# Patient Record
Sex: FEMALE | Race: WHITE | NOT HISPANIC OR LATINO | Employment: OTHER | URBAN - METROPOLITAN AREA
[De-identification: names, ages, dates, MRNs, and addresses within clinical notes are randomized per-mention and may not be internally consistent; named-entity substitution may affect disease eponyms.]

---

## 2020-08-01 ENCOUNTER — COMMUNICATION - HEALTHEAST (OUTPATIENT)
Dept: SCHEDULING | Facility: CLINIC | Age: 80
End: 2020-08-01

## 2020-10-16 ENCOUNTER — COMMUNICATION - HEALTHEAST (OUTPATIENT)
Dept: SCHEDULING | Facility: CLINIC | Age: 80
End: 2020-10-16

## 2021-05-29 ENCOUNTER — RECORDS - HEALTHEAST (OUTPATIENT)
Dept: ADMINISTRATIVE | Facility: CLINIC | Age: 81
End: 2021-05-29

## 2021-09-15 ENCOUNTER — TRANSFERRED RECORDS (OUTPATIENT)
Dept: HEALTH INFORMATION MANAGEMENT | Facility: CLINIC | Age: 81
End: 2021-09-15

## 2021-09-15 ENCOUNTER — HOSPITAL ENCOUNTER (INPATIENT)
Facility: CLINIC | Age: 81
LOS: 1 days | Discharge: HOME | DRG: 392 | End: 2021-09-16
Attending: INTERNAL MEDICINE | Admitting: FAMILY MEDICINE
Payer: COMMERCIAL

## 2021-09-15 DIAGNOSIS — K57.92 DIVERTICULITIS: ICD-10-CM

## 2021-09-15 DIAGNOSIS — K52.9 COLITIS: Primary | ICD-10-CM

## 2021-09-15 DIAGNOSIS — J01.91 ACUTE RECURRENT SINUSITIS, UNSPECIFIED LOCATION: ICD-10-CM

## 2021-09-15 LAB
ALT SERPL-CCNC: 11 IU/L (ref 8–45)
AST SERPL-CCNC: 24 IU/L (ref 2–40)
CEA SERPL-MCNC: 0.8 NG/ML (ref 0–3)
CREATININE (EXTERNAL): 1.04 MG/DL (ref 0.57–1.11)
GFR ESTIMATED (EXTERNAL): 51 ML/MIN/1.73M2
GFR ESTIMATED (IF AFRICAN AMERICAN) (EXTERNAL): >60 ML/MIN/1.73M2
GLUCOSE (EXTERNAL): 123 MG/DL (ref 65–100)
POTASSIUM (EXTERNAL): 4.3 MMOL/L (ref 3.5–5)

## 2021-09-15 PROCEDURE — U0005 INFEC AGEN DETEC AMPLI PROBE: HCPCS | Performed by: FAMILY MEDICINE

## 2021-09-15 PROCEDURE — C9113 INJ PANTOPRAZOLE SODIUM, VIA: HCPCS | Performed by: FAMILY MEDICINE

## 2021-09-15 PROCEDURE — 36415 COLL VENOUS BLD VENIPUNCTURE: CPT | Performed by: INTERNAL MEDICINE

## 2021-09-15 PROCEDURE — 87040 BLOOD CULTURE FOR BACTERIA: CPT | Performed by: INTERNAL MEDICINE

## 2021-09-15 PROCEDURE — 120N000001 HC R&B MED SURG/OB

## 2021-09-15 PROCEDURE — 82378 CARCINOEMBRYONIC ANTIGEN: CPT | Performed by: PHYSICIAN ASSISTANT

## 2021-09-15 PROCEDURE — 87506 IADNA-DNA/RNA PROBE TQ 6-11: CPT | Performed by: INTERNAL MEDICINE

## 2021-09-15 PROCEDURE — 258N000003 HC RX IP 258 OP 636: Performed by: FAMILY MEDICINE

## 2021-09-15 PROCEDURE — 36415 COLL VENOUS BLD VENIPUNCTURE: CPT | Performed by: PHYSICIAN ASSISTANT

## 2021-09-15 PROCEDURE — 87493 C DIFF AMPLIFIED PROBE: CPT | Performed by: INTERNAL MEDICINE

## 2021-09-15 PROCEDURE — 99207 PR NO CHARGE LOS: CPT | Performed by: PHYSICIAN ASSISTANT

## 2021-09-15 PROCEDURE — 99222 1ST HOSP IP/OBS MODERATE 55: CPT | Performed by: SURGERY

## 2021-09-15 PROCEDURE — 99223 1ST HOSP IP/OBS HIGH 75: CPT | Mod: AI | Performed by: FAMILY MEDICINE

## 2021-09-15 PROCEDURE — 250N000013 HC RX MED GY IP 250 OP 250 PS 637: Performed by: FAMILY MEDICINE

## 2021-09-15 PROCEDURE — 250N000011 HC RX IP 250 OP 636: Performed by: FAMILY MEDICINE

## 2021-09-15 RX ORDER — SODIUM CHLORIDE 9 MG/ML
INJECTION, SOLUTION INTRAVENOUS CONTINUOUS
Status: DISCONTINUED | OUTPATIENT
Start: 2021-09-15 | End: 2021-09-16 | Stop reason: HOSPADM

## 2021-09-15 RX ORDER — GABAPENTIN 100 MG/1
100 CAPSULE ORAL
Status: DISCONTINUED | OUTPATIENT
Start: 2021-09-15 | End: 2021-09-16 | Stop reason: HOSPADM

## 2021-09-15 RX ORDER — PIPERACILLIN SODIUM, TAZOBACTAM SODIUM 3; .375 G/15ML; G/15ML
3.38 INJECTION, POWDER, LYOPHILIZED, FOR SOLUTION INTRAVENOUS EVERY 8 HOURS
Status: DISCONTINUED | OUTPATIENT
Start: 2021-09-15 | End: 2021-09-15

## 2021-09-15 RX ORDER — PIPERACILLIN SODIUM, TAZOBACTAM SODIUM 3; .375 G/15ML; G/15ML
3.38 INJECTION, POWDER, LYOPHILIZED, FOR SOLUTION INTRAVENOUS ONCE
Status: COMPLETED | OUTPATIENT
Start: 2021-09-15 | End: 2021-09-15

## 2021-09-15 RX ORDER — POLYETHYLENE GLYCOL 3350 17 G/17G
17 POWDER, FOR SOLUTION ORAL AT BEDTIME
COMMUNITY

## 2021-09-15 RX ORDER — FLUTICASONE PROPIONATE 50 MCG
1 SPRAY, SUSPENSION (ML) NASAL DAILY
Status: DISCONTINUED | OUTPATIENT
Start: 2021-09-15 | End: 2021-09-16 | Stop reason: HOSPADM

## 2021-09-15 RX ORDER — CARBOXYMETHYLCELLULOSE SODIUM 10 MG/ML
1 GEL OPHTHALMIC 3 TIMES DAILY PRN
Status: DISCONTINUED | OUTPATIENT
Start: 2021-09-15 | End: 2021-09-16 | Stop reason: HOSPADM

## 2021-09-15 RX ORDER — ACETAMINOPHEN 325 MG/1
650 TABLET ORAL EVERY 6 HOURS PRN
Status: DISCONTINUED | OUTPATIENT
Start: 2021-09-15 | End: 2021-09-16 | Stop reason: HOSPADM

## 2021-09-15 RX ORDER — CARBOXYMETHYLCELLULOSE SODIUM 5 MG/ML
1 SOLUTION/ DROPS OPHTHALMIC 3 TIMES DAILY PRN
COMMUNITY

## 2021-09-15 RX ORDER — GABAPENTIN 100 MG/1
100 CAPSULE ORAL AT BEDTIME
Status: DISCONTINUED | OUTPATIENT
Start: 2021-09-15 | End: 2021-09-16 | Stop reason: HOSPADM

## 2021-09-15 RX ORDER — LIDOCAINE 40 MG/G
CREAM TOPICAL
Status: DISCONTINUED | OUTPATIENT
Start: 2021-09-15 | End: 2021-09-16 | Stop reason: HOSPADM

## 2021-09-15 RX ORDER — ONDANSETRON 2 MG/ML
4 INJECTION INTRAMUSCULAR; INTRAVENOUS EVERY 6 HOURS PRN
Status: DISCONTINUED | OUTPATIENT
Start: 2021-09-15 | End: 2021-09-16 | Stop reason: HOSPADM

## 2021-09-15 RX ORDER — GABAPENTIN 100 MG/1
100 CAPSULE ORAL
COMMUNITY
End: 2021-09-21

## 2021-09-15 RX ORDER — ACETAMINOPHEN 650 MG/1
650 SUPPOSITORY RECTAL EVERY 6 HOURS PRN
Status: DISCONTINUED | OUTPATIENT
Start: 2021-09-15 | End: 2021-09-16 | Stop reason: HOSPADM

## 2021-09-15 RX ORDER — PIPERACILLIN SODIUM, TAZOBACTAM SODIUM 3; .375 G/15ML; G/15ML
3.38 INJECTION, POWDER, LYOPHILIZED, FOR SOLUTION INTRAVENOUS EVERY 8 HOURS
Status: DISCONTINUED | OUTPATIENT
Start: 2021-09-15 | End: 2021-09-16 | Stop reason: HOSPADM

## 2021-09-15 RX ORDER — CEFTRIAXONE 1 G/1
1 INJECTION, POWDER, FOR SOLUTION INTRAMUSCULAR; INTRAVENOUS EVERY 24 HOURS
Status: DISCONTINUED | OUTPATIENT
Start: 2021-09-16 | End: 2021-09-15

## 2021-09-15 RX ORDER — ONDANSETRON 4 MG/1
4 TABLET, ORALLY DISINTEGRATING ORAL EVERY 6 HOURS PRN
Status: DISCONTINUED | OUTPATIENT
Start: 2021-09-15 | End: 2021-09-16 | Stop reason: HOSPADM

## 2021-09-15 RX ORDER — ALBUTEROL SULFATE 90 UG/1
1-2 AEROSOL, METERED RESPIRATORY (INHALATION) EVERY 4 HOURS PRN
Status: DISCONTINUED | OUTPATIENT
Start: 2021-09-15 | End: 2021-09-16 | Stop reason: HOSPADM

## 2021-09-15 RX ADMIN — PANTOPRAZOLE SODIUM 40 MG: 40 INJECTION, POWDER, FOR SOLUTION INTRAVENOUS at 15:31

## 2021-09-15 RX ADMIN — SODIUM CHLORIDE: 9 INJECTION, SOLUTION INTRAVENOUS at 22:02

## 2021-09-15 RX ADMIN — METRONIDAZOLE 500 MG: 500 INJECTION, SOLUTION INTRAVENOUS at 13:38

## 2021-09-15 RX ADMIN — PIPERACILLIN AND TAZOBACTAM 3.38 G: 3; .375 INJECTION, POWDER, LYOPHILIZED, FOR SOLUTION INTRAVENOUS at 23:48

## 2021-09-15 RX ADMIN — ONDANSETRON 4 MG: 2 INJECTION INTRAMUSCULAR; INTRAVENOUS at 13:37

## 2021-09-15 RX ADMIN — SODIUM CHLORIDE: 9 INJECTION, SOLUTION INTRAVENOUS at 11:53

## 2021-09-15 RX ADMIN — GABAPENTIN 100 MG: 100 CAPSULE ORAL at 20:22

## 2021-09-15 RX ADMIN — PIPERACILLIN AND TAZOBACTAM 3.38 G: 3; .375 INJECTION, POWDER, LYOPHILIZED, FOR SOLUTION INTRAVENOUS at 15:31

## 2021-09-15 ASSESSMENT — ACTIVITIES OF DAILY LIVING (ADL)
TOILETING_ISSUES: NO
DIFFICULTY_COMMUNICATING: NO
FALL_HISTORY_WITHIN_LAST_SIX_MONTHS: NO
EQUIPMENT_CURRENTLY_USED_AT_HOME: CANE, STRAIGHT;WALKER, STANDARD
DRESSING/BATHING_DIFFICULTY: NO
WHICH_OF_THE_ABOVE_FUNCTIONAL_RISKS_HAD_A_RECENT_ONSET_OR_CHANGE?: AMBULATION
DOING_ERRANDS_INDEPENDENTLY_DIFFICULTY: NO
WEAR_GLASSES_OR_BLIND: YES
VISION_MANAGEMENT: GLASSSES
CONCENTRATING,_REMEMBERING_OR_MAKING_DECISIONS_DIFFICULTY: NO
WALKING_OR_CLIMBING_STAIRS: AMBULATION DIFFICULTY, REQUIRES EQUIPMENT
DIFFICULTY_EATING/SWALLOWING: NO
WALKING_OR_CLIMBING_STAIRS_DIFFICULTY: YES
PATIENT_/_FAMILY_COMMUNICATION_STYLE: SPOKEN LANGUAGE (ENGLISH OR BILINGUAL)
HEARING_DIFFICULTY_OR_DEAF: NO

## 2021-09-15 ASSESSMENT — MIFFLIN-ST. JEOR: SCORE: 1265.56

## 2021-09-15 NOTE — H&P
"[facility] MEDICINE ADMISSION HISTORY AND PHYSICAL   Date of Admission: 9/15/2021  Blessing Castellanos, 1940, [id number]    Identification/Summary:   Blessing Castellanos is a 81 year old female with PMH signficant for COPD O2 with activity, RLS, spinal stenosis and Raynaud's.  July 2020 hospitalized for perforated diverticulitis with SBO.  9/15/2021 had similar abdominal pain.  Seen at [facility] and CT showed partial SBO with thickened adjacent segment concerning for possible fistula.  Directly admitted to [facility].  Receiving IV ceftriaxone and Flagyl.  Surgery and GI consulted.      Assessment and Plan:  -Small bowel obstruction  -Colonic inflammatory changes  9/15 [facility] CT scan showed \"moderate segment of wall thickening and hyperenhancement of the sigmoid colon which extends to and involves an adjacent segment of small bowel with findings suggesting partial small bowel obstruction. The appearance is atypical for acute diverticulitis and this may be due to chronic inflammatory bowel disease such as ulcerative colitis with developing fistulous disease with the adjacent small bowel.\"  Given ceftriaxone and Flagyl in the ED.  Directly transferred to [facility].  Keep NPO.  IV fluids ordered.  Ordered consultations with GI and general surgery.  Order IV ceftriaxone and Flagyl.  -COPD  At home patient uses 2 L nasal cannula oxygen with activity.  Order home Dulera, Anoro Ellipta and albuterol as needed.  -Acute on chronic sinusitis  Patient notes right nare green drainage and increased tenderness.  Order home Flonase daily.  Order additional antibiotics per pharmacy recommendations.  -GERD  Transition Prevacid to IV Protonix.  -Restless leg syndrome  -Raynaud's  Order patient's bedtime gabapentin 100 mg nightly and may repeat x1 as needed.  -COVID19 PCR status unknown  Covid swab was not obtained at [facility].  Ordered Covid swab now.  Standard precautions at this time.    Patient " "reports she has completed vaccination series.  -Anticoagulation   Pneumatic Compression Devices and moderate risk.  Uncertain if procedures would be indicated.    Addendum 1:51 PM    Discussed with pharmacy and in an effort to treat potential diverticulitis and sinusitis simultaneously, will discontinue ceftriaxone and Flagyl and switch to IV Zosyn.    Mauro Tang MD  9/15/2021  1:51 PM    -FoleyNot present    Code Status: No CPR- Do NOT Intubate-discussed and verified with patient    Disposition: Inpatient     Chief Complaint  abdominal pain and bloating     HISTORY     Blessing Castellanos is a 81 year old female with PMH of COPD, 2 L of oxygen with activity, SBO hospitalization July 2020, RLS, Raynaud's, GERD, spinal stenosis.  9/15 presented to [facility] with complaints of abdominal pain and bloating.  Felt similar to previous episode of SBO.  CT scan showed \"moderate segment of wall thickening and hyperenhancement of the sigmoid colon which extends to and involves an adjacent segment of small bowel with findings suggesting partial small bowel obstruction. The appearance is atypical for acute diverticulitis and this may be due to chronic inflammatory bowel disease such as ulcerative colitis with developing fistulous disease with the adjacent small bowel.\"  Started on intravenous ceftriaxone and Flagyl and direct admission requested to [facility].  Patient feeling better after arrival.  No chest pain.  Breathing feels normal.  Not requiring supplemental oxygen.  Had not been swab for Covid.  States that she has been vaccinated.  Denies personal history of heart attack, stroke, cancer, diabetes, DVT, PE, peptic ulcer disease.  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  Family is aware that she is here.  Questions answered to verbalize satisfaction.    Past Medical History     Past Medical History:  No date: Bell's palsy  No date: COPD (chronic obstructive pulmonary disease) " (H)  No date: GERD (gastroesophageal reflux disease)  No date: Hypercholesteremia  No date: Nephrolithiasis  No date: Osteoarthritis  No date: PONV (postoperative nausea and vomiting)  No date: Raynaud's disease  No date: Restless leg  No date: Spinal stenosis     Patient Active Problem List    Diagnosis Date Noted     Colitis 09/15/2021     Priority: Medium     SBO (small bowel obstruction) (H) 2020     Priority: Medium     Status post left hip replacement 2015     Priority: Medium     PONV (postoperative nausea and vomiting)      Priority: Medium     Raynaud's disease      Priority: Medium     COPD (chronic obstructive pulmonary disease) (H)      Priority: Medium     Restless leg      Priority: Medium     GERD (gastroesophageal reflux disease)      Priority: Medium     Surgical History     Past Surgical History:   Procedure Laterality Date     APPENDECTOMY       BACK SURGERY       CATARACT EXTRACTION       CHOLECYSTECTOMY       CYSTOCELE REPAIR       HYSTERECTOMY       JOINT REPLACEMENT Bilateral     shoulder     OTHER SURGICAL HISTORY      kidney stones     TONSILLECTOMY       TOTAL HIP ARTHROPLASTY Left 2015    Procedure: LEFT HIP TOTAL ARTHROPLASTY;  Surgeon: Chay Moran MD;  Location: [facility];  Service:      TUBAL LIGATION       VEIN LIGATION AND STRIPPING       Family History      Family History   Problem Relation Age of Onset     Heart Disease Maternal Grandfather       Social History      Social History     Tobacco Use     Smoking status: Former Smoker     Quit date: 1991     Years since quittin.3   Substance Use Topics     Alcohol use: No     Comment: Alcoholic Drinks/day: rare     Drug use: No      Allergies     Allergies   Allergen Reactions     Doxycycline Calcium [Doxycycline] Hives     Mirapex [Pramipexole] Hives     Prilosec [Omeprazole] Hives     Sulfa (Sulfonamide Antibiotics) [Sulfa Drugs] Other (See Comments)     GI Upset     Zithromax [Azithromycin] Unknown      Listed on H&P     Antihistamines - Alkylamine [Alkylamines] Anxiety     Prior to Admission Medications      Prior to Admission Medications   Prescriptions Last Dose Informant Patient Reported? Taking?   acetaminophen (TYLENOL) 500 MG tablet   No No   Sig: [ACETAMINOPHEN (TYLENOL) 500 MG TABLET] Take 1-2 tablets (500-1,000 mg total) by mouth every 4 (four) hours as needed.   albuterol (PROVENTIL HFA;VENTOLIN HFA) 90 mcg/actuation inhaler   Yes No   Sig: [ALBUTEROL (PROVENTIL HFA;VENTOLIN HFA) 90 MCG/ACTUATION INHALER] Inhale 1-2 puffs every 4 (four) hours as needed for wheezing or shortness of breath.   cholecalciferol, vitamin D3, 2,000 unit Tab   Yes No   Sig: [CHOLECALCIFEROL, VITAMIN D3, 2,000 UNIT TAB] Take 2,000 Units by mouth once daily.    fluticasone (FLONASE) 50 mcg/actuation nasal spray   Yes No   Sig: [FLUTICASONE (FLONASE) 50 MCG/ACTUATION NASAL SPRAY] 1 spray into each nostril daily as needed for rhinitis.   gabapentin (NEURONTIN) 100 MG capsule   Yes No   Sig: [GABAPENTIN (NEURONTIN) 100 MG CAPSULE] Take 100 mg by mouth at bedtime.    lansoprazole (PREVACID) 30 MG capsule   Yes No   Sig: [LANSOPRAZOLE (PREVACID) 30 MG CAPSULE] Take 30 mg by mouth daily.   magnesium hydroxide (MAGNESIUM HYDROXIDE) 400 mg/5 mL Susp suspension   Yes No   Sig: [MAGNESIUM HYDROXIDE (MAGNESIUM HYDROXIDE) 400 MG/5 ML SUSP SUSPENSION] Take 30 mL by mouth at bedtime.   mometasone-formoterol (DULERA) 200-5 mcg/actuation HFAA inhaler   Yes No   Sig: [MOMETASONE-FORMOTEROL (DULERA) 200-5 MCG/ACTUATION HFAA INHALER] Inhale 2 puffs once daily.   umeclidinium-vilanteroL (ANORO ELLIPTA) 62.5-25 mcg/actuation inhaler   Yes No   Sig: [UMECLIDINIUM-VILANTEROL (ANORO ELLIPTA) 62.5-25 MCG/ACTUATION INHALER] Inhale 1 puff daily.      Facility-Administered Medications: None      Review of Systems     A 12 point comprehensive review of systems was negative except as noted above in HPI.    PHYSICAL EXAMINATION     Vitals      Temp:  [97.9   F (36.6  C)] 97.9  F (36.6  C)  Pulse:  [87] 87  Resp:  [18] 18  BP: (129)/(63) 129/63  SpO2:  [92 %] 92 %    Examination     Constitutional: awake, alert, cooperative, no apparent distress, and appears stated age  Eyes: Lids and lashes normal, pupils equal, round and reactive to light, extra ocular muscles intact, sclera clear, conjunctiva normal  ENT: Normocephalic, without obvious abnormality, atraumatic, sinuses nontender on palpation, external ears without lesions, oral pharynx with moist mucous membranes, tonsils without erythema or exudates, gums normal and good dentition.  Hematologic / Lymphatic: no cervical lymphadenopathy and no supraclavicular lymphadenopathy  Respiratory: No increased work of breathing, good air exchange, clear to auscultation bilaterally, no crackles or wheezing  Cardiovascular: Normal apical impulse, regular rate and rhythm, normal S1 and S2, no S3 or S4, and no murmur noted  GI: Diminished bowel sounds soft nondistended.  Mild right lower quadrant tenderness to palpation.  Skin: normal skin color, texture, turgor, no redness, warmth, or swelling, and no rashes  Musculoskeletal: There is no redness, warmth, or swelling of the joints.  Full range of motion noted.  Motor strength is 5 out of 5 all extremities bilaterally.  Tone is normal. no lower extremity pitting edema present  Neurologic: Cranial nerves II-XII are grossly intact. Sensory:  Sensory intact Deep Tendon Reflexes:  Reflexes are intact and symmetrical bilaterally  Neuropsychiatric: General: normal, calm and normal eye contact Level of consciousness: alert / normal Affect: normal Orientation: oriented to self, place, time and situation Memory and insight: normal, memory for past and recent events intact and thought process normal      Pertinent Lab   No results found for this or any previous visit (from the past 24 hour(s)).  UA W/ SEDIMENT EXAM REFLEXED PER CRITERIA (09/15/2021 7:43 AM CDT)  UA W/ SEDIMENT EXAM REFLEXED  PER CRITERIA (09/15/2021 7:43 AM CDT)   Component Value Ref Range Performed At Pathologist Signature   COLOR  Yellow Yellow Color [facility]     CLARITY  Clear Clear Clarity [facility]     SPECIFIC GRAVITY,URINE  1.010 1.010, 1.015, 1.020, 1.025  [facility]     PH,URINE  6.5 6.0, 7.0, 8.0, 5.5, 6.5, 7.5, 8.5  [facility]     UROBILINOGEN,QUALITATIVE  Normal Normal EU/dl [facility]     PROTEIN, URINE Negative Negative mg/dL [facility]     GLUCOSE, URINE Negative Negative mg/dL [facility]     KETONES,URINE  Negative Negative mg/dL [facility]     BILIRUBIN,URINE  Negative Negative  [facility]     OCCULT BLOOD,URINE  Negative Negative  [facility]     NITRITE  Negative Negative  [facility]     LEUKOCYTE ESTERASE  Negative Negative  [facility]       UA W/ SEDIMENT EXAM REFLEXED PER CRITERIA (09/15/2021 7:43 AM CDT)   Specimen   Urine - Urine specimen (specimen)     UA W/ SEDIMENT EXAM REFLEXED PER CRITERIA (09/15/2021 7:43 AM CDT)   Performing Organization Address City/State/[place]IP [place] [address]   [phone]     Back to top of Results       CT ABD/PELVIS W IV CONTRAST (09/15/2021 7:26 AM CDT)  CT ABD/PELVIS W IV CONTRAST (09/15/2021 7:26 AM CDT)   Specimen         CT ABD/PELVIS W IV CONTRAST (09/15/2021 7:26 AM CDT)   Matteo   FortMorales MD - 09/15/2021 8:46 AM CDT    This result has an attachment that is not available.    For Patients: As a result of the 21st Century Cures Act, medical imaging exams and procedure reports are released immediately into your electronic medical record. You may view this report before your referring provider. If you have questions, please contact your health care provider.    EXAM: CT ABDOMEN PELVIS W  LOCATION: [facility]  DATE/TIME: 9/15/2021 7:26 AM    INDICATION: Left lower quadrant abdominal pain.  COMPARISON: 9/14/2020.  TECHNIQUE: CT scan of the abdomen  and pelvis was performed following injection of IV contrast. Repeat imaging was obtained due to moderate motion artifact on the initial acquisition. Multiplanar reformats were obtained. Dose reduction techniques were used.  CONTRAST: IOHEXOL 350 MG IODINE/ML  ML BOTTLE: 75mL    FINDINGS:   LOWER CHEST: Negative aside from mild atelectasis or scarring and small hiatal hernia.    HEPATOBILIARY: Normal liver. Prior cholecystectomy.    PANCREAS: Normal.    SPLEEN: Normal.    ADRENAL GLANDS: Normal.    KIDNEYS/BLADDER: No significant mass, stones, or hydronephrosis. There are simple or benign cysts. No follow up is needed. Bladder not well distended.    BOWEL: There is a moderate segment of the sigmoid colon which demonstrates persistent hyperenhancement and with wall thickening and mild adjacent stranding in a similar location to previous imaging. No single inflamed diverticulum is seen as also abnormal enhancement extending from the sigmoid colon which involves the short segment of small bowel in the pelvis posteriorly without abrupt caliber change at this level which is suggestive of partial small bowel obstruction on series 6, image and 105. There are multiple loops of fluid distended small bowel throughout the abdomen with several air-fluid levels measuring 2.9 cm in maximal diameter. The hyperenhancing thickened wall of the sigmoid colon is confluent with mild thickening the left pelvic sidewall. Moderate fluid in the stomach as well as a probable very small hiatal hernia containing mild fluid. There is no free air, loculated fluid collection, or significant ascites.    LYMPH NODES: Normal.    VASCULATURE: Mild atherosclerotic changes without aneurysmal dilatation.    PELVIC ORGANS: No pelvic mass. Inflammatory changes and wall thickening as described above.    MUSCULOSKELETAL: Left hip arthroplasty and long segment posterior metallic fusion of the lumbar spine with interbody fusion with scoliosis and  degenerative changes of the spine, sacroiliac joints, and mild degenerative changes of the right hip.    IMPRESSION:   1.  Abnormal exam with moderate segment of wall thickening and hyperenhancement of the sigmoid colon which extends to and involves an adjacent segment of small bowel with findings suggesting partial small bowel obstruction. The appearance is atypical for acute diverticulitis and this may be due to chronic inflammatory bowel disease such as ulcerative colitis with developing fistulous disease with the adjacent small bowel. Chronic diverticulitis could have a similar appearance although long segment of sigmoid colonic involvement would be atypical and colonoscopy will likely be needed for tissue biopsy and confirm this is benign wall thickening. Findings communicated to the emergency room physician by telephone 9/15/2021 at 8:30 AM.    2.  No abscess, free air, or additional complication.    NOTE: ABNORMAL REPORT    THE DICTATION ABOVE DESCRIBES AN ABNORMALITY FOR WHICH FOLLOW-UP IS NEEDED.        CT ABD/PELVIS W IV CONTRAST (09/15/2021 7:26 AM CDT)   Procedure Note   Morales Moss MD - 09/15/2021    Formatting of this note might be different from the original.  For Patients: As a result of the 21st Century Cures Act, medical imaging exams and procedure reports are released immediately into your electronic medical record. You may view this report before your referring provider. If you have questions, please contact your health care provider.    EXAM: CT ABDOMEN PELVIS W  LOCATION: [facility]  DATE/TIME: 9/15/2021 7:26 AM    INDICATION: Left lower quadrant abdominal pain.  COMPARISON: 9/14/2020.  TECHNIQUE: CT scan of the abdomen and pelvis was performed following injection of IV contrast. Repeat imaging was obtained due to moderate motion artifact on the initial acquisition. Multiplanar reformats were obtained. Dose reduction techniques were used.  CONTRAST: IOHEXOL 350 MG IODINE/ML  ML  BOTTLE: 75mL    FINDINGS:   LOWER CHEST: Negative aside from mild atelectasis or scarring and small hiatal hernia.    HEPATOBILIARY: Normal liver. Prior cholecystectomy.    PANCREAS: Normal.    SPLEEN: Normal.    ADRENAL GLANDS: Normal.    KIDNEYS/BLADDER: No significant mass, stones, or hydronephrosis. There are simple or benign cysts. No follow up is needed. Bladder not well distended.    BOWEL: There is a moderate segment of the sigmoid colon which demonstrates persistent hyperenhancement and with wall thickening and mild adjacent stranding in a similar location to previous imaging. No single inflamed diverticulum is seen as also abnormal enhancement extending from the sigmoid colon which involves the short segment of small bowel in the pelvis posteriorly without abrupt caliber change at this level which is suggestive of partial small bowel obstruction on series 6, image and 105. There are multiple loops of fluid distended small bowel throughout the abdomen with several air-fluid levels measuring 2.9 cm in maximal diameter. The hyperenhancing thickened wall of the sigmoid colon is confluent with mild thickening the left pelvic sidewall. Moderate fluid in the stomach as well as a probable very small hiatal hernia containing mild fluid. There is no free air, loculated fluid collection, or significant ascites.    LYMPH NODES: Normal.    VASCULATURE: Mild atherosclerotic changes without aneurysmal dilatation.    PELVIC ORGANS: No pelvic mass. Inflammatory changes and wall thickening as described above.    MUSCULOSKELETAL: Left hip arthroplasty and long segment posterior metallic fusion of the lumbar spine with interbody fusion with scoliosis and degenerative changes of the spine, sacroiliac joints, and mild degenerative changes of the right hip.    IMPRESSION:   1. Abnormal exam with moderate segment of wall thickening and hyperenhancement of the sigmoid colon which extends to and involves an adjacent segment of  small bowel with findings suggesting partial small bowel obstruction. The appearance is atypical for acute diverticulitis and this may be due to chronic inflammatory bowel disease such as ulcerative colitis with developing fistulous disease with the adjacent small bowel. Chronic diverticulitis could have a similar appearance although long segment of sigmoid colonic involvement would be atypical and colonoscopy will likely be needed for tissue biopsy and confirm this is benign wall thickening. Findings communicated to the emergency room physician by telephone 9/15/2021 at 8:30 AM.    2. No abscess, free air, or additional complication.    NOTE: ABNORMAL REPORT    THE DICTATION ABOVE DESCRIBES AN ABNORMALITY FOR WHICH FOLLOW-UP IS NEEDED.      Back to top of Results       CBC WITH AUTO DIFFERENTIAL (09/15/2021 6:47 AM CDT)  CBC WITH AUTO DIFFERENTIAL (09/15/2021 6:47 AM CDT)   Component Value Ref Range Performed At Pathologist Signature   WHITE BLOOD COUNT  18.9 (H) 4.5 - 11.0 thou/cu mm [facility]     RED BLOOD COUNT  5.50 (H) 4.00 - 5.20 mil/cu mm [facility]     HEMOGLOBIN  16.1 (H) 12.0 - 16.0 g/dL [facility]     HEMATOCRIT  49.3 33.0 - 51.0 % [facility]     MCV  90 80 - 100 fL [facility]     MCH  29.3 26.0 - 34.0 pg [facility]     MCHC  32.7 32.0 - 36.0 g/dL [facility]     RDW  13.9 11.5 - 15.5 % [facility]     PLATELET COUNT  421 140 - 440 thou/cu mm [facility]     MPV  9.3 6.5 - 11.0 fL [facility]     NEUTROPHILS  86.7 % [facility]     LYMPHOCYTES  6.8 % [facility]     MONOCYTES  5.6 % [facility]     EOSINOPHILS  0.3 % [facility]     BASOPHILS  0.2 % [facility]     IMMATURE GRANULOCYTES(METAS,MYELOS,PROS) 0.4 % [facility]     ABSOLUTE NEUTROPHILS  16.4 (H) 1.7 - 7.0 thou/cu mm [facility]     ABSOLUTE LYMPHOCYTES  1.3 0.9 - 2.9 thou/cu mm [facility]     ABSOLUTE MONOCYTES  1.1 (H) <0.9 thou/cu mm [facility]     ABSOLUTE  EOSINOPHILS  0.1 <0.5 thou/cu [facility]     ABSOLUTE BASOPHILS  0.0 <0.3 thou/cu [facility]     ABSOLUTE IMMATURE GRANULOCYTES(METAS,MYELOS,PROS) 0.1 <0.3 thou/cu [facility]       CBC WITH AUTO DIFFERENTIAL (09/15/2021 6:47 AM CDT)   Specimen   Blood - Blood specimen (specimen)     CBC WITH AUTO DIFFERENTIAL (09/15/2021 6:47 AM CDT)   Performing Organization Address City/State/ZIP Code Phone Number   [facility]   [address] [id number] [phone]     Back to top of Results       COMP METABOLIC PANEL (09/15/2021 6:47 AM CDT)  COMP METABOLIC PANEL (09/15/2021 6:47 AM CDT)   Component Value Ref Range Performed At Pathologist Signature   SODIUM 138 135 - 145 mmol/L [facility]     POTASSIUM 4.3 3.5 - 5.0 mmol/L [facility]     CHLORIDE 100 98 - 110 mmol/L [facility]     CO2,TOTAL 24 21 - 31 mmol/L [facility]     ANION GAP 14 5 - 18  [facility]     GLUCOSE 123 (H) 65 - 100 mg/dL [facility]     CALCIUM 10.6 (H) 8.5 - 10.5 mg/dL [facility]     BUN 20 8 - 25 mg/dL [facility]     CREATININE 1.04 0.57 - 1.11 mg/dL [facility]     BUN/CREAT RATIO  19 10 - 20  [facility]     GFR if African American >60 >60 ml/min/1.73m2 [facility]     GFR if not African American 51 (L) >60 ml/min/1.73m2 [facility]     ALBUMIN 4.3 3.2 - 4.6 g/dL [facility]     PROTEIN,TOTAL 8.5 (H) 6.0 - 8.0 g/dL [facility]     GLOBULIN  4.2 (H) 2.0 - 3.7 g/dL [facility]     A/G RATIO 1.0 1.0 - 2.0  [facility]     BILIRUBIN,TOTAL 1.0 0.2 - 1.2 mg/dL [facility]     ALK PHOSPHATASE 90 50 - 136 IU/L [facility]     ALT (SGPT) 11 8 - 45 IU/L [facility]     AST (SGOT) 24 2 - 40 IU/L [facility]       COMP METABOLIC PANEL (09/15/2021 6:47 AM CDT)   Specimen   Blood - Blood specimen (specimen)     COMP METABOLIC PANEL (09/15/2021 6:47 AM CDT)   Performing Organization Address City/State/ZIP Code Phone Number   [place]  [place] [id number]   [phone]     Back to top of Results       COVID 19 (06/22/2021 10:33 AM CDT)  COVID 19 (06/22/2021 10:33 AM CDT)   Component Value Ref Range Performed At Pathologist Signature   [place] Negative[place]: All PCR tests are subject to false negative result due to variability in viral load and collection technique. A negative result does not rule out a SARS-CoV-2 infection. Clinical correlation required. Negative [facility]       COVID 19 (06/22/2021 10:33 AM CDT)   Specimen   Other - Specimen from nasopharyngeal structure (specimen)               Advance Care Planning        Mauro Tang MD  [facility]   Phone: #[phone]

## 2021-09-15 NOTE — CONSULTS
GI CONSULT NOTE      Name: Blessing Castellanos  Medical Record #: [id number]  YOB: 1940  Date of Admission: 9/15/2021  Date/Time: 9/15/2021/12:48 PM     CHIEF COMPLAINT: abdomen pain and vomiting     HISTORY OF PRESENT ILLNESS: This is a 81 year old year old White patient seen at the request of Dr. Tang with a history of COPD, RLS, spinal stenosis, Raynaud's, prior cholecystectomy and appendectomy.  She had acute diverticulitis on CT 6/2020, was hospitalized 7/2020 and CT showed colon thickening with possible fistula and SBO.  She was treated with antibiotics and NG tube with improved symptoms.  After discharge she was to have a follow up colonoscopy 10/2020 but this was never done.  The patient said she was busy taking care of her  with cancer, and she deferred colonoscopy as she felt better.  She reports her last colonoscopy was around 8-10 years ago and she did have polyps in the past.    She felt well until about 2 days ago.  She had mild cramps initially, but yesterday the pain worsened and she had several episodes of emesis and diarrhea overnight.  She went to the ER in [place] and was transferred to [facility].  Imaging showed colon thickening and small bowel dilation consistent with pSBO.  Her pain is improved but still with intermittent cramps.  Her nausea improved with antiemetics.    She reports a history of chronic constipation which is typically well controlled with daily MiraLax, usually having a BM once daily.  In the past week she had mild constipation skipping a day or 2 without BMs.  Yesterday she had 2 small BMs before the diarrhea started.  She has not had bloody stools.    There is no family history of GI cancers or IBD.  Her daughter has had diverticulitis.  The patient has lost about 10 pounds recently without trying.  She gets full easily.    PAST MEDICAL HISTORY:  Past Medical History:   Diagnosis Date     Bell's palsy      COPD (chronic obstructive pulmonary  disease) (H)      GERD (gastroesophageal reflux disease)      Hypercholesteremia      Nephrolithiasis      Osteoarthritis      PONV (postoperative nausea and vomiting)      Raynaud's disease      Restless leg      Spinal stenosis        FAMILY HISTORY:  Family History   Problem Relation Age of Onset     Heart Disease Maternal Grandfather        SOCIAL HISTORY:  Social History     Socioeconomic History     Marital status:      Spouse name: Not on file     Number of children: Not on file     Years of education: Not on file     Highest education level: Not on file   Occupational History     Not on file   Tobacco Use     Smoking status: Former Smoker     Quit date: 1991     Years since quittin.3   Substance and Sexual Activity     Alcohol use: No     Comment: Alcoholic Drinks/day: rare     Drug use: No     Sexual activity: Not on file   Other Topics Concern     Not on file   Social History Narrative     Not on file     Social Determinants of Health     Financial Resource Strain:      Difficulty of Paying Living Expenses:    Food Insecurity:      Worried About Running Out of Food in the Last Year:      Ran Out of Food in the Last Year:    Transportation Needs:      Lack of Transportation (Medical):      Lack of Transportation (Non-Medical):    Physical Activity:      Days of Exercise per Week:      Minutes of Exercise per Session:    Stress:      Feeling of Stress :    Social Connections:      Frequency of Communication with Friends and Family:      Frequency of Social Gatherings with Friends and Family:      Attends Yazidi Services:      Active Member of Clubs or Organizations:      Attends Club or Organization Meetings:      Marital Status:    Intimate Partner Violence:      Fear of Current or Ex-Partner:      Emotionally Abused:      Physically Abused:      Sexually Abused:        MEDICATIONS PRIOR TO ADMISSION:   Medications Prior to Admission   Medication Sig Dispense Refill Last Dose      albuterol (PROVENTIL HFA;VENTOLIN HFA) 90 mcg/actuation inhaler [ALBUTEROL (PROVENTIL HFA;VENTOLIN HFA) 90 MCG/ACTUATION INHALER] Inhale 1-2 puffs every 4 (four) hours as needed for wheezing or shortness of breath.   9/14/2021 at can bring     carboxymethylcellulose PF (REFRESH LIQUIGEL) 1 % ophthalmic gel Place 1 drop into both eyes 3 times daily as needed for dry eyes   PRN     cholecalciferol, vitamin D3, 2,000 unit Tab [CHOLECALCIFEROL, VITAMIN D3, 2,000 UNIT TAB] Take 2,000 Units by mouth once daily.    9/14/2021 at Unknown time     fluticasone (FLONASE) 50 mcg/actuation nasal spray [FLUTICASONE (FLONASE) 50 MCG/ACTUATION NASAL SPRAY] 1 spray into each nostril daily as needed for rhinitis.   Past Week at can bring if needed     gabapentin (NEURONTIN) 100 MG capsule Take 100 mg by mouth at bedtime as needed, may repeat once (additional 100 mg dose if needed)   PRN     gabapentin (NEURONTIN) 100 MG capsule [GABAPENTIN (NEURONTIN) 100 MG CAPSULE] Take 100 mg by mouth at bedtime.    9/14/2021 at Unknown time     lansoprazole (PREVACID) 30 MG capsule Take 30 mg by mouth At Bedtime    9/14/2021 at Unknown time     mometasone-formoterol (DULERA) 200-5 mcg/actuation HFAA inhaler [MOMETASONE-FORMOTEROL (DULERA) 200-5 MCG/ACTUATION HFAA INHALER] Inhale 2 puffs once daily.   9/14/2021 at am     polyethylene glycol (MIRALAX) 17 g packet Take 17 g by mouth At Bedtime   9/14/2021 at Unknown time     umeclidinium-vilanteroL (ANORO ELLIPTA) 62.5-25 mcg/actuation inhaler [UMECLIDINIUM-VILANTEROL (ANORO ELLIPTA) 62.5-25 MCG/ACTUATION INHALER] Inhale 1 puff daily.   9/14/2021 at am          ALLERGIES: Doxycycline calcium [doxycycline], Mirapex [pramipexole], Prilosec [omeprazole], Sulfa (sulfonamide antibiotics) [sulfa drugs], Zithromax [azithromycin], and Antihistamines - alkylamine [alkylamines]    REVIEW OF SYSTEMS (ROS): Complete review of systems negative other than listed in HPI.    PHYSICAL EXAM:    /63 (BP Location:  "Left arm)   Pulse 87   Temp 97.9  F (36.6  C) (Oral)   Resp 18   Ht 1.727 m (5' 8\")   Wt 75.2 kg (165 lb 12.8 oz)   SpO2 92%   BMI 25.21 kg/m      GENERAL: Pleasant, no obvious distress    SKIN: No jaundice    NECK: Supple without adenopathy    EYES: No scleral icterus    LUNGS: Clear to auscultation bilaterally    HEART: Regular rate and rhythm, S1 and S2 present, no lower extremity edema    ABDOMEN: Soft, non-distended, mildly tender diffuse right abd upper abdomen, no guarding/rebound/mass, bowel sounds hyperactive, no obvious organomegaly    MUSKULOSKELETAL:  Warm and well perfused, moves all extremities well    NEUROLOGIC: Alert and oriented    PSYCHIATRIC: Normal affect    LAB DATA:  Recent Labs   Lab Test 08/02/20 0522 08/01/20  0559 07/31/20  0551   WBC 14.2* 13.2* 17.9*   HGB 12.3 12.7 14.0   MCV 89 91 90    358 458*     Recent Labs   Lab Test 08/02/20 0522 08/01/20  0559 07/31/20  0551    140 139   POTASSIUM 3.6 3.9 4.8   CHLORIDE 107 109* 104   CO2 24 23 27   BUN 12 16 18   CR 0.68 0.73 1.13*   ANIONGAP 8 8 8   HANG 7.9* 8.1* 8.6   GLC 98 84 110     Recent Labs   Lab Test 07/30/20  1602   ALBUMIN 3.8   BILITOTAL 0.5   ALT 11   AST 19   ALKPHOS 99   LIPASE 10       IMAGING:  EXAM: CT ABDOMEN PELVIS W   LOCATION: [facility]   DATE/TIME: 9/15/2021 7:26 AM     INDICATION: Left lower quadrant abdominal pain.   COMPARISON: 9/14/2020.   TECHNIQUE: CT scan of the abdomen and pelvis was performed following injection of IV contrast. Repeat imaging was obtained due to moderate motion artifact on the initial acquisition. Multiplanar reformats were obtained. Dose reduction techniques were used.   CONTRAST: IOHEXOL 350 MG IODINE/ML  ML BOTTLE: 75mL     FINDINGS:   LOWER CHEST: Negative aside from mild atelectasis or scarring and small hiatal hernia.     HEPATOBILIARY: Normal liver. Prior cholecystectomy.     PANCREAS: Normal.     SPLEEN: Normal.     ADRENAL GLANDS: Normal. "     KIDNEYS/BLADDER: No significant mass, stones, or hydronephrosis. There are simple or benign cysts. No follow up is needed. Bladder not well distended.     BOWEL: There is a moderate segment of the sigmoid colon which demonstrates persistent hyperenhancement and with wall thickening and mild adjacent stranding in a similar location to previous imaging. No single inflamed diverticulum is seen as also abnormal enhancement extending from the sigmoid colon which involves the short segment of small bowel in the pelvis posteriorly without abrupt caliber change at this level which is suggestive of partial small bowel obstruction on series 6, image and 105. There are multiple loops of fluid distended small bowel throughout the abdomen with several air-fluid levels measuring 2.9 cm in maximal diameter. The hyperenhancing thickened wall of the sigmoid colon is confluent with mild thickening the left pelvic sidewall. Moderate fluid in the stomach as well as a probable very small hiatal hernia containing mild fluid. There is no free air, loculated fluid collection, or significant ascites.     LYMPH NODES: Normal.     VASCULATURE: Mild atherosclerotic changes without aneurysmal dilatation.     PELVIC ORGANS: No pelvic mass. Inflammatory changes and wall thickening as described above.     MUSCULOSKELETAL: Left hip arthroplasty and long segment posterior metallic fusion of the lumbar spine with interbody fusion with scoliosis and degenerative changes of the spine, sacroiliac joints, and mild degenerative changes of the right hip.     IMPRESSION:   1.  Abnormal exam with moderate segment of wall thickening and hyperenhancement of the sigmoid colon which extends to and involves an adjacent segment of small bowel with findings suggesting partial small bowel obstruction. The appearance is atypical for acute diverticulitis and this may be due to chronic inflammatory bowel disease such as ulcerative colitis with developing fistulous  disease with the adjacent small bowel. Chronic diverticulitis could have a similar appearance although long segment of sigmoid colonic involvement would be atypical and colonoscopy will likely be needed for tissue biopsy and confirm this is benign wall thickening. Findings communicated to the emergency room physician by telephone 9/15/2021 at 8:30 AM.     2.  No abscess, free air, or additional complication.     9/14/2020 CT abdomen/pelvis with IV contrast [facility]  FINDINGS:   LOWER CHEST: Strands of scarring both lungs. Calcified mitral annulus and aortic   valve leaflets. Calcified right coronary artery atherosclerosis.     HEPATOBILIARY: Cholecystectomy.     PANCREAS: Normal.     SPLEEN: Normal.     ADRENAL GLANDS: Normal.     KIDNEYS/BLADDER: Tiny right kidney cyst, requiring no follow-up.     BOWEL: Sigmoid diverticulosis. 12 cm segment of sigmoid colon demonstrates   circumferential wall thickening, mild pericolonic inflammatory change, and   engorgement of the vasa recta. No obstruction, abscess, or fistula.   Appendectomy.     LYMPH NODES: Normal.     VASCULATURE: Marked atherosclerosis.     PELVIC ORGANS: Hysterectomy. Pelvic phleboliths.     MUSCULOSKELETAL: L4-L5 decompressive laminectomies. Posterior and interbody   fusion L2-S1. Moderate degenerative change in the spine.     IMPRESSION:   Uncomplicated acute sigmoid diverticulitis in the same location as on   06/17/2020, though slightly less severe currently.      7/31/2020 CT abdomen/pelvis with IV contrast [facility]  IMPRESSION:  New moderate fluid-filled distention of small bowel extending down to the pelvis where there is irregular wall thickening in a small bowel loop resulting from fistulization from the adjacent sigmoid colon. Findings compatible with mechanical   small bowel obstruction. Long segment, somewhat irregular, wall thickening in the sigmoid again seen with surrounding soft tissue stranding. There are a few diverticula present, and  diagnostic considerations include sigmoid diverticulitis versus a   Carcinoma.    6/17/2020 CT abdomen/pelvis with IV contrast [facility]  FINDINGS:   LOWER CHEST: Normal.     HEPATOBILIARY: Liver within normal limits. Cholecystectomy.     PANCREAS: Normal.     SPLEEN: Normal.     ADRENAL GLANDS: Normal.     KIDNEYS/BLADDER: Normal.     BOWEL: Bowel wall thickening and inflammatory change of the mid sigmoid colon   compatible with acute diverticulitis. No abscess or perforation. Remaining bowel   within normal limits.     LYMPH NODES: Normal.     VASCULATURE: Mild atherosclerotic disease of the abdominal aorta and its   branches.     PELVIC ORGANS: Bladder decompressed.     MUSCULOSKELETAL: Surgical fixation of left hip. Status post surgical fixation of   lower lumbar spine. Degenerative changes of the spine.     IMPRESSION:   1.  Acute diverticulitis of the mid sigmoid colon. No evidence of perforation or   abscess.      ASSESSMENT:  Abdominal pain with nausea/vomiting and diarrhea, acute symptoms but with similar episode last summer.  CT shows evidence for sigmoid thickening which has been chronic and concerning for chronic diverticulitis with possible fistula formation.  She felt better after antibiotic treatment last summer, and IBD seems less likely.  Could also consider colonic malignancy, and she has not had a recent colonoscopy.  The surgery team has also consulted.  She will be treated again with antibiotics.  Surgical resection may be considered given the persistent sigmoid thickening.  We will check CEA as well.    Active Problems:    Colitis    PLAN:  Discussed with Dr. Roger Mclaughlin.    1.  Continue antibiotics.  2.  Check CEA.  3.  Appreciate surgery consultation.  4.  If she again improves with antibiotics and does not have resection, we could plan elective colonoscopy in about 4-6 weeks.  5.  GI following.    Adriane Vincent PA-C  [facility]  [phone]      GI staff addendum  DOS  "9/15/2021    80 yo F with hx of diverticulitis, small bowel fistula, treated in 2020. Never underwent f/u colonoscopy (last colonoscopy >10 yr ago; report NOT available.) Presents with 2-3 days worsening abdominal pain, nausea, emesis. Pain in lower abdomen. Associated diaphoresis, chills. Emesis of dark bilious fluid. No hematemesis. Loose stool; dark bilious. No hematochezia or melena. Pt reports ~10 lb wt loss. Denies regular NSAIDs. No sick contacts.   Pt had 2 additional episodes of emesis this afternoon; just prior to evaluation.    /63 (BP Location: Left arm)   Pulse 87   Temp 97.9  F (36.6  C) (Oral)   Resp 18   Ht 1.727 m (5' 8\")   Wt 75.2 kg (165 lb 12.8 oz)   SpO2 92%   BMI 25.21 kg/m    General: A&O, NAD, non-toxic appearing  Eyes: No icterus or conjunctivitis  ENT: MMM, OP clear without ulcerations  Neck/Thyroid: Supple, no masses  Pulmonary: CTA B anteriorly  Cardiovascular: RR, S1, S2  Gastrointestinal: Soft, non-distended, mild TTP in LLQ, no r/g, no masses  Skin: No jaundice/petechiae  Lymph nodes: No cervical or supraclavicular lymphadenopathy  Extrem: PPI, no c/c/e    Labs/Imaging  Leukocytosis    CT (9/2021)  1.  Abnormal exam with moderate segment of wall thickening and hyperenhancement of the sigmoid colon which extends to and involves an adjacent segment of small bowel with findings suggesting partial small bowel obstruction. The appearance is atypical for acute diverticulitis and this may be due to chronic inflammatory bowel disease such as ulcerative colitis with developing fistulous disease with the adjacent small bowel. Chronic diverticulitis could have a similar appearance although long segment of sigmoid colonic involvement would be atypical and colonoscopy will likely be needed for tissue biopsy and confirm this is benign wall thickening.  2.  No abscess, free air, or additional complication.       A/P:  1. Segmental colitis - Concern for chronic diverticulitis as noted on " past CT. 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 Diarrhea, nausea, emesis. Leukocytosis. IBD possible, however seems much less likely. Malignancy on differential.  2. Partial small bowel obstruction - Related to inflammatory process.    -NPO  -IVF  -Anti-emetic therapy.  -Place NG if nausea/emesis continue.  -Obtain stool and blood cultures.  -Continue IV antibx  -Check CEA.  -Appreciate surgery input.  -Ideally recommend colonoscopy in ~4-6 weeks once inflammation improved/pSBO resolves, however if symptoms persist surgery may be needed.    Roger Mclaughlin MD on 9/15/2021 at 3:14 PM

## 2021-09-15 NOTE — CONSULTS
General Surgery Consultation  Blessing Castellanos MRN# [id number]   Age/Sex: 81 year old female YOB: 1940     Reason for consult: No diagnosis found.        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  -N.p.o. strict okay occasional ice chips  -If any increase in nausea or bloating then I would recommend NG tube for decompression  -IV antibiotics  -No surgical indication at this time however we will follow the patient closely.  -Thank you for consulting us involving us in her care.            Chief Complaint:   No chief complaint on file.       History is obtained from the patient    HPI:   Blessing Castellanos is a 81 year old female history of COPD oxygen dependent, RLS, spinal stenosis, Raynaud's, s/p open cholecystectomy and appendectomy, and s/p abdominal exposure to spine for spinal surgery who presents with acute abdominal pain.  Patient transferred from [facility].  Patient was seen by us approximately a year ago for similar problem and treated for diverticulitis with possible fistula to the small bowel with small bowel obstruction.  Patient treated successfully with IV antibiotics and NG decompression and was discharged after 4 days.  Since then patient has not followed up as she has been caring for her  who has cancer.  About a day and a half ago developed acute abdominal pain described as cramping across her whole abdomen.  She had associated nausea and vomiting and vomited all night.  Had a bowel movement last night and then again this morning.  At first had some diarrhea but now the stool was starting to firm up.  Denies any melena or hematochezia.  She denies any chest pain, shortness of breath, pain with activity, change in bowel  habits, weight loss, fever or chills.  Upon evaluation CT findings again shows thickening of the small bowel and colon with possible fistula, chronic diverticulitis versus colitis .  White count elevated 18.9.  Hemoglobin 16.1.  Glucose elevated at 123, calcium of 10.6 slightly elevated, GFR 51 with a creatinine of 1.04.  No other abnormalities.  Has not had a colonoscopy in quite some time and states that she does do guaiac testing through her primary.          Past Medical History:     Past Medical History:   Diagnosis Date     Bell's palsy      COPD (chronic obstructive pulmonary disease) (H)      GERD (gastroesophageal reflux disease)      Hypercholesteremia      Nephrolithiasis      Osteoarthritis      PONV (postoperative nausea and vomiting)      Raynaud's disease      Restless leg      Spinal stenosis               Past Surgical History:     Past Surgical History:   Procedure Laterality Date     APPENDECTOMY       BACK SURGERY       CATARACT EXTRACTION       CHOLECYSTECTOMY       CYSTOCELE REPAIR       HYSTERECTOMY       JOINT REPLACEMENT Bilateral     shoulder     OTHER SURGICAL HISTORY      kidney stones     TONSILLECTOMY       TOTAL HIP ARTHROPLASTY Left 5/28/2015    Procedure: LEFT HIP TOTAL ARTHROPLASTY;  Surgeon: Chay Moran MD;  Location: [facility];  Service:      TUBAL LIGATION       VEIN LIGATION AND STRIPPING               Social History:    reports that she quit smoking about 30 years ago. She does not have any smokeless tobacco history on file. She reports that she does not drink alcohol and does not use drugs.           Family History:     Family History   Problem Relation Age of Onset     Heart Disease Maternal Grandfather               Allergies:     Allergies   Allergen Reactions     Doxycycline Calcium [Doxycycline] Hives     Mirapex [Pramipexole] Hives     Prilosec [Omeprazole] Hives     Sulfa (Sulfonamide Antibiotics) [Sulfa Drugs] Other (See Comments)     GI Upset     Zithromax  [Azithromycin] Unknown     Listed on H&P     Antihistamines - Alkylamine [Alkylamines] Anxiety              Medications:     Prior to Admission medications    Medication Sig Start Date End Date Taking? Authorizing Provider   albuterol (PROVENTIL HFA;VENTOLIN HFA) 90 mcg/actuation inhaler [ALBUTEROL (PROVENTIL HFA;VENTOLIN HFA) 90 MCG/ACTUATION INHALER] Inhale 1-2 puffs every 4 (four) hours as needed for wheezing or shortness of breath. 5/28/15  Yes Provider, Historical   carboxymethylcellulose PF (REFRESH LIQUIGEL) 1 % ophthalmic gel Place 1 drop into both eyes 3 times daily as needed for dry eyes   Yes Unknown, Entered By History   cholecalciferol, vitamin D3, 2,000 unit Tab [CHOLECALCIFEROL, VITAMIN D3, 2,000 UNIT TAB] Take 2,000 Units by mouth once daily.  5/22/15  Yes Provider, Historical   fluticasone (FLONASE) 50 mcg/actuation nasal spray [FLUTICASONE (FLONASE) 50 MCG/ACTUATION NASAL SPRAY] 1 spray into each nostril daily as needed for rhinitis. 5/28/15  Yes Provider, Historical   gabapentin (NEURONTIN) 100 MG capsule Take 100 mg by mouth nightly as needed (additional 100 mg dose if needed)    Yes Unknown, Entered By History   gabapentin (NEURONTIN) 100 MG capsule [GABAPENTIN (NEURONTIN) 100 MG CAPSULE] Take 100 mg by mouth at bedtime.  5/22/15  Yes Provider, Historical   lansoprazole (PREVACID) 30 MG capsule Take 30 mg by mouth At Bedtime  5/28/15  Yes Provider, Historical   mometasone-formoterol (DULERA) 200-5 mcg/actuation HFAA inhaler [MOMETASONE-FORMOTEROL (DULERA) 200-5 MCG/ACTUATION HFAA INHALER] Inhale 2 puffs once daily. 5/28/15  Yes Provider, Historical   polyethylene glycol (MIRALAX) 17 g packet Take 17 g by mouth At Bedtime   Yes Unknown, Entered By History   umeclidinium-vilanteroL (ANORO ELLIPTA) 62.5-25 mcg/actuation inhaler [UMECLIDINIUM-VILANTEROL (ANORO ELLIPTA) 62.5-25 MCG/ACTUATION INHALER] Inhale 1 puff daily. 7/30/20  Yes Provider, Historical              Review of Systems:   The Review  "of Systems is negative other than noted in the HPI            Physical Exam:     Patient Vitals for the past 24 hrs:   BP Temp Temp src Pulse Resp SpO2 Height Weight   09/15/21 1036 129/63 97.9  F (36.6  C) Oral 87 18 92 % 1.727 m (5' 8\") 75.2 kg (165 lb 12.8 oz)        No intake or output data in the 24 hours ending 09/15/21 1300   Constitutional:   awake, alert, cooperative, no apparent distress, and appears stated age       Eyes:   PERRL, conjunctiva/corneas clear, EOM's intact; no scleral edema or icterus noted        ENT:   Normocephalic, without obvious abnormality, atraumatic, Lips, mucosa, and tongue normal        Hematologic / Lymphatic:   No general lymphadenopathy       Lungs:   Normal respiratory effort, no accessory muscle use       Cardiovascular:   Regular rate and rhythm       Abdomen:   Soft generalized tenderness all 4 quadrants with no particular areas worse than the other.  No rebound or peritoneal signs present.  Large incision midline and also large right upper quadrant incision from previous cholecystectomy open       Musculoskeletal:   No obvious swelling, bruising or deformity       Skin:   Skin color and texture normal for patient, no rashes or lesions              Data:        Results reviewed however imaging not available yet.  Morales Moss MD - 09/15/2021 8:46 AM CDT    This result has an attachment that is not available.    For Patients: As a result of the 21st Century Cures Act, medical imaging exams and procedure reports are released immediately into your electronic medical record. You may view this report before your referring provider. If you have questions, please contact your health care provider.    EXAM: CT ABDOMEN PELVIS W  LOCATION: [facility]  DATE/TIME: 9/15/2021 7:26 AM    INDICATION: Left lower quadrant abdominal pain.  COMPARISON: 9/14/2020.  TECHNIQUE: CT scan of the abdomen and pelvis was performed following injection of IV contrast. Repeat imaging was obtained " due to moderate motion artifact on the initial acquisition. Multiplanar reformats were obtained. Dose reduction techniques were used.  CONTRAST: IOHEXOL 350 MG IODINE/ML  ML BOTTLE: 75mL    FINDINGS:   LOWER CHEST: Negative aside from mild atelectasis or scarring and small hiatal hernia.    HEPATOBILIARY: Normal liver. Prior cholecystectomy.    PANCREAS: Normal.    SPLEEN: Normal.    ADRENAL GLANDS: Normal.    KIDNEYS/BLADDER: No significant mass, stones, or hydronephrosis. There are simple or benign cysts. No follow up is needed. Bladder not well distended.    BOWEL: There is a moderate segment of the sigmoid colon which demonstrates persistent hyperenhancement and with wall thickening and mild adjacent stranding in a similar location to previous imaging. No single inflamed diverticulum is seen as also abnormal enhancement extending from the sigmoid colon which involves the short segment of small bowel in the pelvis posteriorly without abrupt caliber change at this level which is suggestive of partial small bowel obstruction on series 6, image and 105. There are multiple loops of fluid distended small bowel throughout the abdomen with several air-fluid levels measuring 2.9 cm in maximal diameter. The hyperenhancing thickened wall of the sigmoid colon is confluent with mild thickening the left pelvic sidewall. Moderate fluid in the stomach as well as a probable very small hiatal hernia containing mild fluid. There is no free air, loculated fluid collection, or significant ascites.    LYMPH NODES: Normal.    VASCULATURE: Mild atherosclerotic changes without aneurysmal dilatation.    PELVIC ORGANS: No pelvic mass. Inflammatory changes and wall thickening as described above.    MUSCULOSKELETAL: Left hip arthroplasty and long segment posterior metallic fusion of the lumbar spine with interbody fusion with scoliosis and degenerative changes of the spine, sacroiliac joints, and mild degenerative changes of the right  hip.    IMPRESSION:   1.  Abnormal exam with moderate segment of wall thickening and hyperenhancement of the sigmoid colon which extends to and involves an adjacent segment of small bowel with findings suggesting partial small bowel obstruction. The appearance is atypical for acute diverticulitis and this may be due to chronic inflammatory bowel disease such as ulcerative colitis with developing fistulous disease with the adjacent small bowel. Chronic diverticulitis could have a similar appearance although long segment of sigmoid colonic involvement would be atypical and colonoscopy will likely be needed for tissue biopsy and confirm this is benign wall thickening. Findings communicated to the emergency room physician by telephone 9/15/2021 at 8:30 AM.    2.  No abscess, free air, or additional complication.    NOTE: ABNORMAL REPORT    THE DICTATION ABOVE DESCRIBES AN ABNORMALITY FOR WHICH FOLLOW-UP IS NEEDED.     No results found for this or any previous visit (from the past 24 hour(s)).   CBC WITH AUTO DIFFERENTIAL (09/15/2021 6:47 AM CDT)         CBC WITH AUTO DIFFERENTIAL (09/15/2021 6:47 AM CDT)   Component Value Ref Range Performed At Pathologist Signature   WHITE BLOOD COUNT  18.9 (H) 4.5 - 11.0 thou/cu mm [facility]     RED BLOOD COUNT  5.50 (H) 4.00 - 5.20 mil/cu mm [facility]     HEMOGLOBIN  16.1 (H) 12.0 - 16.0 g/dL [facility]     HEMATOCRIT  49.3 33.0 - 51.0 % [facility]     MCV  90 80 - 100 fL [facility]     MCH  29.3 26.0 - 34.0 pg [facility]     MCHC  32.7 32.0 - 36.0 g/dL [facility]     RDW  13.9 11.5 - 15.5 % [facility]     PLATELET COUNT  421 140 - 440 thou/cu mm [facility]     MPV  9.3 6.5 - 11.0 fL [facility]     NEUTROPHILS  86.7 % [facility]     LYMPHOCYTES  6.8 % [facility]     MONOCYTES  5.6 % [facility]     EOSINOPHILS  0.3 % [facility]     BASOPHILS  0.2 % [facility]     IMMATURE GRANULOCYTES(METAS,MYELOS,PROS) 0.4 %  [facility]     ABSOLUTE NEUTROPHILS  16.4 (H) 1.7 - 7.0 thou/cu [facility]     ABSOLUTE LYMPHOCYTES  1.3 0.9 - 2.9 thou/cu [facility]     ABSOLUTE MONOCYTES  1.1 (H) <0.9 ou/cu [facility]     ABSOLUTE EOSINOPHILS  0.1 <0.5 thou/cu [facility]     ABSOLUTE BASOPHILS  0.0 <0.3 ou/cu [facility]     ABSOLUTE IMMATURE GRANULOCYTES(METAS,MYELOS,PROS) 0.1 <0.3 ou/cu [facility]        Jose Maza PA-C

## 2021-09-15 NOTE — PHARMACY-ADMISSION MEDICATION HISTORY
Pharmacy Note - Admission Medication History    Pertinent Provider Information: can bring inhalers     ______________________________________________________________________    Prior To Admission (PTA) med list completed and updated in EMR.       PTA Med List   Medication Sig Last Dose     albuterol (PROVENTIL HFA;VENTOLIN HFA) 90 mcg/actuation inhaler [ALBUTEROL (PROVENTIL HFA;VENTOLIN HFA) 90 MCG/ACTUATION INHALER] Inhale 1-2 puffs every 4 (four) hours as needed for wheezing or shortness of breath. 9/14/2021 at can bring     carboxymethylcellulose PF (REFRESH LIQUIGEL) 1 % ophthalmic gel Place 1 drop into both eyes 3 times daily as needed for dry eyes PRN     cholecalciferol, vitamin D3, 2,000 unit Tab [CHOLECALCIFEROL, VITAMIN D3, 2,000 UNIT TAB] Take 2,000 Units by mouth once daily.  9/14/2021 at Unknown time     fluticasone (FLONASE) 50 mcg/actuation nasal spray [FLUTICASONE (FLONASE) 50 MCG/ACTUATION NASAL SPRAY] 1 spray into each nostril daily as needed for rhinitis. Past Week at can bring if needed     gabapentin (NEURONTIN) 100 MG capsule Take 100 mg by mouth nightly as needed (additional 100 mg dose if needed)  PRN     gabapentin (NEURONTIN) 100 MG capsule [GABAPENTIN (NEURONTIN) 100 MG CAPSULE] Take 100 mg by mouth at bedtime.  9/14/2021 at Unknown time     lansoprazole (PREVACID) 30 MG capsule Take 30 mg by mouth At Bedtime  9/14/2021 at Unknown time     mometasone-formoterol (DULERA) 200-5 mcg/actuation HFAA inhaler [MOMETASONE-FORMOTEROL (DULERA) 200-5 MCG/ACTUATION HFAA INHALER] Inhale 2 puffs once daily. 9/14/2021 at am     polyethylene glycol (MIRALAX) 17 g packet Take 17 g by mouth At Bedtime 9/14/2021 at Unknown time     umeclidinium-vilanteroL (ANORO ELLIPTA) 62.5-25 mcg/actuation inhaler [UMECLIDINIUM-VILANTEROL (ANORO ELLIPTA) 62.5-25 MCG/ACTUATION INHALER] Inhale 1 puff daily. 9/14/2021 at am       Information source(s): Patient and CareEverywhere/SureScripts  Method of interview  communication: phone    Summary of Changes to PTA Med List  New: miralax, refresh  Discontinued: milk of mag, tylenol  Changed: updated gabapentin dosing    Patient was asked about OTC/herbal products specifically.  PTA med list reflects this.    In the past week, patient estimated taking medication this percent of the time:  greater than 90%.    Allergies were reviewed, assessed, and updated with the patient.      Medications currently not available for use during hospital stay. Family/Patient representative states they will bring inhalers, other bulk meds to [facility].    The information provided in this note is only as accurate as the sources available at the time of the update(s).    Thank you for the opportunity to participate in the care of this patient.    Manju Sousa [facility]  9/15/2021 12:48 PM

## 2021-09-16 VITALS
WEIGHT: 182.8 LBS | RESPIRATION RATE: 18 BRPM | TEMPERATURE: 97.6 F | OXYGEN SATURATION: 94 % | HEART RATE: 66 BPM | HEIGHT: 68 IN | BODY MASS INDEX: 27.71 KG/M2 | DIASTOLIC BLOOD PRESSURE: 58 MMHG | SYSTOLIC BLOOD PRESSURE: 120 MMHG

## 2021-09-16 PROBLEM — J01.91 ACUTE RECURRENT SINUSITIS, UNSPECIFIED LOCATION: Status: ACTIVE | Noted: 2021-09-16

## 2021-09-16 LAB
ANION GAP SERPL CALCULATED.3IONS-SCNC: 4 MMOL/L (ref 5–18)
BUN SERPL-MCNC: 15 MG/DL (ref 8–28)
C COLI+JEJUNI+LARI FUSA STL QL NAA+PROBE: NOT DETECTED
C DIFF TOX B STL QL: NEGATIVE
CALCIUM SERPL-MCNC: 8.2 MG/DL (ref 8.5–10.5)
CHLORIDE BLD-SCNC: 108 MMOL/L (ref 98–107)
CO2 SERPL-SCNC: 28 MMOL/L (ref 22–31)
CREAT SERPL-MCNC: 0.87 MG/DL (ref 0.6–1.1)
EC STX1 GENE STL QL NAA+PROBE: NOT DETECTED
EC STX2 GENE STL QL NAA+PROBE: NOT DETECTED
ERYTHROCYTE [DISTWIDTH] IN BLOOD BY AUTOMATED COUNT: 13.2 % (ref 10–15)
GFR SERPL CREATININE-BSD FRML MDRD: 63 ML/MIN/1.73M2
GLUCOSE BLD-MCNC: 104 MG/DL (ref 70–125)
HCT VFR BLD AUTO: 41.9 % (ref 35–47)
HGB BLD-MCNC: 13.5 G/DL (ref 11.7–15.7)
MCH RBC QN AUTO: 29.9 PG (ref 26.5–33)
MCHC RBC AUTO-ENTMCNC: 32.2 G/DL (ref 31.5–36.5)
MCV RBC AUTO: 93 FL (ref 78–100)
NOROV GI+II ORF1-ORF2 JNC STL QL NAA+PR: NOT DETECTED
PLATELET # BLD AUTO: 301 10E3/UL (ref 150–450)
POTASSIUM BLD-SCNC: 4.3 MMOL/L (ref 3.5–5)
RBC # BLD AUTO: 4.52 10E6/UL (ref 3.8–5.2)
RVA NSP5 STL QL NAA+PROBE: NOT DETECTED
SALMONELLA SP RPOD STL QL NAA+PROBE: NOT DETECTED
SARS-COV-2 RNA RESP QL NAA+PROBE: NEGATIVE
SHIGELLA SP+EIEC IPAH STL QL NAA+PROBE: NOT DETECTED
SODIUM SERPL-SCNC: 140 MMOL/L (ref 136–145)
V CHOL+PARA RFBL+TRKH+TNAA STL QL NAA+PR: NOT DETECTED
WBC # BLD AUTO: 9.8 10E3/UL (ref 4–11)
Y ENTERO RECN STL QL NAA+PROBE: NOT DETECTED

## 2021-09-16 PROCEDURE — C9113 INJ PANTOPRAZOLE SODIUM, VIA: HCPCS | Performed by: FAMILY MEDICINE

## 2021-09-16 PROCEDURE — 80048 BASIC METABOLIC PNL TOTAL CA: CPT | Performed by: FAMILY MEDICINE

## 2021-09-16 PROCEDURE — 85027 COMPLETE CBC AUTOMATED: CPT | Performed by: FAMILY MEDICINE

## 2021-09-16 PROCEDURE — 36415 COLL VENOUS BLD VENIPUNCTURE: CPT | Performed by: FAMILY MEDICINE

## 2021-09-16 PROCEDURE — 250N000013 HC RX MED GY IP 250 OP 250 PS 637: Performed by: FAMILY MEDICINE

## 2021-09-16 PROCEDURE — 258N000003 HC RX IP 258 OP 636: Performed by: FAMILY MEDICINE

## 2021-09-16 PROCEDURE — 250N000011 HC RX IP 250 OP 636: Performed by: FAMILY MEDICINE

## 2021-09-16 PROCEDURE — 99239 HOSP IP/OBS DSCHRG MGMT >30: CPT | Performed by: FAMILY MEDICINE

## 2021-09-16 PROCEDURE — 99231 SBSQ HOSP IP/OBS SF/LOW 25: CPT | Performed by: SURGERY

## 2021-09-16 RX ORDER — FLUTICASONE PROPIONATE 50 MCG
1 SPRAY, SUSPENSION (ML) NASAL DAILY
Qty: 11.1 ML | Refills: 0 | Status: SHIPPED | OUTPATIENT
Start: 2021-09-17

## 2021-09-16 RX ORDER — ACETAMINOPHEN 325 MG/1
650 TABLET ORAL EVERY 6 HOURS PRN
COMMUNITY
Start: 2021-09-16 | End: 2021-09-21

## 2021-09-16 RX ADMIN — PIPERACILLIN AND TAZOBACTAM 3.38 G: 3; .375 INJECTION, POWDER, LYOPHILIZED, FOR SOLUTION INTRAVENOUS at 07:36

## 2021-09-16 RX ADMIN — FLUTICASONE PROPIONATE 1 SPRAY: 50 SPRAY, METERED NASAL at 10:31

## 2021-09-16 RX ADMIN — PIPERACILLIN AND TAZOBACTAM 3.38 G: 3; .375 INJECTION, POWDER, LYOPHILIZED, FOR SOLUTION INTRAVENOUS at 14:11

## 2021-09-16 RX ADMIN — SODIUM CHLORIDE: 9 INJECTION, SOLUTION INTRAVENOUS at 06:02

## 2021-09-16 RX ADMIN — UMECLIDINIUM BROMIDE AND VILANTEROL TRIFENATATE 1 PUFF: 62.5; 25 POWDER RESPIRATORY (INHALATION) at 11:22

## 2021-09-16 RX ADMIN — PANTOPRAZOLE SODIUM 40 MG: 40 INJECTION, POWDER, FOR SOLUTION INTRAVENOUS at 07:34

## 2021-09-16 RX ADMIN — FLUTICASONE FUROATE AND VILANTEROL TRIFENATATE 1 PUFF: 200; 25 POWDER RESPIRATORY (INHALATION) at 11:22

## 2021-09-16 ASSESSMENT — MIFFLIN-ST. JEOR: SCORE: 1342.68

## 2021-09-16 NOTE — PLAN OF CARE
Problem: Adult Inpatient Plan of Care  Goal: Plan of Care Review  Outcome: Improving     Problem: COPD Comorbidity  Goal: Maintenance of COPD Symptom Control  Outcome: Improving     No complaints of pain.   2L O2 via NC and sats are in the low to mid 90's.  No nausea/vomiting or loose stools overnight.   Patient has hypoactive bowel sounds, and denies passing flatus.

## 2021-09-16 NOTE — PLAN OF CARE
Problem: COPD Comorbidity  Goal: Maintenance of COPD Symptom Control  Outcome: Improving     Problem: Risk for Delirium  Goal: Optimal Coping  Outcome: Improving  Goal: Improved Behavioral Control  Outcome: Improving  Goal: Improved Attention and Thought Clarity  Outcome: Improving  Goal: Improved Sleep  Outcome: Improving   No complaints of pain.  Pt has been weaned off of O2 and her saturations are in the low 90's.  Dr. Tang wants her to get one more dose of of Zosyn and then she can discharge.  After talking to pharmacy, they stated we can run the last dose over 30 minutes.  Pt is a standby assist to the bathroom.  Will continue to monitor.

## 2021-09-16 NOTE — DISCHARGE SUMMARY
[facility] MEDICINE  DISCHARGE SUMMARY     Primary Care Physician: Myra Stein  Admission Date: 9/15/2021   Discharge Provider: Mauro Tang MD Discharge Date: 9/16/2021   Diet:   Active Diet and Nourishment Order   Procedures     Regular Diet Adult     Diet     Code Status: No CPR- Do NOT Intubate   Activity: DCACTIVITY: Activity as tolerated  Utilize home oxygen 2 to 3 L with activity      Condition at Discharge: Stable     REASON FOR PRESENTATION(See Admission Note for Details)   Abdominal pain    PRINCIPAL & ACTIVE DISCHARGE DIAGNOSES     Principal Problem:    Colitis  Active Problems:    COPD (chronic obstructive pulmonary disease) (H)    Restless leg    SBO (small bowel obstruction) (H)    Acute recurrent sinusitis, unspecified location  GERD  Raynaud's    PENDING LABS     Unresulted Labs Ordered in the Past 30 Days of this Admission     Date and Time Order Name Status Description    9/15/2021  3:17 PM Blood Culture Hand, Right Preliminary     9/15/2021  3:17 PM Blood Culture Peripheral Blood Preliminary     9/15/2021  3:17 PM Enteric Bacteria and Virus Panel by [facility]DELA Stool In process         PROCEDURES ( this hospitalization only)      None    RECOMMENDATIONS TO OUTPATIENT PROVIDER FOR F/U VISIT   Follow-up Appointments     Follow-up and recommended labs and tests      1. Follow-up with primary care provider within 1 week.2. Follow-up   abdominal CT in 4 weeks.3. Follow-up colonoscopy in 6 weeks.  4. Establish with a pulmonologist.               DISPOSITION     Home    SUMMARY OF HOSPITAL COURSE:      81-year-old female past medical history for COPD oxygen dependent with activity, RLS, spinal stenosis and Raynaud's.  July 2020 hospitalized for perforated diverticulitis with SBO.  Follow-up colonoscopy recommended.  Patient did not pursue this due to caring for her  with cancer.  9/15 had similar abdominal pain.  Seen in the [facility] ER and CT scan showed  partial SBO with thickened adjacent segment concerning for possible fistula.  Directly admitted to [facility].    1.  GI.  Patient was treated with intravenous ceftriaxone and Flagyl initially.  General surgery and GI was consulted.  Antibiotics changed to Zosyn to help with likely acute on chronic sinusitis treatment.  Had rapid improvement in her symptoms faster than expected.  At discharge was tolerating a regular diet without difficulty.  Having bowel movements.  Recommend repeat CT scan in 1 month and colonoscopy in 6 weeks.  Critical importance of this to avoid future recurrence of SBO's discussed in detail with the patient.  Will be discharged on Augmentin.    2.  ENT.  Patient dealing with acute on chronic right-sided sinusitis and green nasal discharge.  As above switched to Zosyn and will be discharged on Augmentin.  Patient should also do her Flonase nasal sprays on a scheduled basis.    3.  Pulmonary.  Patient was stable from a pulmonary standpoint.  Noted by pharmacy that she is on dual dosing of beta-2 agonists with her formoterol and vilanterol.  Has been filling these for some time.  Will continue these inhalers as her breathing is baseline but recommend follow-up with primary or establish with a pulmonologist to perhaps adjust inhalers if indicated.      Discharge Medications with Med changes:     Current Discharge Medication List      START taking these medications    Details   acetaminophen (TYLENOL) 325 MG tablet Take 2 tablets (650 mg) by mouth every 6 hours as needed for mild pain or other (and adjunct with moderate or severe pain or per patient request)    Associated Diagnoses: Colitis      amoxicillin-clavulanate (AUGMENTIN) 875-125 MG tablet Take 1 tablet by mouth 2 times daily for 13 days  Qty: 26 tablet, Refills: 0    Associated Diagnoses: Colitis; Acute recurrent sinusitis, unspecified location         CONTINUE these medications which have CHANGED    Details   fluticasone (FLONASE) 50  MCG/ACT nasal spray Spray 1 spray into both nostrils daily  Qty: 11.1 mL, Refills: 0    Associated Diagnoses: Acute recurrent sinusitis, unspecified location         CONTINUE these medications which have NOT CHANGED    Details   albuterol (PROVENTIL HFA;VENTOLIN HFA) 90 mcg/actuation inhaler [ALBUTEROL (PROVENTIL HFA;VENTOLIN HFA) 90 MCG/ACTUATION INHALER] Inhale 1-2 puffs every 4 (four) hours as needed for wheezing or shortness of breath.      carboxymethylcellulose PF (REFRESH LIQUIGEL) 1 % ophthalmic gel Place 1 drop into both eyes 3 times daily as needed for dry eyes      cholecalciferol, vitamin D3, 2,000 unit Tab [CHOLECALCIFEROL, VITAMIN D3, 2,000 UNIT TAB] Take 2,000 Units by mouth once daily.       !! gabapentin (NEURONTIN) 100 MG capsule Take 100 mg by mouth nightly as needed (additional 100 mg dose if needed)       !! gabapentin (NEURONTIN) 100 MG capsule [GABAPENTIN (NEURONTIN) 100 MG CAPSULE] Take 100 mg by mouth at bedtime.       lansoprazole (PREVACID) 30 MG capsule Take 30 mg by mouth At Bedtime       mometasone-formoterol (DULERA) 200-5 mcg/actuation HFAA inhaler [MOMETASONE-FORMOTEROL (DULERA) 200-5 MCG/ACTUATION HFAA INHALER] Inhale 2 puffs once daily.      polyethylene glycol (MIRALAX) 17 g packet Take 17 g by mouth At Bedtime      umeclidinium-vilanteroL (ANORO ELLIPTA) 62.5-25 mcg/actuation inhaler [UMECLIDINIUM-VILANTEROL (ANORO ELLIPTA) 62.5-25 MCG/ACTUATION INHALER] Inhale 1 puff daily.       !! - Potential duplicate medications found. Please discuss with provider.            Rationale for medication changes:      Tylenol as needed.  Augmentin for colitis and sinusitis.  Scheduled Flonase for sinusitis.      Consults     GASTROENTEROLOGY IP CONSULT  SURGERY GENERAL IP CONSULT  PHARMACY IP CONSULT      Immunizations given this encounter     Most Recent Immunizations   Administered Date(s) Administered     COVID-19,PF,Pfizer 04/02/2021     Flu, Unspecified 09/11/2014     Pneumococcal 23 valent  08/22/2013           Anticoagulation Information      Recent INR results: No results for input(s): INR in the last 168 hours.  Warfarin doses (if applicable) or name of other anticoagulant: na      SIGNIFICANT IMAGING FINDINGS     No results found for this visit on 09/15/21.    SIGNIFICANT LABORATORY FINDINGS     Most Recent 3 CBC's:Recent Labs   Lab Test 09/16/21  0631 08/02/20  0522 08/01/20  0559   WBC 9.8 14.2* 13.2*   HGB 13.5 12.3 12.7   MCV 93 89 91    422 358     Most Recent 3 BMP's:Recent Labs   Lab Test 09/16/21  0631 08/02/20  0522 08/01/20  0559    139 140   POTASSIUM 4.3 3.6 3.9   CHLORIDE 108* 107 109*   CO2 28 24 23   BUN 15 12 16   CR 0.87 0.68 0.73   ANIONGAP 4* 8 8   HANG 8.2* 7.9* 8.1*    98 84     Most Recent 2 LFT's:Recent Labs   Lab Test 07/30/20  1602   AST 19   ALT 11   ALKPHOS 99   BILITOTAL 0.5         Discharge Orders        Medication Therapy Management Referral      Reason for your hospital stay    Small bowel obstruction with inflammatory changes/diverticulitis versus fistula     Follow-up and recommended labs and tests    1. Follow-up with primary care provider within 1 week.2. Follow-up abdominal CT in 4 weeks.3. Follow-up colonoscopy in 6 weeks.  4. Establish with a pulmonologist.     Activity    Your activity upon discharge: activity as tolerated     Discharge Instructions    1.  Review with primary care/pulmonologist your inhalers. Currently on double dosing of long acting beta2 agonists (fomoterol and vilanterol).  May want to consider changing one of those.  2.  Resume home oxygen 2 to 3 L with activity.     Diet    Follow this diet upon discharge: Orders Placed This Encounter      Regular Diet Adult       Examination   Physical Exam   Temp:  [97.6  F (36.4  C)-99  F (37.2  C)] 97.6  F (36.4  C)  Pulse:  [66-83] 66  Resp:  [16-18] 18  BP: (120-132)/(58-61) 120/58  SpO2:  [84 %-98 %] 94 %  Wt Readings from Last 4 Encounters:   09/16/21 82.9 kg (182 lb 12.8 oz)    05/28/15 94.3 kg (208 lb)       Constitutional: awake, alert, cooperative, no apparent distress, and appears stated age  Eyes: Lids and lashes normal, pupils equal, round and reactive to light, extra ocular muscles intact, sclera clear, conjunctiva normal  ENT: Normocephalic, without obvious abnormality, atraumatic, sinuses nontender on palpation, external ears without lesions, oral pharynx with moist mucous membranes, tonsils without erythema or exudates, gums normal and good dentition.  Respiratory: No increased work of breathing, good air exchange, clear to auscultation bilaterally, no crackles or wheezing  Cardiovascular: Normal apical impulse, regular rate and rhythm, normal S1 and S2, no S3 or S4, and no murmur noted  GI: No scars, normal bowel sounds, soft, non-distended, non-tender, no masses palpated, no hepatosplenomegally  Skin: normal skin color, texture, turgor, no redness, warmth, or swelling, and no rashes  Musculoskeletal: There is no redness, warmth, or swelling of the joints.  Full range of motion noted.  Motor strength is 5 out of 5 all extremities bilaterally.  Tone is normal. no lower extremity pitting edema present  Neurologic: Cranial nerves II-XII are grossly intact. Sensory:  Sensory intact  Neuropsychiatric: General: normal, calm and normal eye contact Level of consciousness: alert / normal Affect: normal Orientation: oriented to self, place, time and situation Memory and insight: normal, memory for past and recent events intact and thought process normal      Please see EMR for more detailed significant labs, imaging, consultant notes etc.    IMauro MD, personally saw the patient today and spent greater than 30 minutes discharging this patient.    Mauro Tang MD  [facility]    CC:Myra Stein

## 2021-09-16 NOTE — PROGRESS NOTES
General Surgery Progress Note    Subjective:   Patient feeling significantly better.  Has no abdominal pain.  Has not had any further nausea or vomiting.  Her diarrhea has resolved.  She is passing flatus.  Is hungry.    Vitals:    09/15/21 2350 09/16/21 0002 09/16/21 0522 09/16/21 0816   BP: 123/59   120/58   BP Location: Right arm   Left arm   Pulse: 83   66   Resp: 18   18   Temp: 99  F (37.2  C)   97.6  F (36.4  C)   TempSrc: Oral   Oral   SpO2: (!) 84% 93%  92%   Weight:   82.9 kg (182 lb 12.8 oz)    Height:           09/15 0700 - 09/16 0659  In: 1478 [P.O.:60; I.V.:1418]  Out: 275 [Urine:100]    Physical Exam:  General: NAD, pleasant  ABD: Soft, nondistended, nontender to palpation    Recent Labs   Lab 09/16/21  0631   WBC 9.8   HGB 13.5   HCT 41.9          Recent Labs   Lab 09/16/21  0631      CO2 28   BUN 15       Assessment:   81-year-old female who presented with nausea, vomiting, and diarrhea.  Concern for pSBO on CT.  Symptoms resolved.    Plan:   Advance to regular diet.  Plans on having outpatient colonoscopy with GI after discharge  No plans for general surgical intervention.    Sylvie Cramer DO  General Surgeon  [facility]  Surgery [facility] - [address]  [address]  [place] [address]  Office: [phone]  Employed by - [facility]

## 2021-09-16 NOTE — PROGRESS NOTES
AVS review with patient and daughter at bedside, pt to pickup medications at pharmacy, understand regime, all belongings send with.

## 2021-09-16 NOTE — PLAN OF CARE
Patient vitals stable. Alert and oriented. Denies pain, just tender LRQ  When palpated. Patientt had one large emesis 175 ml. Fluids running at 125. NG needs to be placed if has another emesis. Stool sample sent.

## 2021-09-16 NOTE — PROGRESS NOTES
"GI PROGRESS NOTE  9/16/2021  Blessing Castellanos  1940  /-56    Subjective:  She feels much better today with only mild gassiness.  Cramping has resolved.  She did have vomiting yesterday afternoon which has resolved.  No further diarrhea.  She is passing flatus.     Objective:    Patient Vitals for the past 24 hrs:   BP Temp Temp src Pulse Resp SpO2 Height Weight   09/16/21 0816 120/58 97.6  F (36.4  C) Oral 66 18 92 % -- --   09/16/21 0522 -- -- -- -- -- -- -- 82.9 kg (182 lb 12.8 oz)   09/16/21 0002 -- -- -- -- -- 93 % -- --   09/15/21 2350 123/59 99  F (37.2  C) Oral 83 18 (!) 84 % -- --   09/15/21 1534 132/61 98  F (36.7  C) Oral 83 16 95 % -- --   09/15/21 1036 129/63 97.9  F (36.6  C) Oral 87 18 92 % 1.727 m (5' 8\") 75.2 kg (165 lb 12.8 oz)     Body mass index is 27.79 kg/m .  Gen: NAD  GI: Non-distended, BS positive, soft, non-tender    Laboratory  Recent Labs   Lab Test 09/16/21 0631 08/02/20  0522 08/01/20  0559   WBC 9.8 14.2* 13.2*   HGB 13.5 12.3 12.7   MCV 93 89 91    422 358     Recent Labs   Lab Test 09/16/21 0631 08/02/20  0522 08/01/20  0559    139 140   POTASSIUM 4.3 3.6 3.9   CHLORIDE 108* 107 109*   CO2 28 24 23   BUN 15 12 16   CR 0.87 0.68 0.73   ANIONGAP 4* 8 8   HANG 8.2* 7.9* 8.1*    98 84     Recent Labs   Lab Test 07/30/20  1602   ALBUMIN 3.8   BILITOTAL 0.5   ALT 11   AST 19   ALKPHOS 99   LIPASE 10     C. difficile negative, stool culture pending  CEA 0.8 normal  Blood cultures no growth to date    9/15/2021 CT abdomen/pelvis with contrast ([facility])  IMPRESSION:   1.  Abnormal exam with moderate segment of wall thickening and hyperenhancement of the sigmoid colon which extends to and involves an adjacent segment of small bowel with findings suggesting partial small bowel obstruction. The appearance is atypical for acute diverticulitis and this may be due to chronic inflammatory bowel disease such as ulcerative colitis with developing fistulous disease " with the adjacent small bowel. Chronic diverticulitis could have a similar appearance although long segment of sigmoid colonic involvement would be atypical and colonoscopy will likely be needed for tissue biopsy and confirm this is benign wall thickening. Findings communicated to the emergency room physician by telephone 9/15/2021 at 8:30 AM.     2.  No abscess, free air, or additional complication.     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  She did not have follow-up colonoscopy.  Less likely to be IBD.  Concern for possible malignancy.  Clinically she is again improved with antibiotics.    Patient Active Problem List   Diagnosis     Status post left hip replacement     PONV (postoperative nausea and vomiting)     Raynaud's disease     COPD (chronic obstructive pulmonary disease) (H)     Restless leg     GERD (gastroesophageal reflux disease)     SBO (small bowel obstruction) (H)     Colitis        Plan:    1. Defer diet to surgery team.   2.  Continue antibiotics x 14 day course.  3.  Repeat a CT in 4 weeks.  4.  Plan outpatient colonoscopy in 6 weeks if no contraindication on CT.  We'll order CT and colonoscopy.  5.  GI following.    Adriane Vincent PA-C  [facility]  [phone]

## 2021-09-16 NOTE — PROGRESS NOTES
Care Management Follow Up    Length of Stay (days): 1    Expected Discharge Date: 09/17/2021     Concerns to be Addressed:       Patient plan of care discussed at interdisciplinary rounds: Yes    Anticipated Discharge Disposition:       Anticipated Discharge Services:    Anticipated Discharge DME:      Patient/family educated on Medicare website which has current facility and service quality ratings:    Education Provided on the Discharge Plan:    Patient/Family in Agreement with the Plan:      Referrals Placed by CM/SW:    Private pay costs discussed: Not applicable    Additional Information:  Chart reviewed, pt is independent at baseline.  Anticipate pt will return home at discharge.      RG Espinoza

## 2021-09-17 ENCOUNTER — PATIENT OUTREACH (OUTPATIENT)
Dept: CARE COORDINATION | Facility: CLINIC | Age: 81
End: 2021-09-17

## 2021-09-17 DIAGNOSIS — Z71.89 OTHER SPECIFIED COUNSELING: ICD-10-CM

## 2021-09-17 NOTE — PROGRESS NOTES
Clinic Care Coordination Contact  [facility]/Voicemail       Clinical Data: Care Coordinator Outreach  Outreach attempted x 1.  Left message on patient's voicemail with call back information and requested return call.  Plan: Care Coordinator will try to reach patient again in 1-2 business days.    MARY Stone  [phone]  [facility]

## 2021-09-18 NOTE — PROGRESS NOTES
Clinic Care Coordination Contact  [facility]: Post-Discharge Note  SITUATION                                                      Admission:    Admission Date: 09/15/21   Reason for Admission: Abdominal pain  Discharge:   Discharge Date: 09/16/21  Discharge Diagnosis: Colitis    BACKGROUND                                                      81-year-old female past medical history for COPD oxygen dependent with activity, RLS, spinal stenosis and Raynaud's.  July 2020 hospitalized for perforated diverticulitis with SBO.  Follow-up colonoscopy recommended.  Patient did not pursue this due to caring for her  with cancer.  9/15 had similar abdominal pain.  Seen in the [place] ER and CT scan showed partial SBO with thickened adjacent segment concerning for possible fistula.  Directly admitted to [facility].     1.  GI.  Patient was treated with intravenous ceftriaxone and Flagyl initially.  General surgery and GI was consulted.  Antibiotics changed to Zosyn to help with likely acute on chronic sinusitis treatment.  Had rapid improvement in her symptoms faster than expected.  At discharge was tolerating a regular diet without difficulty.  Having bowel movements.  Recommend repeat CT scan in 1 month and colonoscopy in 6 weeks.  Critical importance of this to avoid future recurrence of SBO's discussed in detail with the patient.  Will be discharged on Augmentin.     2.  ENT.  Patient dealing with acute on chronic right-sided sinusitis and green nasal discharge.  As above switched to Zosyn and will be discharged on Augmentin.  Patient should also do her Flonase nasal sprays on a scheduled basis.     3.  Pulmonary.  Patient was stable from a pulmonary standpoint.  Noted by pharmacy that she is on dual dosing of beta-2 agonists with her formoterol and vilanterol.  Has been filling these for some time.  Will continue these inhalers as her breathing is baseline but recommend follow-up with primary or establish with a  "pulmonologist to perhaps adjust inhalers if indicated    ASSESSMENT      Enrollment  Primary Care Care Coordination Status: Not a Candidate    Discharge Assessment  How are you doing now that you are home?: \" I'm getting there slowly\"  How are your symptoms? (Red Flag symptoms escalate to triage hotline per guidelines): Improved  Do you feel your condition is stable enough to be safe at home until your provider visit?: Yes  Does the patient have their discharge instructions? : Yes  Does the patient have questions regarding their discharge instructions? : No  Were you started on any new medications or were there changes to any of your previous medications? : Yes  Does the patient have all of their medications?: Yes  Do you have questions regarding any of your medications? : No  Do you have all of your needed medical supplies or equipment (DME)?  (i.e. oxygen tank, CPAP, cane, etc.): Yes  Discharge follow-up appointment scheduled within 14 calendar days? : No  Is patient agreeable to assistance with scheduling? : No    Post-op ([facility] Only)  If the patient had a surgery or procedure, do they have any questions for a nurse?: No             PLAN                                                      Outpatient Plan:   1. Follow-up with primary care provider within 1 week.2. Follow-up abdominal CT in 4 weeks.3. Follow-up colonoscopy in 6 weeks.  4. Establish with a pulmonologist.          Activity     Your activity upon discharge: activity as tolerated          Discharge Instructions     1.  Review with primary care/pulmonologist your inhalers. Currently on double dosing of long acting beta2 agonists (fomoterol and vilanterol).  May want to consider changing one of those.  2.  Resume home oxygen 2 to 3 L with activity.          Diet     Follow this diet upon discharge: Orders Placed This Encounter      Regular Diet Adult         Future Appointments   Date Time Provider Department Center   9/21/2021 10:00 AM Rayne Guzman, " PharmD [facility]         For any urgent concerns, please contact our 24 hour nurse triage line: [phone] ([phone])         Gaviota Whitley MA

## 2021-09-20 LAB
BACTERIA BLD CULT: NO GROWTH
BACTERIA BLD CULT: NO GROWTH

## 2021-09-21 ENCOUNTER — VIRTUAL VISIT (OUTPATIENT)
Dept: PHARMACY | Facility: CLINIC | Age: 81
End: 2021-09-21
Attending: FAMILY MEDICINE
Payer: COMMERCIAL

## 2021-09-21 DIAGNOSIS — G25.81 RESTLESS LEG: ICD-10-CM

## 2021-09-21 DIAGNOSIS — K52.9 COLITIS: Primary | ICD-10-CM

## 2021-09-21 DIAGNOSIS — K21.9 GASTROESOPHAGEAL REFLUX DISEASE WITHOUT ESOPHAGITIS: ICD-10-CM

## 2021-09-21 DIAGNOSIS — J43.9 PULMONARY EMPHYSEMA, UNSPECIFIED EMPHYSEMA TYPE (H): ICD-10-CM

## 2021-09-21 PROCEDURE — 99605 MTMS BY PHARM NP 15 MIN: CPT | Performed by: PHARMACIST

## 2021-09-21 PROCEDURE — 99607 MTMS BY PHARM ADDL 15 MIN: CPT | Performed by: PHARMACIST

## 2021-09-21 NOTE — PROGRESS NOTES
Medication Therapy Management (MTM) Encounter    ASSESSMENT:                            1. Colitis  Recently hospitalized. Completing course of Augmentin.    2. Pulmonary emphysema/COPD  Stable, although duplicate therapy noted with two of her maintenance inhalers containing beta-2 agonists, formoterol (Dulera) and vilanterol (Anoro Ellipta). Recommend changing Anoro Ellipta to Incruse Ellipta to eliminate duplicate LABA, while continuing LAMA component (umeclidinium). Will discuss with PCP. Reviewed inhaler dosing/administration with patient. Advised splitting dose of Dulera to twice daily - 1 puff twice daily rather than 2 puffs daily. Rinse mouth after use. Patient verbalized understanding.     3. Restless leg/spasm  Adequately controlled with low dose gabapentin.     4. Gastroesophageal reflux disease  Well controlled on PPI.     PLAN:                            Change Dulera administration from 2 puffs once daily to 1 puff twice daily.     Consider changing Anoro Ellipta (umeclidinium-vilanterol) to Incruse Ellipta (umeclidinium) due to duplicate LABA component with Dulera. PharmD to contact PCP with recommendation.     Follow-up: PRN with MTM    SUBJECTIVE/OBJECTIVE:                          Blessing Castellanos is a 81 year old female called for a transitions of care visit. She was discharged from [facility] on 9/16/21 for colitis.      Reason for visit: Hospital discharge medication review.    Allergies/ADRs: Reviewed in chart  Past Medical History: Reviewed in chart  Tobacco: She reports that she quit smoking about 30 years ago. She has never used smokeless tobacco.  Alcohol: Reviewed in chart    Medication Adherence/Access: no issues reported    Per discharge summary:  81-year-old female past medical history for COPD oxygen dependent with activity, RLS, spinal stenosis and Raynaud's.  July 2020 hospitalized for perforated diverticulitis with SBO.  Follow-up colonoscopy recommended.  Patient did not pursue  this due to caring for her  with cancer.  9/15 had similar abdominal pain.  Seen in the [place] ER and CT scan showed partial SBO with thickened adjacent segment concerning for possible fistula.  Directly admitted to [facility].    1. Colitis  Blessing was treated with intravenous ceftriaxone and Flagyl initially.  General surgery and GI was consulted.  Antibiotics changed to Zosyn to help with likely acute on chronic sinusitis treatment.  Had rapid improvement in her symptoms faster than expected.  At discharge was tolerating a regular diet without difficulty.  Having bowel movements.  Recommend repeat CT scan in 1 month and colonoscopy in 6 weeks.  She was discharged on Augmentin. Verifies taking medication as prescribed and denies side effects. She takes Miralax every day to help with constipation. States she has had chronic constipation most of her life. She tries to stay well hydrated.     2. Pulmonary emphysema/COPD  Blessing is using 2 maintenance inhalers, Anoro Ellipta 65.5-25 mcg and Dulera 200-5 mcg.  She believes she has been on these inhalers for the past several years, and they have been working well for her COPD.  She uses 1 puff of the Anoro Ellipta every morning and 2 puffs of the Dulera every morning.  Verifies rinsing mouth after use.  Has albuterol rescue inhaler available to use as needed.  Typically takes 2 puffs in the morning.  Denies any shortness of breath at rest, but does say this can occur with activity.  Generally she feels that her breathing is well controlled.  She does use Flonase to help with allergies seasonally.    3. Restless leg/spasm  Blessing is taking gabapentin 100 mg at bedtime to help with leg and feet spasms which she said started after having spine surgery and nerve damage.  She does generally find it helpful, but will occasionally take a second dose before midnight if needed.  She does not want to take it too late as she will feel groggy the next day.  She also  uses Aspercreme topically to help with symptoms.    4. Gastroesophageal reflux disease  Blessing is taking lansoprazole 30 mg daily for acid reflux.  She says that this medication is effective at controlling her symptoms.    Vitals:   BP Readings from Last 3 Encounters:   09/16/21 120/58      Pulse Readings from Last 3 Encounters:   09/16/21 66     Wt Readings from Last 3 Encounters:   09/16/21 182 lb 12.8 oz (82.9 kg)   05/28/15 208 lb (94.3 kg)     ----------------  Post Discharge Medication Reconciliation Status: discharge medications reconciled, continue medications without change.    I spent 30 minutes with this patient today. I offer these suggestions with the understanding that I don't fully understand Blessing's past medical history and the complexity of her health conditions. Blessing should make no changes without the approval of her physician. A copy of the visit note was provided to the patient's primary care provider.    The patient was sent a summary of these recommendations.     Rayne Guzman, PharmD, BCACP  Medication Management (MTM) Pharmacist  [facility]       Telemedicine Visit Details  Type of service:  Telephone visit  Start Time: 10:00 AM  End Time: 10:30 AM  Originating Location (patient location): Home  Distant Location (provider location):  [facility]     Medication Therapy Recommendations  COPD (chronic obstructive pulmonary disease) (H)    Current Medication: mometasone-formoterol (DULERA) 200-5 mcg/actuation HFAA inhaler   Rationale: Frequency inappropriate - Dosage too low - Effectiveness   Recommendation: Increase Frequency - Change Dulera administration from 2 puffs once daily to 1 puff twice daily.   Status: Patient Agreed - Adherence/Education          Current Medication: umeclidinium-vilanteroL (ANORO ELLIPTA) 62.5-25 mcg/actuation inhaler   Rationale: Duplicate Therapy - Unnecessary medication therapy - Indication    Recommendation: Change Medication - Incruse Ellipta 62.5 MCG/INH Aepb Inhaler - Consider changing Anoro Ellipta to Incruse Ellipta due to duplicate LABA component.   Status: Contact Provider - Awaiting Response

## 2021-09-21 NOTE — LETTER
"        Date: 2021    Blessing Castellanos  [address] [place] [id number]    Dear Ms. Castellanos,    Thank you for talking with me on 21 about your health and medications. Medicare s MTM (Medication Therapy Management) program helps you understand your medications and use them safely.      This letter includes an action plan (Medication Action Plan) and medication list (Personal Medication List). The action plan has steps you should take to help you get the best results from your medications. The medication list will help you keep track of your medications and how to use them the right way.       Have your action plan and medication list with you when you talk with your doctors, pharmacists, and other healthcare providers in your care team.     Ask your doctors, pharmacists, and other healthcare providers to update the action plan and medication list at every visit.     Take your medication list with you if you go to the hospital or emergency room.     Give a copy of the action plan and medication list to your family or caregivers.     If you want to talk about this letter or any of the papers with it, please call   [phone].We look forward to working with you, your doctors, and other healthcare providers to help you stay healthy through the [facility] MTM program.    Sincerely,  Rayne Guzman, PharmD    Enclosed: Medication Action Plan and Personal Medication List    MEDICATION ACTION PLAN FOR Blessing Castellanos,  1940     This action plan will help you get the best results from your medications if you:   1. Read \"What we talked about.\"   2. Take the steps listed in the \"What I need to do\" boxes.   3. Fill in \"What I did and when I did it.\"   4. Fill in \"My follow-up plan\" and \"Questions I want to ask.\"     Have this action plan with you when you talk with your doctors, pharmacists, and other healthcare providers in your care team. Share this with your family or caregivers " too.  DATE PREPARED: 2021  What we talked about: Changing Dulera from 2 puffs once daily to 1 puff twice daily to help with breathing throughout the day.                                                  What I need to do: Use Dulera 1 puff twice daily. Rinse mouth after use.       What I did and when I did it:                                              What we talked about: Changing Anoro Ellipta to Incruse Ellipta, which is a similar inhaler but does not have the duplicate medication that is also in Dulera.                                            What I need to do: Discuss with Dr. Stein at your next visit. I will also send her a message with this suggestion.    What I did and when I did it:                                                My follow-up plan:                 Questions I want to ask:              If you have any questions about your action plan, call Rayne Guzman, AryaD, Phone: [phone] , Monday-Friday 8-4:30pm.           PERSONAL MEDICATION LIST FOR Blessing Castellanos,  1940     This medication list was made for you after we talked. We also used information from your doctor's chart.      Use blank rows to add new medications. Then fill in the dates you started using them.    Cross out medications when you no longer use them. Then write the date and why you stopped using them.    Ask your doctors, pharmacists, and other healthcare providers to update this list at every visit. Keep this list up-to-date with:       Prescription medications    Over the counter drugs     Herbals    Vitamins    Minerals      If you go to the hospital or emergency room, take this list with you. Share this with your family or caregivers too.     DATE PREPARED: 2021  Allergies or side effects: Doxycycline calcium [doxycycline], Mirapex [pramipexole], Prilosec [omeprazole], Sulfa (sulfonamide antibiotics) [sulfa drugs], Zithromax [azithromycin], and Antihistamines - alkylamine [alkylamines]      Medication:  AMOXICILLIN-POT CLAVULANATE 875-125 MG PO TABS      How I use it:  Take 1 tablet by mouth 2 times daily for 13 days      Why I use it: Antibiotic for colitis; sinusitis    Prescriber:  Mauro Tang MD      Date I started using it:       Date I stopped using it:         Why I stopped using it:            Medication:  CARBOXYMETHYLCELLULOSE SOD PF 1 % OP GEL      How I use it:  Place 1 drop into both eyes 3 times daily as needed for dry eyes      Why I use it:  Dry eyes    Prescriber:  Myra Stein MD      Date I started using it:       Date I stopped using it:         Why I stopped using it:            Medication:  FLUTICASONE PROPIONATE 50 MCG/ACT NA SUSP      How I use it:  Spray 1 spray into both nostrils daily      Why I use it: Allergies/sinusitis    Prescriber:  Mauro Tang MD      Date I started using it:       Date I stopped using it:         Why I stopped using it:            Medication:  POLYETHYLENE GLYCOL 3350 17 G PO PACK      How I use it:  Take 17 g by mouth At Bedtime      Why I use it:  Constipation    Prescriber:  Self      Date I started using it:       Date I stopped using it:         Why I stopped using it:            Medication:  albuterol (PROVENTIL HFA;VENTOLIN HFA) 90 mcg/actuation inhaler      How I use it:  Inhale 1-2 puffs every 4 (four) hours as needed for wheezing or shortness of breath.      Why I use it:  COPD    Prescriber:  Myra Stein MD      Date I started using it:       Date I stopped using it:         Why I stopped using it:            Medication:  cholecalciferol, vitamin D3, 2,000 unit Tab      How I use it:   Take 2,000 Units by mouth once daily.       Why I use it:  Vitamin D deficiency    Prescriber:  Myra Stein MD      Date I started using it:       Date I stopped using it:         Why I stopped using it:            Medication:  gabapentin (NEURONTIN) 100 MG capsule      How I use it:  Take 100-200 mg by mouth At Bedtime      Why I use  it:  Leg spasms    Prescriber:  Myra Stein MD      Date I started using it:       Date I stopped using it:         Why I stopped using it:            Medication:  lansoprazole (PREVACID) 30 MG capsule      How I use it:  Take 30 mg by mouth At Bedtime       Why I use it:  Acid reflux    Prescriber:  Myra Stein MD      Date I started using it:       Date I stopped using it:         Why I stopped using it:            Medication:  mometasone-formoterol (DULERA) 200-5 mcg/actuation HFAA inhaler      How I use it:  Inhale 1 puff into the lungs 2 times daily      Why I use it:  COPD    Prescriber:  Myra Stein MD      Date I started using it:       Date I stopped using it:         Why I stopped using it:            Medication:  umeclidinium-vilanteroL (ANORO ELLIPTA) 62.5-25 mcg/actuation inhaler      How I use it:   Inhale 1 puff daily.      Why I use it:  COPD    Prescriber:  Myra Stein MD      Date I started using it:       Date I stopped using it:         Why I stopped using it:            Medication:         How I use it:         Why I use it:      Prescriber:         Date I started using it:       Date I stopped using it:         Why I stopped using it:            Medication:         How I use it:         Why I use it:      Prescriber:         Date I started using it:       Date I stopped using it:         Why I stopped using it:            Medication:         How I use it:         Why I use it:      Prescriber:         Date I started using it:       Date I stopped using it:         Why I stopped using it:              Other Information:     If you have any questions about your medication list, call Rayne Guzman PharmD, Phone: [phone] , Monday-Friday 8-4:30pm.    According to the Paperwork Reduction Act of 1995, no persons are required to respond to a collection of information unless it displays a valid B control number. The valid B number for this information collection is 1769-1758. The  time required to complete this information collection is estimated to average 40 minutes per response, including the time to review instructions, searching existing data resources, gather the data needed, and complete and review the information collection. If you have any comments concerning the accuracy of the time estimate(s) or suggestions for improving this form, please write to: CMS, Attn: [employer] Reports Clearance Officer, [address] [place] [address], [place], [place] [address].

## 2021-09-30 ENCOUNTER — HOSPITAL ENCOUNTER (OUTPATIENT)
Facility: CLINIC | Age: 81
End: 2021-09-30
Attending: INTERNAL MEDICINE | Admitting: INTERNAL MEDICINE
Payer: COMMERCIAL

## 2021-11-22 DIAGNOSIS — Z11.59 ENCOUNTER FOR SCREENING FOR OTHER VIRAL DISEASES: ICD-10-CM

## 2021-12-06 ENCOUNTER — NURSE TRIAGE (OUTPATIENT)
Dept: NURSING | Facility: CLINIC | Age: 81
End: 2021-12-06
Payer: COMMERCIAL

## 2021-12-06 NOTE — TELEPHONE ENCOUNTER
Patient calling [facility].  She was advised to call her own clinic at [facility].    Alana Kruger, RN  [facility] Triage/refill nurse    Additional Information    Information only question and nurse able to answer    Protocols used: INFORMATION ONLY CALL - NO TRIAGE-A-OH

## 2021-12-13 RX ORDER — ONDANSETRON 2 MG/ML
4 INJECTION INTRAMUSCULAR; INTRAVENOUS
Status: CANCELLED | OUTPATIENT
Start: 2021-12-13

## 2021-12-13 RX ORDER — LIDOCAINE 40 MG/G
CREAM TOPICAL
Status: CANCELLED | OUTPATIENT
Start: 2021-12-13

## 2022-01-02 ENCOUNTER — APPOINTMENT (OUTPATIENT)
Dept: CT IMAGING | Facility: CLINIC | Age: 82
DRG: 394 | End: 2022-01-02
Attending: EMERGENCY MEDICINE
Payer: COMMERCIAL

## 2022-01-02 ENCOUNTER — HOSPITAL ENCOUNTER (INPATIENT)
Facility: CLINIC | Age: 82
LOS: 2 days | Discharge: HOME | DRG: 394 | End: 2022-01-04
Attending: EMERGENCY MEDICINE | Admitting: HOSPITALIST
Payer: COMMERCIAL

## 2022-01-02 ENCOUNTER — APPOINTMENT (OUTPATIENT)
Dept: RADIOLOGY | Facility: CLINIC | Age: 82
DRG: 394 | End: 2022-01-02
Attending: SURGERY
Payer: COMMERCIAL

## 2022-01-02 DIAGNOSIS — K56.609 SBO (SMALL BOWEL OBSTRUCTION) (H): ICD-10-CM

## 2022-01-02 DIAGNOSIS — K52.9 COLITIS: ICD-10-CM

## 2022-01-02 DIAGNOSIS — K57.32 DIVERTICULITIS OF COLON: Primary | ICD-10-CM

## 2022-01-02 LAB
ALBUMIN SERPL-MCNC: 3.8 G/DL (ref 3.5–5)
ALP SERPL-CCNC: 91 U/L (ref 45–120)
ALT SERPL W P-5'-P-CCNC: 14 U/L (ref 0–45)
ANION GAP SERPL CALCULATED.3IONS-SCNC: 14 MMOL/L (ref 5–18)
AST SERPL W P-5'-P-CCNC: 24 U/L (ref 0–40)
BASOPHILS # BLD AUTO: 0 10E3/UL (ref 0–0.2)
BASOPHILS # BLD AUTO: 0.1 10E3/UL (ref 0–0.2)
BASOPHILS NFR BLD AUTO: 0 %
BASOPHILS NFR BLD AUTO: 0 %
BILIRUB SERPL-MCNC: 0.8 MG/DL (ref 0–1)
BUN SERPL-MCNC: 19 MG/DL (ref 8–28)
C COLI+JEJUNI+LARI FUSA STL QL NAA+PROBE: NOT DETECTED
C DIFF TOX B STL QL: NEGATIVE
CALCIUM SERPL-MCNC: 10.3 MG/DL (ref 8.5–10.5)
CHLORIDE BLD-SCNC: 99 MMOL/L (ref 98–107)
CO2 SERPL-SCNC: 25 MMOL/L (ref 22–31)
CREAT SERPL-MCNC: 0.78 MG/DL (ref 0.6–1.1)
CREAT SERPL-MCNC: 0.96 MG/DL (ref 0.6–1.1)
EC STX1 GENE STL QL NAA+PROBE: NOT DETECTED
EC STX2 GENE STL QL NAA+PROBE: NOT DETECTED
EOSINOPHIL # BLD AUTO: 0 10E3/UL (ref 0–0.7)
EOSINOPHIL # BLD AUTO: 0.1 10E3/UL (ref 0–0.7)
EOSINOPHIL NFR BLD AUTO: 0 %
EOSINOPHIL NFR BLD AUTO: 1 %
ERYTHROCYTE [DISTWIDTH] IN BLOOD BY AUTOMATED COUNT: 13.2 % (ref 10–15)
ERYTHROCYTE [DISTWIDTH] IN BLOOD BY AUTOMATED COUNT: 13.4 % (ref 10–15)
GFR SERPL CREATININE-BSD FRML MDRD: 59 ML/MIN/1.73M2
GFR SERPL CREATININE-BSD FRML MDRD: 76 ML/MIN/1.73M2
GLUCOSE BLD-MCNC: 172 MG/DL (ref 70–125)
HCT VFR BLD AUTO: 40.4 % (ref 35–47)
HCT VFR BLD AUTO: 49.1 % (ref 35–47)
HGB BLD-MCNC: 12.8 G/DL (ref 11.7–15.7)
HGB BLD-MCNC: 15.8 G/DL (ref 11.7–15.7)
IMM GRANULOCYTES # BLD: 0 10E3/UL
IMM GRANULOCYTES # BLD: 0.1 10E3/UL
IMM GRANULOCYTES NFR BLD: 0 %
IMM GRANULOCYTES NFR BLD: 0 %
LACTATE SERPL-SCNC: 1.2 MMOL/L (ref 0.7–2)
LACTOFERRIN STL QL IA: POSITIVE
LIPASE SERPL-CCNC: 10 U/L (ref 0–52)
LYMPHOCYTES # BLD AUTO: 0.8 10E3/UL (ref 0.8–5.3)
LYMPHOCYTES # BLD AUTO: 1 10E3/UL (ref 0.8–5.3)
LYMPHOCYTES NFR BLD AUTO: 4 %
LYMPHOCYTES NFR BLD AUTO: 8 %
MCH RBC QN AUTO: 28.7 PG (ref 26.5–33)
MCH RBC QN AUTO: 28.8 PG (ref 26.5–33)
MCHC RBC AUTO-ENTMCNC: 31.7 G/DL (ref 31.5–36.5)
MCHC RBC AUTO-ENTMCNC: 32.2 G/DL (ref 31.5–36.5)
MCV RBC AUTO: 89 FL (ref 78–100)
MCV RBC AUTO: 91 FL (ref 78–100)
MONOCYTES # BLD AUTO: 1 10E3/UL (ref 0–1.3)
MONOCYTES # BLD AUTO: 1 10E3/UL (ref 0–1.3)
MONOCYTES NFR BLD AUTO: 5 %
MONOCYTES NFR BLD AUTO: 9 %
NEUTROPHILS # BLD AUTO: 17.4 10E3/UL (ref 1.6–8.3)
NEUTROPHILS # BLD AUTO: 9.2 10E3/UL (ref 1.6–8.3)
NEUTROPHILS NFR BLD AUTO: 82 %
NEUTROPHILS NFR BLD AUTO: 91 %
NOROV GI+II ORF1-ORF2 JNC STL QL NAA+PR: NOT DETECTED
NRBC # BLD AUTO: 0 10E3/UL
NRBC # BLD AUTO: 0 10E3/UL
NRBC BLD AUTO-RTO: 0 /100
NRBC BLD AUTO-RTO: 0 /100
PLATELET # BLD AUTO: 366 10E3/UL (ref 150–450)
PLATELET # BLD AUTO: 454 10E3/UL (ref 150–450)
POTASSIUM BLD-SCNC: 3.9 MMOL/L (ref 3.5–5)
PROT SERPL-MCNC: 8.2 G/DL (ref 6–8)
RBC # BLD AUTO: 4.45 10E6/UL (ref 3.8–5.2)
RBC # BLD AUTO: 5.5 10E6/UL (ref 3.8–5.2)
RVA NSP5 STL QL NAA+PROBE: NOT DETECTED
SALMONELLA SP RPOD STL QL NAA+PROBE: NOT DETECTED
SARS-COV-2 RNA RESP QL NAA+PROBE: NEGATIVE
SHIGELLA SP+EIEC IPAH STL QL NAA+PROBE: NOT DETECTED
SODIUM SERPL-SCNC: 138 MMOL/L (ref 136–145)
V CHOL+PARA RFBL+TRKH+TNAA STL QL NAA+PR: NOT DETECTED
WBC # BLD AUTO: 11.3 10E3/UL (ref 4–11)
WBC # BLD AUTO: 19.4 10E3/UL (ref 4–11)
Y ENTERO RECN STL QL NAA+PROBE: NOT DETECTED

## 2022-01-02 PROCEDURE — 87506 IADNA-DNA/RNA PROBE TQ 6-11: CPT | Performed by: INTERNAL MEDICINE

## 2022-01-02 PROCEDURE — 258N000003 HC RX IP 258 OP 636: Performed by: EMERGENCY MEDICINE

## 2022-01-02 PROCEDURE — 96374 THER/PROPH/DIAG INJ IV PUSH: CPT

## 2022-01-02 PROCEDURE — 96361 HYDRATE IV INFUSION ADD-ON: CPT

## 2022-01-02 PROCEDURE — 83605 ASSAY OF LACTIC ACID: CPT | Performed by: EMERGENCY MEDICINE

## 2022-01-02 PROCEDURE — 250N000011 HC RX IP 250 OP 636: Performed by: EMERGENCY MEDICINE

## 2022-01-02 PROCEDURE — 36415 COLL VENOUS BLD VENIPUNCTURE: CPT | Performed by: INTERNAL MEDICINE

## 2022-01-02 PROCEDURE — 87493 C DIFF AMPLIFIED PROBE: CPT | Performed by: INTERNAL MEDICINE

## 2022-01-02 PROCEDURE — 258N000003 HC RX IP 258 OP 636: Performed by: HOSPITALIST

## 2022-01-02 PROCEDURE — C9803 HOPD COVID-19 SPEC COLLECT: HCPCS

## 2022-01-02 PROCEDURE — 82565 ASSAY OF CREATININE: CPT | Performed by: HOSPITALIST

## 2022-01-02 PROCEDURE — 99207 PR CDG-CORRECTLY CODED, REVIEWED AND AGREE: CPT | Performed by: HOSPITALIST

## 2022-01-02 PROCEDURE — 250N000013 HC RX MED GY IP 250 OP 250 PS 637: Performed by: INTERNAL MEDICINE

## 2022-01-02 PROCEDURE — 83630 LACTOFERRIN FECAL (QUAL): CPT | Performed by: INTERNAL MEDICINE

## 2022-01-02 PROCEDURE — 87635 SARS-COV-2 COVID-19 AMP PRB: CPT | Performed by: EMERGENCY MEDICINE

## 2022-01-02 PROCEDURE — 258N000003 HC RX IP 258 OP 636: Performed by: SURGERY

## 2022-01-02 PROCEDURE — 74018 RADEX ABDOMEN 1 VIEW: CPT

## 2022-01-02 PROCEDURE — 83690 ASSAY OF LIPASE: CPT | Performed by: EMERGENCY MEDICINE

## 2022-01-02 PROCEDURE — 85025 COMPLETE CBC W/AUTO DIFF WBC: CPT | Performed by: INTERNAL MEDICINE

## 2022-01-02 PROCEDURE — 85004 AUTOMATED DIFF WBC COUNT: CPT | Performed by: EMERGENCY MEDICINE

## 2022-01-02 PROCEDURE — 36415 COLL VENOUS BLD VENIPUNCTURE: CPT | Performed by: EMERGENCY MEDICINE

## 2022-01-02 PROCEDURE — 36415 COLL VENOUS BLD VENIPUNCTURE: CPT | Performed by: HOSPITALIST

## 2022-01-02 PROCEDURE — 99223 1ST HOSP IP/OBS HIGH 75: CPT | Mod: AI | Performed by: HOSPITALIST

## 2022-01-02 PROCEDURE — 99285 EMERGENCY DEPT VISIT HI MDM: CPT | Mod: 25

## 2022-01-02 PROCEDURE — 120N000001 HC R&B MED SURG/OB

## 2022-01-02 PROCEDURE — 99207 PR NO BILLABLE SERVICE THIS VISIT: CPT | Performed by: INTERNAL MEDICINE

## 2022-01-02 PROCEDURE — 80053 COMPREHEN METABOLIC PANEL: CPT | Performed by: EMERGENCY MEDICINE

## 2022-01-02 PROCEDURE — 74177 CT ABD & PELVIS W/CONTRAST: CPT

## 2022-01-02 PROCEDURE — 250N000011 HC RX IP 250 OP 636: Performed by: HOSPITALIST

## 2022-01-02 RX ORDER — PANTOPRAZOLE SODIUM 20 MG/1
40 TABLET, DELAYED RELEASE ORAL
Status: DISCONTINUED | OUTPATIENT
Start: 2022-01-02 | End: 2022-01-04 | Stop reason: HOSPADM

## 2022-01-02 RX ORDER — PIPERACILLIN SODIUM, TAZOBACTAM SODIUM 3; .375 G/15ML; G/15ML
3.38 INJECTION, POWDER, LYOPHILIZED, FOR SOLUTION INTRAVENOUS ONCE
Status: COMPLETED | OUTPATIENT
Start: 2022-01-02 | End: 2022-01-02

## 2022-01-02 RX ORDER — NALOXONE HYDROCHLORIDE 0.4 MG/ML
0.4 INJECTION, SOLUTION INTRAMUSCULAR; INTRAVENOUS; SUBCUTANEOUS
Status: DISCONTINUED | OUTPATIENT
Start: 2022-01-02 | End: 2022-01-04 | Stop reason: HOSPADM

## 2022-01-02 RX ORDER — FLUTICASONE PROPIONATE 50 MCG
1 SPRAY, SUSPENSION (ML) NASAL DAILY
Status: DISCONTINUED | OUTPATIENT
Start: 2022-01-02 | End: 2022-01-04 | Stop reason: HOSPADM

## 2022-01-02 RX ORDER — ONDANSETRON 2 MG/ML
4 INJECTION INTRAMUSCULAR; INTRAVENOUS EVERY 6 HOURS PRN
Status: DISCONTINUED | OUTPATIENT
Start: 2022-01-02 | End: 2022-01-04 | Stop reason: HOSPADM

## 2022-01-02 RX ORDER — CARBOXYMETHYLCELLULOSE SODIUM 10 MG/ML
1 GEL OPHTHALMIC 3 TIMES DAILY PRN
Status: DISCONTINUED | OUTPATIENT
Start: 2022-01-02 | End: 2022-01-04 | Stop reason: HOSPADM

## 2022-01-02 RX ORDER — ONDANSETRON 4 MG/1
4 TABLET, ORALLY DISINTEGRATING ORAL EVERY 6 HOURS PRN
Status: DISCONTINUED | OUTPATIENT
Start: 2022-01-02 | End: 2022-01-04 | Stop reason: HOSPADM

## 2022-01-02 RX ORDER — MORPHINE SULFATE 2 MG/ML
1 INJECTION, SOLUTION INTRAMUSCULAR; INTRAVENOUS
Status: DISCONTINUED | OUTPATIENT
Start: 2022-01-02 | End: 2022-01-04 | Stop reason: HOSPADM

## 2022-01-02 RX ORDER — LANSOPRAZOLE 30 MG/1
30 CAPSULE, DELAYED RELEASE ORAL
Status: DISCONTINUED | OUTPATIENT
Start: 2022-01-03 | End: 2022-01-02 | Stop reason: CLARIF

## 2022-01-02 RX ORDER — VITAMIN B COMPLEX
2000 TABLET ORAL DAILY
Status: DISCONTINUED | OUTPATIENT
Start: 2022-01-02 | End: 2022-01-04 | Stop reason: HOSPADM

## 2022-01-02 RX ORDER — GABAPENTIN 100 MG/1
100 CAPSULE ORAL AT BEDTIME
Status: DISCONTINUED | OUTPATIENT
Start: 2022-01-02 | End: 2022-01-04 | Stop reason: HOSPADM

## 2022-01-02 RX ORDER — NALOXONE HYDROCHLORIDE 0.4 MG/ML
0.2 INJECTION, SOLUTION INTRAMUSCULAR; INTRAVENOUS; SUBCUTANEOUS
Status: DISCONTINUED | OUTPATIENT
Start: 2022-01-02 | End: 2022-01-04 | Stop reason: HOSPADM

## 2022-01-02 RX ORDER — PIPERACILLIN SODIUM, TAZOBACTAM SODIUM 3; .375 G/15ML; G/15ML
3.38 INJECTION, POWDER, LYOPHILIZED, FOR SOLUTION INTRAVENOUS EVERY 8 HOURS
Status: DISCONTINUED | OUTPATIENT
Start: 2022-01-02 | End: 2022-01-04 | Stop reason: HOSPADM

## 2022-01-02 RX ORDER — ONDANSETRON 2 MG/ML
4 INJECTION INTRAMUSCULAR; INTRAVENOUS EVERY 30 MIN PRN
Status: DISCONTINUED | OUTPATIENT
Start: 2022-01-02 | End: 2022-01-02

## 2022-01-02 RX ORDER — ALBUTEROL SULFATE 90 UG/1
1-2 AEROSOL, METERED RESPIRATORY (INHALATION) EVERY 4 HOURS PRN
Status: DISCONTINUED | OUTPATIENT
Start: 2022-01-02 | End: 2022-01-04 | Stop reason: HOSPADM

## 2022-01-02 RX ORDER — SODIUM CHLORIDE, SODIUM LACTATE, POTASSIUM CHLORIDE, CALCIUM CHLORIDE 600; 310; 30; 20 MG/100ML; MG/100ML; MG/100ML; MG/100ML
INJECTION, SOLUTION INTRAVENOUS CONTINUOUS
Status: DISCONTINUED | OUTPATIENT
Start: 2022-01-02 | End: 2022-01-02

## 2022-01-02 RX ORDER — PIPERACILLIN SODIUM, TAZOBACTAM SODIUM 3; .375 G/15ML; G/15ML
3.38 INJECTION, POWDER, LYOPHILIZED, FOR SOLUTION INTRAVENOUS EVERY 8 HOURS
Status: DISCONTINUED | OUTPATIENT
Start: 2022-01-02 | End: 2022-01-02 | Stop reason: CLARIF

## 2022-01-02 RX ORDER — LIDOCAINE 40 MG/G
CREAM TOPICAL
Status: DISCONTINUED | OUTPATIENT
Start: 2022-01-02 | End: 2022-01-04 | Stop reason: HOSPADM

## 2022-01-02 RX ORDER — SODIUM CHLORIDE 9 MG/ML
INJECTION, SOLUTION INTRAVENOUS CONTINUOUS
Status: DISCONTINUED | OUTPATIENT
Start: 2022-01-02 | End: 2022-01-03

## 2022-01-02 RX ORDER — IOPAMIDOL 755 MG/ML
100 INJECTION, SOLUTION INTRAVASCULAR ONCE
Status: COMPLETED | OUTPATIENT
Start: 2022-01-02 | End: 2022-01-02

## 2022-01-02 RX ADMIN — SODIUM CHLORIDE: 9 INJECTION, SOLUTION INTRAVENOUS at 08:59

## 2022-01-02 RX ADMIN — Medication 2000 UNITS: at 14:28

## 2022-01-02 RX ADMIN — IOPAMIDOL 100 ML: 755 INJECTION, SOLUTION INTRAVENOUS at 01:39

## 2022-01-02 RX ADMIN — FLUTICASONE FUROATE AND VILANTEROL TRIFENATATE 1 PUFF: 200; 25 POWDER RESPIRATORY (INHALATION) at 14:32

## 2022-01-02 RX ADMIN — PIPERACILLIN AND TAZOBACTAM 3.38 G: 3; .375 INJECTION, POWDER, LYOPHILIZED, FOR SOLUTION INTRAVENOUS at 09:00

## 2022-01-02 RX ADMIN — PANTOPRAZOLE SODIUM 40 MG: 20 TABLET, DELAYED RELEASE ORAL at 14:28

## 2022-01-02 RX ADMIN — ONDANSETRON 4 MG: 2 INJECTION INTRAMUSCULAR; INTRAVENOUS at 13:00

## 2022-01-02 RX ADMIN — SODIUM CHLORIDE: 9 INJECTION, SOLUTION INTRAVENOUS at 20:56

## 2022-01-02 RX ADMIN — ENOXAPARIN SODIUM 40 MG: 100 INJECTION SUBCUTANEOUS at 06:25

## 2022-01-02 RX ADMIN — GABAPENTIN 100 MG: 100 CAPSULE ORAL at 20:55

## 2022-01-02 RX ADMIN — DIATRIZOATE MEGLUMINE AND DIATRIZOATE SODIUM 75 ML: 660; 100 SOLUTION ORAL; RECTAL at 13:00

## 2022-01-02 RX ADMIN — SODIUM CHLORIDE 1000 ML: 9 INJECTION, SOLUTION INTRAVENOUS at 01:18

## 2022-01-02 RX ADMIN — FLUTICASONE PROPIONATE 1 SPRAY: 50 SPRAY, METERED NASAL at 14:32

## 2022-01-02 RX ADMIN — PIPERACILLIN AND TAZOBACTAM 3.38 G: 3; .375 INJECTION, POWDER, LYOPHILIZED, FOR SOLUTION INTRAVENOUS at 17:21

## 2022-01-02 RX ADMIN — SODIUM CHLORIDE, POTASSIUM CHLORIDE, SODIUM LACTATE AND CALCIUM CHLORIDE: 600; 310; 30; 20 INJECTION, SOLUTION INTRAVENOUS at 04:03

## 2022-01-02 RX ADMIN — UMECLIDINIUM 1 PUFF: 62.5 AEROSOL, POWDER ORAL at 14:32

## 2022-01-02 RX ADMIN — ONDANSETRON 4 MG: 2 INJECTION INTRAMUSCULAR; INTRAVENOUS at 01:18

## 2022-01-02 ASSESSMENT — ACTIVITIES OF DAILY LIVING (ADL)
ADLS_ACUITY_SCORE: 7
ADLS_ACUITY_SCORE: 7
WEAR_GLASSES_OR_BLIND: YES
ADLS_ACUITY_SCORE: 7
ADLS_ACUITY_SCORE: 7
PATIENT_/_FAMILY_COMMUNICATION_STYLE: SPOKEN LANGUAGE (ENGLISH OR BILINGUAL)
ADLS_ACUITY_SCORE: 7
DOING_ERRANDS_INDEPENDENTLY_DIFFICULTY: YES
ADLS_ACUITY_SCORE: 7
ADLS_ACUITY_SCORE: 7
WALKING_OR_CLIMBING_STAIRS: STAIR CLIMBING DIFFICULTY, ASSISTANCE 1 PERSON;AMBULATION DIFFICULTY, REQUIRES EQUIPMENT
CONCENTRATING,_REMEMBERING_OR_MAKING_DECISIONS_DIFFICULTY: YES
ADLS_ACUITY_SCORE: 7
WALKING_OR_CLIMBING_STAIRS_DIFFICULTY: YES
ADLS_ACUITY_SCORE: 7
DIFFICULTY_EATING/SWALLOWING: NO
DRESSING/BATHING_DIFFICULTY: NO
ADLS_ACUITY_SCORE: 7
ADLS_ACUITY_SCORE: 7
TOILETING_ISSUES: NO
ADLS_ACUITY_SCORE: 7
ADLS_ACUITY_SCORE: 4
ADLS_ACUITY_SCORE: 7
ADLS_ACUITY_SCORE: 7
FALL_HISTORY_WITHIN_LAST_SIX_MONTHS: NO
ADLS_ACUITY_SCORE: 7
DIFFICULTY_COMMUNICATING: NO
HEARING_DIFFICULTY_OR_DEAF: NO

## 2022-01-02 ASSESSMENT — ENCOUNTER SYMPTOMS
DIARRHEA: 1
ABDOMINAL PAIN: 1
BLOOD IN STOOL: 0
DYSURIA: 0
FEVER: 0
HEMATURIA: 0
NAUSEA: 1
VOMITING: 1
SHORTNESS OF BREATH: 1
FREQUENCY: 0

## 2022-01-02 ASSESSMENT — MIFFLIN-ST. JEOR
SCORE: 1352.65
SCORE: 1331.34

## 2022-01-02 NOTE — PHARMACY-ADMISSION MEDICATION HISTORY
Pharmacy Note - Admission Medication History    Pertinent Provider Information:     Although I was not present for the interview, nor did I personally see the patient, to my knowledge the intern has compiled the PTA medication list to the best of their ability given the information available at the time.    Moshe Ash, [facility]       ______________________________________________________________________    Prior To Admission (PTA) med list completed and updated in EMR.       PTA Med List   Medication Sig Last Dose     albuterol (PROVENTIL HFA;VENTOLIN HFA) 90 mcg/actuation inhaler [ALBUTEROL (PROVENTIL HFA;VENTOLIN HFA) 90 MCG/ACTUATION INHALER] Inhale 1-2 puffs every 4 (four) hours as needed for wheezing or shortness of breath. 1/1/2022 at AM     carboxymethylcellulose PF (REFRESH LIQUIGEL) 1 % ophthalmic gel Place 1 drop into both eyes 3 times daily as needed for dry eyes Past Week at Unknown time     cholecalciferol, vitamin D3, 2,000 unit Tab [CHOLECALCIFEROL, VITAMIN D3, 2,000 UNIT TAB] Take 2,000 Units by mouth once daily.  1/1/2022 at AM     fluticasone (FLONASE) 50 MCG/ACT nasal spray Spray 1 spray into both nostrils daily 1/1/2022 at AM     gabapentin (NEURONTIN) 100 MG capsule Take 100 mg by mouth At Bedtime  12/31/2021 at HS     lansoprazole (PREVACID) 30 MG capsule Take 30 mg by mouth every morning (before breakfast)  1/1/2022 at AM     mometasone-formoterol (DULERA) 200-5 mcg/actuation HFAA inhaler Inhale 2 puffs into the lungs 2 times daily  1/1/2022 at AM     polyethylene glycol (MIRALAX) 17 g packet Take 17 g by mouth At Bedtime 12/31/2021 at HS     umeclidinium (INCRUSE ELLIPTA) 62.5 MCG/INH inhaler Inhale 1 puff into the lungs daily 1/1/2022 at AM       Information source(s): Patient and CareEverywhere/SureScripts  Method of interview communication: in-person    Summary of Changes to PTA Med List  New: incruse ellipta  Discontinued: n/a  Changed: gabapentin only taking 100 mg HS confirmed with  pt    Patient was asked about OTC/herbal products specifically.  PTA med list reflects this.    In the past week, patient estimated taking medication this percent of the time:  greater than 90%.    Allergies were reviewed, assessed, and updated with the patient.      Patient did not bring any medications to the hospital and can't retrieve from home. No multi-dose medications are available for use during hospital stay.     The information provided in this note is only as accurate as the sources available at the time of the update(s).    Thank you for the opportunity to participate in the care of this patient.    Analy Castellanos  1/2/2022 8:53 AM

## 2022-01-02 NOTE — ED NOTES
Bed: [facility]-07  Expected date: 1/2/22  Expected time: 12:48 AM  Means of arrival: Ambulance  Comments:  Gui

## 2022-01-02 NOTE — ED TRIAGE NOTES
Arrives via ambulance. Pt reports 2 days ago she developed abdominal pain with nausea and vomiting. Hx of SBO and diverticulitis. Last BM prior to EMS arrival.

## 2022-01-02 NOTE — PLAN OF CARE
Problem: Adult Inpatient Plan of Care  Goal: Plan of Care Review  Outcome: Improving   Pt admitted to 3E at 0345. Pt is being treated conservatively for a SBO at this time. NPO, IVF. Plan for gen sx and GI to see her in the AM. Pt is able to ambulate with SBA. Denies pain or nausea when she arrived on the floor. Last bm was diarrhea in the ED after her CT. Will continue to monitor.

## 2022-01-02 NOTE — ED PROVIDER NOTES
EMERGENCY DEPARTMENT ENCOUNTER      NAME: Blessing Castellanos  AGE: 81 year old female  YOB: 1940  MRN: [id number]  EVALUATION DATE & TIME: 1/2/2022 12:55 AM    PCP: Myra Stein    ED PROVIDER: Naren Lay M.D.      Chief Complaint   Patient presents with     Abdominal Pain     Nausea & Vomiting         FINAL IMPRESSION:  1. SBO (small bowel obstruction) (H)    2. Colitis          ED COURSE & MEDICAL DECISION MAKING:    Pertinent Labs & Imaging studies reviewed. (See chart for details)  81 year old female presents to the Emergency Department for evaluation of abdominal pain.  Has a history of small bowel obstruction and diverticulitis.  Did get a CT scan.  This does show partial small bowel addition to some colitis.  There may be a fistula.  Patient's white count is 19.  Labs otherwise unremarkable.  Lactic acid is normal.  Given the small bowel obstruction patient will be admitted.  Discussed with Dr. Grove.  Patient accepted for admission.  Patient's pain is under control she does not anything for pain at this time.    1:03 AM I met with the patient to gather history and to perform my initial exam. I discussed the plan for care while in the Emergency Department. PPE: gloves, eye protection and surgical mask.  2:46 AM I spoke to Dr. Grove, hospitalist, regarding patient.    At the conclusion of the encounter I discussed the results of all of the tests and the disposition. The questions were answered. The patient or family acknowledged understanding and was agreeable with the care plan.       MEDICATIONS GIVEN IN THE EMERGENCY:  Medications   0.9% sodium chloride BOLUS (0 mLs Intravenous ED Infusing on Admission/transfer 1/2/22 8067)     Followed by   sodium chloride 0.9% infusion ( Intravenous Not Given 1/2/22 4387)   lidocaine 1 % 0.1-1 mL (has no administration in time range)   lidocaine (LMX4) cream (has no administration in time range)   sodium chloride (PF) 0.9% PF flush 3 mL (3 mLs  Intracatheter Not Given 1/2/22 6196)   sodium chloride (PF) 0.9% PF flush 3 mL (has no administration in time range)   melatonin tablet 1 mg (has no administration in time range)   enoxaparin ANTICOAGULANT (LOVENOX) injection 40 mg (has no administration in time range)   lactated ringers infusion ( Intravenous New Bag 1/2/22 3836)   ondansetron (ZOFRAN-ODT) ODT tab 4 mg (has no administration in time range)     Or   ondansetron (ZOFRAN) injection 4 mg (has no administration in time range)   morphine (PF) injection 1 mg (has no administration in time range)   naloxone (NARCAN) injection 0.2 mg (has no administration in time range)     Or   naloxone (NARCAN) injection 0.4 mg (has no administration in time range)     Or   naloxone (NARCAN) injection 0.2 mg (has no administration in time range)     Or   naloxone (NARCAN) injection 0.4 mg (has no administration in time range)   iopamidol (ISOVUE-370) solution 100 mL (100 mLs Intravenous Given 1/2/22 0815)       NEW PRESCRIPTIONS STARTED AT TODAY'S ER VISIT  Current Discharge Medication List             =================================================================    HPI    Patient information was obtained from: Patient    Use of : N/A         Blessing Castellanos is a 81 year old female with a pertinent history of hypercholesterolemia, COPD, GERD, diverticulitis, small bowel obstruction, who presents to this ED via EMS for evaluation of abdominal pain, nausea, and vomiting.    Patient reports that yesterday afternoon, she developed some abdominal cramping, then some nausea, vomiting, and diarrhea. She reports history of small bowel obstruction and diverticulitis. Otherwise, she notes some chronic shortness of breath secondary to COPD. Denies any blood in stool, fever, chest pain, and urinary symptoms. Patient reports that 22 years ago at age of 59, she had her appendix and gall bladder removed. She denies any allergies to medications. No other complaints  at this time.    REVIEW OF SYSTEMS   Review of Systems   Constitutional: Negative for fever.   Respiratory: Positive for shortness of breath (chronic).    Cardiovascular: Negative for chest pain.   Gastrointestinal: Positive for abdominal pain (cramping), diarrhea, nausea and vomiting. Negative for blood in stool.   Genitourinary: Negative for dysuria, frequency, hematuria and urgency.   All other systems reviewed and are negative.     PAST MEDICAL HISTORY:  Past Medical History:   Diagnosis Date     Bell's palsy      COPD (chronic obstructive pulmonary disease) (H)      GERD (gastroesophageal reflux disease)      Hypercholesteremia      Nephrolithiasis      Osteoarthritis      PONV (postoperative nausea and vomiting)      Raynaud's disease      Restless leg      Spinal stenosis        PAST SURGICAL HISTORY:  Past Surgical History:   Procedure Laterality Date     APPENDECTOMY       BACK SURGERY       CATARACT EXTRACTION       CHOLECYSTECTOMY       CYSTOCELE REPAIR       HYSTERECTOMY       JOINT REPLACEMENT Bilateral     shoulder     OTHER SURGICAL HISTORY      kidney stones     TONSILLECTOMY       TOTAL HIP ARTHROPLASTY Left 5/28/2015    Procedure: LEFT HIP TOTAL ARTHROPLASTY;  Surgeon: Chay Moran MD;  Location: [facility];  Service:      TUBAL LIGATION       VEIN LIGATION AND STRIPPING             CURRENT MEDICATIONS:    Current Facility-Administered Medications   Medication     enoxaparin ANTICOAGULANT (LOVENOX) injection 40 mg     lactated ringers infusion     lidocaine (LMX4) cream     lidocaine 1 % 0.1-1 mL     melatonin tablet 1 mg     morphine (PF) injection 1 mg     naloxone (NARCAN) injection 0.2 mg    Or     naloxone (NARCAN) injection 0.4 mg    Or     naloxone (NARCAN) injection 0.2 mg    Or     naloxone (NARCAN) injection 0.4 mg     ondansetron (ZOFRAN-ODT) ODT tab 4 mg    Or     ondansetron (ZOFRAN) injection 4 mg     sodium chloride (PF) 0.9% PF flush 3 mL     sodium chloride (PF) 0.9% PF  "flush 3 mL     sodium chloride 0.9% infusion         ALLERGIES:  Allergies   Allergen Reactions     Doxycycline Calcium [Doxycycline] Hives     Mirapex [Pramipexole] Hives     Prilosec [Omeprazole] Hives     Sulfa (Sulfonamide Antibiotics) [Sulfa Drugs] Other (See Comments)     GI Upset     Zithromax [Azithromycin] Unknown     Listed on H&P     Antihistamines - Alkylamine [Alkylamines] Anxiety       FAMILY HISTORY:  Family History   Problem Relation Age of Onset     Heart Disease Maternal Grandfather        SOCIAL HISTORY:   Social History     Socioeconomic History     Marital status:      Spouse name: Not on file     Number of children: Not on file     Years of education: Not on file     Highest education level: Not on file   Occupational History     Not on file   Tobacco Use     Smoking status: Former Smoker     Quit date: 1991     Years since quittin.6     Smokeless tobacco: Never Used   Substance and Sexual Activity     Alcohol use: No     Comment: Alcoholic Drinks/day: rare     Drug use: No     Sexual activity: Not on file   Other Topics Concern     Not on file   Social History Narrative     Not on file     Social Determinants of Health     Financial Resource Strain: Not on file   Food Insecurity: Not on file   Transportation Needs: Not on file   Physical Activity: Not on file   Stress: Not on file   Social Connections: Not on file   Intimate Partner Violence: Not on file   Housing Stability: Not on file       VITALS:  /64 (BP Location: Right arm, Patient Position: Supine)   Pulse 82   Temp 98.2  F (36.8  C) (Oral)   Resp 18   Ht 1.727 m (5' 8\")   Wt 83.9 kg (185 lb)   SpO2 94%   BMI 28.13 kg/m      PHYSICAL EXAM    Physical Exam  Constitutional:       General: She is not in acute distress.     Appearance: She is not diaphoretic.   HENT:      Head: Atraumatic.      Mouth/Throat:      Pharynx: No oropharyngeal exudate.   Eyes:      General: No scleral icterus.     Pupils: Pupils are " equal, round, and reactive to light.   Cardiovascular:      Heart sounds: Normal heart sounds.   Pulmonary:      Effort: No respiratory distress.      Breath sounds: Normal breath sounds.   Abdominal:      Palpations: Abdomen is soft.      Tenderness: There is generalized abdominal tenderness. There is no guarding or rebound.   Musculoskeletal:         General: No tenderness.   Skin:     General: Skin is warm.      Findings: No rash.   Neurological:      General: No focal deficit present.      Mental Status: She is alert.           LAB:  All pertinent labs reviewed and interpreted.  Labs Ordered and Resulted from Time of ED Arrival to Time of ED Departure   COMPREHENSIVE METABOLIC PANEL - Abnormal       Result Value    Sodium 138      Potassium 3.9      Chloride 99      Carbon Dioxide (CO2) 25      Anion Gap 14      Urea Nitrogen 19      Creatinine 0.96      Calcium 10.3      Glucose 172 (*)     Alkaline Phosphatase 91      AST 24      ALT 14      Protein Total 8.2 (*)     Albumin 3.8      Bilirubin Total 0.8      GFR Estimate 59 (*)    CBC WITH PLATELETS AND DIFFERENTIAL - Abnormal    WBC Count 19.4 (*)     RBC Count 5.50 (*)     Hemoglobin 15.8 (*)     Hematocrit 49.1 (*)     MCV 89      MCH 28.7      MCHC 32.2      RDW 13.2      Platelet Count 454 (*)     % Neutrophils 91      % Lymphocytes 4      % Monocytes 5      % Eosinophils 0      % Basophils 0      % Immature Granulocytes 0      NRBCs per 100 WBC 0      Absolute Neutrophils 17.4 (*)     Absolute Lymphocytes 0.8      Absolute Monocytes 1.0      Absolute Eosinophils 0.0      Absolute Basophils 0.1      Absolute Immature Granulocytes 0.1      Absolute NRBCs 0.0     LIPASE - Normal    Lipase 10     LACTIC ACID WHOLE BLOOD - Normal    Lactic Acid 1.2         RADIOLOGY:  Reviewed all pertinent imaging. Please see official radiology report.  CT Abdomen Pelvis w Contrast   Final Result   IMPRESSION:    1.  Low-grade small bowel obstruction transitioning in a  thickened segment of distal ileum. Underlying enteritis not excluded.   2.  Persistent wall thickening of the sigmoid colon. Findings could be due to chronic colitis/diverticulitis, chronic stricture versus neoplasm. Colonoscopy follow-up recommended if not recently performed. The possibility of fistula between the thickened    segment of sigmoid colon and the adjacent thickened segment of distal ileum not excluded.   3.  Small amount of free fluid.                I, Nohemi Noe, am serving as a scribe to document services personally performed by Dr. Naren Lay, based on my observation and the provider's statements to me. I, Naren Lay MD attest that Nohemi Noe is acting in a scribe capacity, has observed my performance of the services and has documented them in accordance with my direction.    Naren Lay M.D.  Emergency Medicine  [facility] EMERGENCY ROOM  [address] [id number] [phone]  Dept: [phone]     Naren Lay MD  01/02/22 0548

## 2022-01-02 NOTE — H&P
"Hospital Medicine Service History and Physical  [facility]: [facility]    Blessing Castellanos is a 81 year old female who  has a past medical history of Bell's palsy, COPD (chronic obstructive pulmonary disease) (H), GERD (gastroesophageal reflux disease), Hypercholesteremia, Nephrolithiasis, Osteoarthritis, PONV (postoperative nausea and vomiting), Raynaud's disease, Restless leg, and Spinal stenosis.   Chief Complaint: abd pain, N/V    Assessment and Plan  SBO & colitis  NPO, MIVF  Prn zofran & morphine  General surgery consult  Given possible fistula, GI consult  May need transfer for colo-rectal surgery depending on specialist recs  Start zosyn    Dehydration  Expect this explains her hemoconcentration on CBC, though appears to have true leukocytosis as well  Will give IVF as above, monitor CBC    DVTP: lovenox  Code Status: Prior  Disposition: Inpatient   Estimated body mass index is 28.13 kg/m  as calculated from the following:    Height as of this encounter: 1.727 m (5' 8\").    Weight as of this encounter: 83.9 kg (185 lb).    History of Present Illness  Blessing Castellanos was at  for colitis & SBO in September. She returns with diarrhea, then N/V and abd pain that started  Yesterday afternoon. Sounds like diarrhea preceded the N/V. She has no pain now with me. Nausea improved with zofran. She denies CP or SOB that are new. Prior abd surg include appendectomy and cholecystectomy. Denies melena or hematochezia.   In the ED she's afebrile and HDS. WBC 19k, plt 454, Hb 15.8, CT abd shows low-grade SBO with thickened distal ileum with possible fistula between sigmoid colon, also small free air.  All other systems reviewed and are negative    Appears tired  Anicteric conjunctiva, PERRL  Semi-dry mucous membranes, poor dentition  Trachea midline, no jvd  cta, Respiratory effort normal on RA  rrr, no murmur  Abdomen soft, nondistended, nontender  no edema, clubbing absent  Skin normal " temperature, dry  normal affect, alert  Vital signs reviewed by me    Wt Readings from Last 4 Encounters:   01/02/22 83.9 kg (185 lb)   09/16/21 82.9 kg (182 lb 12.8 oz)   05/28/15 94.3 kg (208 lb)        reports that she quit smoking about 30 years ago. She has never used smokeless tobacco. She reports that she does not drink alcohol and does not use drugs.  family history includes Heart Disease in her maternal grandfather.   has a past surgical history that includes back surgery; joint replacement (Bilateral); Cholecystectomy; appendectomy; Hysterectomy; other surgical history; Cataract Extraction; Tonsillectomy; tubal ligation; Vein Ligation And Stripping; Cystocele Repair; and Total Hip Arthroplasty (Left, 5/28/2015).   Allergies   Allergen Reactions     Doxycycline Calcium [Doxycycline] Hives     Mirapex [Pramipexole] Hives     Prilosec [Omeprazole] Hives     Sulfa (Sulfonamide Antibiotics) [Sulfa Drugs] Other (See Comments)     GI Upset     Zithromax [Azithromycin] Unknown     Listed on H&P     Antihistamines - Alkylamine [Alkylamines] Anxiety       Abe Grove MD, MPH  [facility]   Phone: #[phone]

## 2022-01-02 NOTE — PROGRESS NOTES
"[facility] Medicine PROGRESS NOTE      Identification/Summary:   Blessing Castellanos is a 81 year old female who  has a past medical history of Bell's palsy, COPD GERD Hypercholesteremia, Nephrolithiasis, Osteoarthritis, PONV Raynaud's disease, Restless leg, and Spinal stenosis presented with abdominal pain nausea vomiting and found to have sigmoid colon wall thickening, question of fistula, SBO on CT scan.  She is having ongoing diarrhea.  She has leukocytosis up to 19 concerning for  Infection.  On Zosyn.  Last colonoscopy 2 years ago.     Assessment and Plan  SBO & colitis  NPO, MIVF  General surgery consult  Given possible fistula, GI consult  On zosyn  Check stool studies  Question of fistula on CT scan    Dehydration  IV fluids     History of COPD  No sign of exacerbation  Continue home inhalers  History of restless legs  History of GERD  Continue home PPI    Diet: No diet orders on file  DVT Prophylaxis:   On Lovenox  Code Status: No CPR- Do NOT Intubate    Anticipated possible discharge in 1-2 days to once clinical improvement milestones are met.    Interval History/Subjective:  She has ongoing diarrhea.  No blood in stools or black stools.  Denies any abdominal pain.  Some cramping.  No others worsening shortness of breath, chest pain, urinary complaints.  No cough.    Physical Exam/Objective:  Vitals I/O   Vital signs:  Temp: 98.2  F (36.8  C) Temp src: Oral BP: 138/64 Pulse: 82   Resp: 18 SpO2: 94 % O2 Device: Nasal cannula Oxygen Delivery: 2 LPM Height: 172.7 cm (5' 8\") Weight: 81.8 kg (180 lb 4.8 oz)  Estimated body mass index is 27.41 kg/m  as calculated from the following:    Height as of this encounter: 1.727 m (5' 8\").    Weight as of this encounter: 81.8 kg (180 lb 4.8 oz).       I/O last 3 completed shifts:  In: -   Out: 300 [Urine:300]     Body mass index is 27.41 kg/m .    General Appearance:  Alert, cooperative, no distress   Head:  Normocephalic, atraumatic   Eyes:  PERRL    Throat:  " mucosa; moist   Neck: No JVD, thyromegaly   Lungs:    Decreased breath sounds bilaterally, respirations unlabored   Chest Wall:  No tenderness or deformity   Heart:  Regular rate and rhythm, S1, S2 normal,no murmur   Abdomen:   Soft, mildly tender in right side of the abdomen, non distended, bowel sounds present, no guarding or rigidity   Extremities: No edema, no joint swelling   Skin: Skin color, texture, turgor normal, no rashes or lesions   Neurologic: Alert and oriented X 3, Moves all 4 extremities       Medications:   Personally Reviewed.    diatrizoate meglumine-sodium  75 mL Oral Once     enoxaparin ANTICOAGULANT  40 mg Subcutaneous Q24H     piperacillin-tazobactam  3.375 g Intravenous Q8H     sodium chloride (PF)  3 mL Intracatheter Q8H       Data reviewed today: I personally reviewed all new medications, labs, imaging/diagnostics reports over the past 24 hours. Pertinent findings include    Labs:  Most Recent 3 CBC's:Recent Labs   Lab Test 01/02/22  1043 01/02/22  0116 09/16/21  0631   WBC 11.3* 19.4* 9.8   HGB 12.8 15.8* 13.5   MCV 91 89 93    454* 301     Most Recent 3 BMP's:Recent Labs   Lab Test 01/02/22  0452 01/02/22  0116 09/16/21  0631 08/02/20  0522   NA  --  138 140 139   POTASSIUM  --  3.9 4.3 3.6   CHLORIDE  --  99 108* 107   CO2  --  25 28 24   BUN  --  19 15 12   CR 0.78 0.96 0.87 0.68   ANIONGAP  --  14 4* 8   HANG  --  10.3 8.2* 7.9*   GLC  --  172* 104 98     Most Recent 2 LFT's:Recent Labs   Lab Test 01/02/22  0116 07/30/20  1602   AST 24 19   ALT 14 11   ALKPHOS 91 99   BILITOTAL 0.8 0.5       Imaging:   Recent Results (from the past 24 hour(s))   CT Abdomen Pelvis w Contrast    Narrative    EXAM: CT ABDOMEN PELVIS W CONTRAST  LOCATION: [facility]  DATE/TIME: 1/2/2022 1:34 AM    INDICATION: Abdominal pain, acute, nonlocalized  COMPARISON: 10/15/2021  TECHNIQUE: CT scan of the abdomen and pelvis was performed following injection of IV contrast. Multiplanar  reformats were obtained. Dose reduction techniques were used.  CONTRAST: 100 mL Isovue-370    FINDINGS:   LOWER CHEST: Mild basilar fibroatelectasis. Small hiatal hernia.    HEPATOBILIARY: Normal.    PANCREAS: Normal.    SPLEEN: Normal.    ADRENAL GLANDS: Normal.    KIDNEYS/BLADDER: Subcentimeter renal hypodensities small for characterization, likely tiny cysts.    BOWEL: Fluid-filled, mildly distended small bowel transitioning in a thickened segment of distal ileum within the lower pelvis series 3 images 170-181. Diverticulosis. Persistent thickening of sigmoid colon. Slight adjacent inflammation and trace amount   of fluid. The possibility of fistula between the sigmoid colon and adjacent small bowel not excluded series 3 image 170-172.    LYMPH NODES: Normal.    VASCULATURE: Diffuse, atherosclerotic vascular calcification.    PELVIC ORGANS: Absent uterus.    MUSCULOSKELETAL: Hip arthroplasty. Degenerative change osseous structures. Postsurgical change lumbar spine. Mild anterolisthesis L4 on L5.      Impression    IMPRESSION:   1.  Low-grade small bowel obstruction transitioning in a thickened segment of distal ileum. Underlying enteritis not excluded.  2.  Persistent wall thickening of the sigmoid colon. Findings could be due to chronic colitis/diverticulitis, chronic stricture versus neoplasm. Colonoscopy follow-up recommended if not recently performed. The possibility of fistula between the thickened   segment of sigmoid colon and the adjacent thickened segment of distal ileum not excluded.  3.  Small amount of free fluid.       Yari Holman MD  Hospitalist  [facility]

## 2022-01-02 NOTE — CONSULTS
GASTROENTEROLOGY CONSULTATION     Blessing Castellanos   [address] [place] [address] [address] [place] [id number]   81 year old female   Admission Date/Time: 1/2/2022   Encounter Date: 1/2/2022  Primary Care Provider: Myra Stein     Referring / Attending Physician: Dr. Holman   We were asked to see the patient in consultation by Dr. Holman for evaluation of sigmoid thickening and SBO.     HPI: Blessing Castellanos is an 81 year old female who presents to the hospital for recurrent symptoms of nausea, vomiting, abdomina pain and abnormal CT imaging with concerns for bowel obstruction and bowel wall thickening. WBC 19.4 and Zosyn has been started. She is NPO,  N/V has resolved. Stool studies are pending.      Her medical history of COPD, RLS, spinal stenosis, Raynaud's, prior cholecystectomy and appendectomy.  Last colonoscopy 10 to 15 years ago with previous history of colon polyps reported.     She reports onset of abdominal cramping afternoon of 12/31. Yesterday morning she began having nausea, vomiting and diarrhea with worsening abdominal pain prompting ER evaluation and hospital admission. She was again have findings on CT scan 1/2/2022 of fluid filled mildly distended small bowel transitioning in a thickened segment of the distal ileum in the lower pelvis. Persistent thickening of the sigmoid colon and ? Of a fistula between the sigmoid colon and small bowel.     She had acute diverticulitis 6/2020 treated, presented 7/2020 with CT findings of  colon thickening with possible fistula and SBO.   She presented again 9/2021 with CT imaging again showing a moderate segment of sigmoid bowel wall thickening extending and adjacent to the small bowel. ? IBD versus chronic diverticulitis versus neoplasm, reports of 10 lb weight loss, normal WBC and CEA.  Colonoscopy has been recommended but not completed. Her  passed away from cancer 12/21/21. She has 6 children and tells me that she will not be able to schedule  colonoscopy since she does not have a ride. She reports that she gained back the weight she had lost.     She carries a history of chronic constipation and has been taking miralax one capful daily stimulating daily soft stools that are brown in color.     There is no family history of GI cancers or IBD.  Her daughter has had diverticulitis.         Past Medical History  Past Medical History:   Diagnosis Date     Bell's palsy      COPD (chronic obstructive pulmonary disease) (H)      GERD (gastroesophageal reflux disease)      Hypercholesteremia      Nephrolithiasis      Osteoarthritis      PONV (postoperative nausea and vomiting)      Raynaud's disease      Restless leg      Spinal stenosis        Past Surgical History  Past Surgical History:   Procedure Laterality Date     APPENDECTOMY       BACK SURGERY       CATARACT EXTRACTION       CHOLECYSTECTOMY       CYSTOCELE REPAIR       HYSTERECTOMY       JOINT REPLACEMENT Bilateral     shoulder     OTHER SURGICAL HISTORY      kidney stones     TONSILLECTOMY       TOTAL HIP ARTHROPLASTY Left 2015    Procedure: LEFT HIP TOTAL ARTHROPLASTY;  Surgeon: Chay Moran MD;  Location: [facility];  Service:      TUBAL LIGATION       VEIN LIGATION AND STRIPPING         Family History  Family History   Problem Relation Age of Onset     Heart Disease Maternal Grandfather        Social History  Social History     Socioeconomic History     Marital status:      Spouse name: Not on file     Number of children: Not on file     Years of education: Not on file     Highest education level: Not on file   Occupational History     Not on file   Tobacco Use     Smoking status: Former Smoker     Quit date: 1991     Years since quittin.6     Smokeless tobacco: Never Used   Substance and Sexual Activity     Alcohol use: No     Comment: Alcoholic Drinks/day: rare     Drug use: No     Sexual activity: Not on file   Other Topics Concern     Not on file   Social History  Narrative     Not on file     Social Determinants of Health     Financial Resource Strain: Not on file   Food Insecurity: Not on file   Transportation Needs: Not on file   Physical Activity: Not on file   Stress: Not on file   Social Connections: Not on file   Intimate Partner Violence: Not on file   Housing Stability: Not on file       Medications  Prior to Admission medications    Medication Sig Start Date End Date Taking? Authorizing Provider   albuterol (PROVENTIL HFA;VENTOLIN HFA) 90 mcg/actuation inhaler [ALBUTEROL (PROVENTIL HFA;VENTOLIN HFA) 90 MCG/ACTUATION INHALER] Inhale 1-2 puffs every 4 (four) hours as needed for wheezing or shortness of breath. 5/28/15  Yes Provider, Historical   carboxymethylcellulose PF (REFRESH LIQUIGEL) 1 % ophthalmic gel Place 1 drop into both eyes 3 times daily as needed for dry eyes   Yes Unknown, Entered By History   cholecalciferol, vitamin D3, 2,000 unit Tab [CHOLECALCIFEROL, VITAMIN D3, 2,000 UNIT TAB] Take 2,000 Units by mouth once daily.  5/22/15  Yes Provider, Historical   fluticasone (FLONASE) 50 MCG/ACT nasal spray Spray 1 spray into both nostrils daily 9/17/21  Yes Mauro Tang MD   gabapentin (NEURONTIN) 100 MG capsule Take 100 mg by mouth At Bedtime  5/22/15  Yes Provider, Historical   lansoprazole (PREVACID) 30 MG capsule Take 30 mg by mouth every morning (before breakfast)  5/28/15  Yes Provider, Historical   mometasone-formoterol (DULERA) 200-5 mcg/actuation HFAA inhaler Inhale 2 puffs into the lungs 2 times daily  5/28/15  Yes Provider, Historical   polyethylene glycol (MIRALAX) 17 g packet Take 17 g by mouth At Bedtime   Yes Unknown, Entered By History   umeclidinium (INCRUSE ELLIPTA) 62.5 MCG/INH inhaler Inhale 1 puff into the lungs daily   Yes Unknown, Entered By History       Allergies:  Doxycycline calcium [doxycycline], Mirapex [pramipexole], Prilosec [omeprazole], Sulfa (sulfonamide antibiotics) [sulfa drugs], Zithromax [azithromycin], and  "Antihistamines - alkylamine [alkylamines]    ROS: A ten point review of systems was obtained and negative other than the symptoms noted above in the HPI.     Physical Exam:   /64 (BP Location: Right arm, Patient Position: Supine)   Pulse 82   Temp 98.2  F (36.8  C) (Oral)   Resp 18   Ht 1.727 m (5' 8\")   Wt 81.8 kg (180 lb 4.8 oz)   SpO2 94%   BMI 27.41 kg/m     Constitutional: healthy, alert, oriented x 3,  no acute distress  Cardiovascular: regular, rate and rhythm  Respiratory: respirations non labored.   Psychiatric: normal pleasant affect  Head: atraumatic, normocephalic  ENT: mucous membranes are moist  Abdomen: soft, left lower quadrant pain with palpation.   Neuro: Neurologically intact grossly  Skin: warm, dry, no rashes are noted    ADDITIONAL COMMENTS:   I reviewed the patient's new clinical lab test results.   Recent Labs   Lab Test 01/02/22  0116 09/16/21  0631 08/02/20  0522   WBC 19.4* 9.8 14.2*   HGB 15.8* 13.5 12.3   MCV 89 93 89   * 301 422      Recent Labs   Lab Test 01/02/22  0452 01/02/22  0116 09/16/21  0631 08/02/20  0522   NA  --  138 140 139   POTASSIUM  --  3.9 4.3 3.6   CHLORIDE  --  99 108* 107   CO2  --  25 28 24   BUN  --  19 15 12   CR 0.78 0.96 0.87 0.68   ANIONGAP  --  14 4* 8   HANG  --  10.3 8.2* 7.9*      Recent Labs   Lab Test 01/02/22  0116 07/30/20  1602   ALBUMIN 3.8 3.8   BILITOTAL 0.8 0.5   ALT 14 11   AST 24 19   ALKPHOS 91 99   LIPASE 10 10     CEA normal.   I reviewed the patient's new imaging results.     EXAM: CT ABDOMEN PELVIS W CONTRAST  LOCATION: [facility]  DATE/TIME: 1/2/2022 1:34 AM  IMPRESSION:   1.  Low-grade small bowel obstruction transitioning in a thickened segment of distal ileum. Underlying enteritis not excluded.  2.  Persistent wall thickening of the sigmoid colon. Findings could be due to chronic colitis/diverticulitis, chronic stricture versus neoplasm. Colonoscopy follow-up recommended if not recently " performed. The possibility of fistula between the thickened   segment of sigmoid colon and the adjacent thickened segment of distal ileum not excluded.  3.  Small amount of free fluid.    EXAM: CT ABDOMEN PELVIS W   LOCATION: [facility]   DATE/TIME: 9/15/2021 7:26 AM     INDICATION: Left lower quadrant abdominal pain.   COMPARISON: 9/14/2020.   TECHNIQUE: CT scan of the abdomen and pelvis was performed following injection of IV contrast. Repeat imaging was obtained due to moderate motion artifact on the initial acquisition. Multiplanar reformats were obtained. Dose reduction techniques were used.   CONTRAST: IOHEXOL 350 MG IODINE/ML  ML BOTTLE: 75mL   IMPRESSION:   1.  Abnormal exam with moderate segment of wall thickening and hyperenhancement of the sigmoid colon which extends to and involves an adjacent segment of small bowel with findings suggesting partial small bowel obstruction. The appearance is atypical for acute diverticulitis and this may be due to chronic inflammatory bowel disease such as ulcerative colitis with developing fistulous disease with the adjacent small bowel. Chronic diverticulitis could have a similar appearance although long segment of sigmoid colonic involvement would be atypical and colonoscopy will likely be needed for tissue biopsy and confirm this is benign wall thickening. Findings communicated to the emergency room physician by telephone 9/15/2021 at 8:30 AM.     2.  No abscess, free air, or additional complication.     EXAM: CT ABDOMEN PELVIS WO ORAL W IV CONTRAST  LOCATION: [facility]  DATE/TIME: 7/30/2020 4:52 PM   IMPRESSION:  New moderate fluid-filled distention of small bowel extending down to the pelvis where there is irregular wall thickening in a small bowel loop resulting from fistulization from the adjacent sigmoid colon. Findings compatible with mechanical   small bowel obstruction. Long segment, somewhat irregular, wall thickening in the sigmoid again  seen with surrounding soft tissue stranding. There are a few diverticula present, and diagnostic considerations include sigmoid diverticulitis versus a   Carcinoma.      Impression:   This is an 81 year old female with a history of  COPD, RLS, spinal stenosis, Raynaud's, prior cholecystectomy and appendectomy. She has a history of colon polyps with last colonoscopy 10 to 15 years ago and a history of acute diverticulitis 6/2020 with recurrent hospitalization for nausea, vomiting, diarrhea and abdominal pain with CT imaging consistent with sigmoid thickening, potentially a fistula formation to the small bowel and SBO. Her last presentation 9/2021 required NG decompression. She is currently doing well. WBC 19.4 and Zosyn has been started. She is NPO.     1. Sigmoid thickening with concerns of fistula to the small bowel and SBO- differentials would include acute on chronic diverticulitis ( WBC elevated) with fistula formation , neoplasm, IBD. Attempts have been made to schedule colonoscopy which has been complicated by her  having cancer ( passed away 12/21) and no one to drive her for the procedure. She is now doing better. Surgery has been consulted.     2. Diarrhea, likely due to SBO - differentials include infection . Will check stool studies.     3. Constipation - resume miralax when diarrhea has resolved.     Plan:  Colonoscopy in 6 weeks- she may have to take a medivan  Continue zosyn  Continue supportive care.   When diarrhea resolves then resume miralax.   Appreciate surgery involvement.       I will review plan with either Dr. Murphy or Dr. Bradley who will also see the patient.       Ashley Recinos CNP   [facility]   Office number       ADDENDUM  Patient seen and examined at bedside  Discussed patient with Inocente Recinos CNP  I agree with her assessment and plan    Blessing is an 81 year old female with a history of  COPD, RLS, spinal stenosis, Raynaud's, prior cholecystectomy  "and appendectomy. She has a history of colon polyps with last colonoscopy 10 to 15 years ago and a history of acute diverticulitis 6/2020  acute diverticulitis 6/2020 treated, presented 7/2020 with CT findings of  colon thickening with possible fistula and SBO.   She presented again 9/2021 with CT imaging again showing a moderate segment of sigmoid bowel wall thickening extending and adjacent to the small bowel. ? IBD versus chronic diverticulitis versus neoplasm, reports of 10 lb weight loss, normal WBC and CEA.  Colonoscopy has been recommended but not completed. Now presenting with abdominal pain, nausea, and vomiting, found on ct to have sigmoid thickening, potentially a fistula formation to the small bowel and SBO.    /64 (BP Location: Right arm, Patient Position: Supine)   Pulse 82   Temp 98.2  F (36.8  C) (Oral)   Resp 18   Ht 1.727 m (5' 8\")   Wt 81.8 kg (180 lb 4.8 oz)   SpO2 94%   BMI 27.41 kg/m     GEN: non-toxic appearing  HEENT: o/p clear, mmm, perrl  PULM: ctab  CARD: rrr  ABD: soft, nttp, nbs  NEURO: non-focal    Labs and imaging reviewed  1) Diverticulitis, seems focused in the sigmoid colon, complex with likely fistula formation. It seems like this is chronic as the same finding was present in July of 2020  -->Agree with broad spectrum antibiotics  -->Should have outpatient colonoscopy in ~ 2months  -->Should have evaluation from colorectal surgery for sigmoid resection    2) SBO  -Exam seems like obstruction has passed. Perhaps some stricturing at the ileum or ICV? It seems like the inflammation in the sigmoid colon with resultant inflammation due to proximity/fistulization to the small bowel, is the cause of the obstruction. Hope would be the resolution of diverticulitis in the short term will help with obstruction. And resection of sigmoid colon in the long term will help with recurrence.  -->SBFT pending later this evening. Suspect this will show contrast getting through. OK to " start clear liquids and advance diet after that    Will follow  Thank you  Patrick Murphy MD

## 2022-01-03 LAB
ANION GAP SERPL CALCULATED.3IONS-SCNC: 7 MMOL/L (ref 5–18)
BUN SERPL-MCNC: 12 MG/DL (ref 8–28)
CALCIUM SERPL-MCNC: 8.1 MG/DL (ref 8.5–10.5)
CHLORIDE BLD-SCNC: 111 MMOL/L (ref 98–107)
CO2 SERPL-SCNC: 25 MMOL/L (ref 22–31)
CREAT SERPL-MCNC: 0.72 MG/DL (ref 0.6–1.1)
ERYTHROCYTE [DISTWIDTH] IN BLOOD BY AUTOMATED COUNT: 13.3 % (ref 10–15)
GFR SERPL CREATININE-BSD FRML MDRD: 84 ML/MIN/1.73M2
GLUCOSE BLD-MCNC: 93 MG/DL (ref 70–125)
HCT VFR BLD AUTO: 39.2 % (ref 35–47)
HGB BLD-MCNC: 12.2 G/DL (ref 11.7–15.7)
MCH RBC QN AUTO: 28.8 PG (ref 26.5–33)
MCHC RBC AUTO-ENTMCNC: 31.1 G/DL (ref 31.5–36.5)
MCV RBC AUTO: 93 FL (ref 78–100)
PLATELET # BLD AUTO: 310 10E3/UL (ref 150–450)
POTASSIUM BLD-SCNC: 3.8 MMOL/L (ref 3.5–5)
RBC # BLD AUTO: 4.23 10E6/UL (ref 3.8–5.2)
SODIUM SERPL-SCNC: 143 MMOL/L (ref 136–145)
WBC # BLD AUTO: 10.2 10E3/UL (ref 4–11)

## 2022-01-03 PROCEDURE — 85027 COMPLETE CBC AUTOMATED: CPT | Performed by: INTERNAL MEDICINE

## 2022-01-03 PROCEDURE — 120N000001 HC R&B MED SURG/OB

## 2022-01-03 PROCEDURE — 99207 PR CDG-MDM COMPONENT: MEETS MODERATE - DOWN CODED: CPT | Performed by: INTERNAL MEDICINE

## 2022-01-03 PROCEDURE — 36415 COLL VENOUS BLD VENIPUNCTURE: CPT | Performed by: INTERNAL MEDICINE

## 2022-01-03 PROCEDURE — 250N000011 HC RX IP 250 OP 636: Performed by: HOSPITALIST

## 2022-01-03 PROCEDURE — 80048 BASIC METABOLIC PNL TOTAL CA: CPT | Performed by: INTERNAL MEDICINE

## 2022-01-03 PROCEDURE — 99222 1ST HOSP IP/OBS MODERATE 55: CPT | Performed by: PHYSICIAN ASSISTANT

## 2022-01-03 PROCEDURE — 250N000013 HC RX MED GY IP 250 OP 250 PS 637: Performed by: INTERNAL MEDICINE

## 2022-01-03 PROCEDURE — 99232 SBSQ HOSP IP/OBS MODERATE 35: CPT | Performed by: INTERNAL MEDICINE

## 2022-01-03 RX ADMIN — FLUTICASONE PROPIONATE 1 SPRAY: 50 SPRAY, METERED NASAL at 09:43

## 2022-01-03 RX ADMIN — PIPERACILLIN AND TAZOBACTAM 3.38 G: 3; .375 INJECTION, POWDER, LYOPHILIZED, FOR SOLUTION INTRAVENOUS at 20:55

## 2022-01-03 RX ADMIN — ENOXAPARIN SODIUM 40 MG: 100 INJECTION SUBCUTANEOUS at 06:32

## 2022-01-03 RX ADMIN — UMECLIDINIUM 1 PUFF: 62.5 AEROSOL, POWDER ORAL at 09:43

## 2022-01-03 RX ADMIN — PANTOPRAZOLE SODIUM 40 MG: 20 TABLET, DELAYED RELEASE ORAL at 06:31

## 2022-01-03 RX ADMIN — GABAPENTIN 100 MG: 100 CAPSULE ORAL at 20:54

## 2022-01-03 RX ADMIN — Medication 2000 UNITS: at 09:43

## 2022-01-03 RX ADMIN — PIPERACILLIN AND TAZOBACTAM 3.38 G: 3; .375 INJECTION, POWDER, LYOPHILIZED, FOR SOLUTION INTRAVENOUS at 02:01

## 2022-01-03 RX ADMIN — FLUTICASONE FUROATE AND VILANTEROL TRIFENATATE 1 PUFF: 200; 25 POWDER RESPIRATORY (INHALATION) at 09:43

## 2022-01-03 RX ADMIN — PIPERACILLIN AND TAZOBACTAM 3.38 G: 3; .375 INJECTION, POWDER, LYOPHILIZED, FOR SOLUTION INTRAVENOUS at 11:48

## 2022-01-03 ASSESSMENT — ACTIVITIES OF DAILY LIVING (ADL)
ADLS_ACUITY_SCORE: 7

## 2022-01-03 NOTE — PROGRESS NOTES
"GI Progress Note  Blessing Castellanos  [id number]     Subjective:   Sitting up in chair.  No complaints.  IV site failed.  Planning on getting new IV line placed.  Denies any significant abdominal pain; overall abdominal pain is improved.  Takes MiraLAX every day at home.  Tolerated full liquid diet well morning.    Objective:   /60 (BP Location: Right arm)   Pulse 74   Temp 98.7  F (37.1  C) (Oral)   Resp 18   Ht 1.727 m (5' 8\")   Wt 81.8 kg (180 lb 4.8 oz)   SpO2 94%   BMI 27.41 kg/m    Body mass index is 27.41 kg/m .   Gen: No acute distress  Cardio: RRR  GI: Non-distended, BS positive, soft, minimally-tender in left lower quadrant. No guarding.    Laboratory  Recent Labs   Lab 01/03/22  0601 01/02/22  1043 01/02/22  0116   WBC 10.2 11.3* 19.4*   RBC 4.23 4.45 5.50*   HGB 12.2 12.8 15.8*   HCT 39.2 40.4 49.1*   MCV 93 91 89   MCH 28.8 28.8 28.7   MCHC 31.1* 31.7 32.2   RDW 13.3 13.4 13.2    366 454*      Recent Labs   Lab 01/03/22  0601 01/02/22  0116    138   CO2 25 25   BUN 12 19     Recent Labs   Lab 01/02/22  0116   ALKPHOS 91   AST 24   ALT 14     No results found for: INR, TROPONIN, TROPI, TROPONIN, TROPR, TROPN    CT Abdomen Pelvis w Contrast    Result Date: 1/2/2022  EXAM: CT ABDOMEN PELVIS W CONTRAST LOCATION: [facility] DATE/TIME: 1/2/2022 1:34 AM INDICATION: Abdominal pain, acute, nonlocalized COMPARISON: 10/15/2021 TECHNIQUE: CT scan of the abdomen and pelvis was performed following injection of IV contrast. Multiplanar reformats were obtained. Dose reduction techniques were used. CONTRAST: 100 mL Isovue-370 FINDINGS: LOWER CHEST: Mild basilar fibroatelectasis. Small hiatal hernia. HEPATOBILIARY: Normal. PANCREAS: Normal. SPLEEN: Normal. ADRENAL GLANDS: Normal. KIDNEYS/BLADDER: Subcentimeter renal hypodensities small for characterization, likely tiny cysts. BOWEL: Fluid-filled, mildly distended small bowel transitioning in a thickened segment of " distal ileum within the lower pelvis series 3 images 170-181. Diverticulosis. Persistent thickening of sigmoid colon. Slight adjacent inflammation and trace amount of fluid. The possibility of fistula between the sigmoid colon and adjacent small bowel not excluded series 3 image 170-172. LYMPH NODES: Normal. VASCULATURE: Diffuse, atherosclerotic vascular calcification. PELVIC ORGANS: Absent uterus. MUSCULOSKELETAL: Hip arthroplasty. Degenerative change osseous structures. Postsurgical change lumbar spine. Mild anterolisthesis L4 on L5.     IMPRESSION: 1.  Low-grade small bowel obstruction transitioning in a thickened segment of distal ileum. Underlying enteritis not excluded. 2.  Persistent wall thickening of the sigmoid colon. Findings could be due to chronic colitis/diverticulitis, chronic stricture versus neoplasm. Colonoscopy follow-up recommended if not recently performed. The possibility of fistula between the thickened  segment of sigmoid colon and the adjacent thickened segment of distal ileum not excluded. 3.  Small amount of free fluid.    XR Gastrografin Challenge    Result Date: 1/2/2022  EXAM: XR GASTROGRAFIN CHALLENGE LOCATION: [facility] DATE/TIME: 1/2/2022 1:16 PM INDICATION: Gastrografin UGI Challenge COMPARISON: Earlier today TECHNIQUE: Routine water soluble contrast follow-through challenge. FINDINGS: FLUOROSCOPIC TIME: 0 minutes NUMBER OF FLUOROSCOPIC IMAGES: 2 Images taken 8 hours hours post Gastrografin challenge shows that contrast has passed into the entire colon. No abnormally dilated loops of bowel. Contrast also seen in the bladder due to recent IV contrast infusion. Extensive multilevel thoracolumbar spinal fusion apparatus. Prior left total hip arthroplasty.     IMPRESSION: Contrast freely enters the colon.      Assessment:   1.  Diverticulitis; complex.  Appears focused on the sigmoid colon with likely fistula formation.  Small bowel follow-through showed  contrast making it through to the colon when images were taken at 8 hours.  Treating with IV Zosyn.  Cannot exclude neoplasm or IBD.  Previously with diverticulitis June 2020.  In July 2020 CT showed colon thickening with possible fistula and SBO.  September 2021 CT again showed sigmoid bowel wall thickening extending to the adjacent small bowel.  -Colonoscopy previously recommended but she reports she lost her  12/21/2021 2 cancer.  She is concerned about not being able to find a ride for colonoscopy; has 6 children and reports that they are all quite busy.  -Surgery is seen the patient.  With clinical improvement they have signed off.  2.  Partial small bowel obstruction.  This is based on CT images.  This has resolved; Gastrografin made it through to the colon yesterday.  Tolerating full liquids.  History of constipation.  Uses MiraLAX every day at home.  -Diarrhea has improved.    3.  COPD.  On oxygen.  History of former tobacco use    Patient Active Problem List   Diagnosis     Status post left hip replacement     PONV (postoperative nausea and vomiting)     Raynaud's disease     COPD (chronic obstructive pulmonary disease) (H)     Restless leg     GERD (gastroesophageal reflux disease)     SBO (small bowel obstruction) (H)     Colitis     Acute recurrent sinusitis, unspecified location     Plan:   1.  Continue IV Zosyn.  Should be on IV antibiotics several days before discharging on oral antibiotics. Will update chart with duration of IV antibiotics.   2. Continue full liquid diet today.  Possible advancement to low-fiber diet tomorrow.   3.  Outpatient colonoscopy in approximately 2 months.  4. Will continue to follow.     Thank you.  Les Sousa PA-C  [facility]  [phone]

## 2022-01-03 NOTE — CONSULTS
General Surgery Consultation  Blessing Castellanos MRN# [id number]   Age/Sex: 81 year old female YOB: 1940     Reason for consult: 1. SBO (small bowel obstruction) (H)    2. Colitis            Requesting physician: Dr. Grove                   Assessment and Plan:   Assessment:  SBO that has resolved    Plan:  Reviewed exam, lab, and imagining findings with her. Gastrografin made it to colon and having multiple BMs, so no continued SBO. She is already tolerating clears. No surgical indication, we will sign off at this time.        Juan Kumar PA-C  Pager - [phone]  Phone - [phone]   General Surgery           Chief Complaint:     Chief Complaint   Patient presents with     Abdominal Pain     Nausea & Vomiting        History is obtained from the patient    HPI:   Blessing Castellanos is a 81 year old female who presents with diarrhea, n/v, and abdominal pain that started 2 days ago. She presented to the ED and CT was concerning for SBO. Gastrografin was given, this passed to colon and she has had multiple BMs since. SHe has been tolerating clear liquids. She has had multiple previous abdominal surgeries.           Past Medical History:     Past Medical History:   Diagnosis Date     Bell's palsy      COPD (chronic obstructive pulmonary disease) (H)      GERD (gastroesophageal reflux disease)      Hypercholesteremia      Nephrolithiasis      Osteoarthritis      PONV (postoperative nausea and vomiting)      Raynaud's disease      Restless leg      Spinal stenosis               Past Surgical History:     Past Surgical History:   Procedure Laterality Date     APPENDECTOMY       BACK SURGERY       CATARACT EXTRACTION       CHOLECYSTECTOMY       CYSTOCELE REPAIR       HYSTERECTOMY       JOINT REPLACEMENT Bilateral     shoulder     OTHER SURGICAL HISTORY      kidney stones     TONSILLECTOMY       TOTAL HIP ARTHROPLASTY Left 5/28/2015    Procedure: LEFT HIP TOTAL ARTHROPLASTY;  Surgeon: Chay Moran MD;   Location: [facility] OR;  Service:      TUBAL LIGATION       VEIN LIGATION AND STRIPPING               Social History:    reports that she quit smoking about 30 years ago. She has never used smokeless tobacco. She reports that she does not drink alcohol and does not use drugs.           Family History:     Family History   Problem Relation Age of Onset     Heart Disease Maternal Grandfather               Allergies:     Allergies   Allergen Reactions     Doxycycline Calcium [Doxycycline] Hives     Mirapex [Pramipexole] Hives     Prilosec [Omeprazole] Hives     Sulfa (Sulfonamide Antibiotics) [Sulfa Drugs] Other (See Comments)     GI Upset     Zithromax [Azithromycin] Unknown     Listed on H&P     Antihistamines - Alkylamine [Alkylamines] Anxiety              Medications:     Prior to Admission medications    Medication Sig Start Date End Date Taking? Authorizing Provider   albuterol (PROVENTIL HFA;VENTOLIN HFA) 90 mcg/actuation inhaler [ALBUTEROL (PROVENTIL HFA;VENTOLIN HFA) 90 MCG/ACTUATION INHALER] Inhale 1-2 puffs every 4 (four) hours as needed for wheezing or shortness of breath. 5/28/15  Yes Provider, Historical   carboxymethylcellulose PF (REFRESH LIQUIGEL) 1 % ophthalmic gel Place 1 drop into both eyes 3 times daily as needed for dry eyes   Yes Unknown, Entered By History   cholecalciferol, vitamin D3, 2,000 unit Tab [CHOLECALCIFEROL, VITAMIN D3, 2,000 UNIT TAB] Take 2,000 Units by mouth once daily.  5/22/15  Yes Provider, Historical   fluticasone (FLONASE) 50 MCG/ACT nasal spray Spray 1 spray into both nostrils daily 9/17/21  Yes Mauro Tang MD   gabapentin (NEURONTIN) 100 MG capsule Take 100 mg by mouth At Bedtime  5/22/15  Yes Provider, Historical   lansoprazole (PREVACID) 30 MG capsule Take 30 mg by mouth every morning (before breakfast)  5/28/15  Yes Provider, Historical   mometasone-formoterol (DULERA) 200-5 mcg/actuation HFAA inhaler Inhale 2 puffs into the lungs 2 times daily  5/28/15  Yes  Provider, Historical   polyethylene glycol (MIRALAX) 17 g packet Take 17 g by mouth At Bedtime   Yes Unknown, Entered By History   umeclidinium (INCRUSE ELLIPTA) 62.5 MCG/INH inhaler Inhale 1 puff into the lungs daily   Yes Unknown, Entered By History              Review of Systems:   The Review of Systems is negative other than noted in the HPI            Physical Exam:     Patient Vitals for the past 24 hrs:   BP Temp Temp src Pulse Resp SpO2   01/03/22 0739 128/60 98.7  F (37.1  C) Oral 74 18 94 %   01/02/22 2313 119/56 98.9  F (37.2  C) Oral 79 18 93 %   01/02/22 1613 117/57 100  F (37.8  C) Oral 75 20 95 %          Intake/Output Summary (Last 24 hours) at 1/3/2022 0815  Last data filed at 1/3/2022 0800  Gross per 24 hour   Intake 1140 ml   Output 450 ml   Net 690 ml      Constitutional:   awake, alert, cooperative, no apparent distress, and appears stated age       Eyes:   PERRL, conjunctiva/corneas clear, EOM's intact; no scleral edema or icterus noted        ENT:   Normocephalic, without obvious abnormality, atraumatic, Lips, mucosa, and tongue normal        Hematologic / Lymphatic:   No lymphadenopathy       Lungs:   Normal respiratory effort, no accessory muscle use       Cardiovascular:   Regular rate and rhythm       Abdomen:   Soft, nondistended, nontender, healed midline incisions,        Musculoskeletal:   No obvious swelling, bruising or deformity       Skin:   Skin color and texture normal for patient, no rashes or lesions              Data:        Admission on 01/02/2022   Component Date Value     Sodium 01/02/2022 138      Potassium 01/02/2022 3.9      Chloride 01/02/2022 99      Carbon Dioxide (CO2) 01/02/2022 25      Anion Gap 01/02/2022 14      Urea Nitrogen 01/02/2022 19      Creatinine 01/02/2022 0.96      Calcium 01/02/2022 10.3      Glucose 01/02/2022 172*     Alkaline Phosphatase 01/02/2022 91      AST 01/02/2022 24      ALT 01/02/2022 14      Protein Total 01/02/2022 8.2*     Albumin  01/02/2022 3.8      Bilirubin Total 01/02/2022 0.8      GFR Estimate 01/02/2022 59*     Lipase 01/02/2022 10      Lactic Acid 01/02/2022 1.2      WBC Count 01/02/2022 19.4*     RBC Count 01/02/2022 5.50*     Hemoglobin 01/02/2022 15.8*     Hematocrit 01/02/2022 49.1*     MCV 01/02/2022 89      MCH 01/02/2022 28.7      MCHC 01/02/2022 32.2      RDW 01/02/2022 13.2      Platelet Count 01/02/2022 454*     % Neutrophils 01/02/2022 91      % Lymphocytes 01/02/2022 4      % Monocytes 01/02/2022 5      % Eosinophils 01/02/2022 0      % Basophils 01/02/2022 0      % Immature Granulocytes 01/02/2022 0      NRBCs per 100 WBC 01/02/2022 0      Absolute Neutrophils 01/02/2022 17.4*     Absolute Lymphocytes 01/02/2022 0.8      Absolute Monocytes 01/02/2022 1.0      Absolute Eosinophils 01/02/2022 0.0      Absolute Basophils 01/02/2022 0.1      Absolute Immature Granul* 01/02/2022 0.1      Absolute NRBCs 01/02/2022 0.0      SARS CoV2 PCR 01/02/2022 Negative      Creatinine 01/02/2022 0.78      GFR Estimate 01/02/2022 76      Campylobacter group 01/02/2022 Not Detected      Salmonella species 01/02/2022 Not Detected      Shigella species 01/02/2022 Not Detected      Vibrio group 01/02/2022 Not Detected      Rotavirus 01/02/2022 Not Detected      Shiga toxin 1 gene 01/02/2022 Not Detected      Shiga toxin 2 gene 01/02/2022 Not Detected      Norovirus I and II 01/02/2022 Not Detected      Yersinia enterocolitica 01/02/2022 Not Detected      C Difficile Toxin B by P* 01/02/2022 Negative      Fecal Lactoferrin 01/02/2022 Positive*     WBC Count 01/02/2022 11.3*     RBC Count 01/02/2022 4.45      Hemoglobin 01/02/2022 12.8      Hematocrit 01/02/2022 40.4      MCV 01/02/2022 91      MCH 01/02/2022 28.8      MCHC 01/02/2022 31.7      RDW 01/02/2022 13.4      Platelet Count 01/02/2022 366      % Neutrophils 01/02/2022 82      % Lymphocytes 01/02/2022 8      % Monocytes 01/02/2022 9      % Eosinophils 01/02/2022 1      % Basophils  01/02/2022 0      % Immature Granulocytes 01/02/2022 0      NRBCs per 100 WBC 01/02/2022 0      Absolute Neutrophils 01/02/2022 9.2*     Absolute Lymphocytes 01/02/2022 1.0      Absolute Monocytes 01/02/2022 1.0      Absolute Eosinophils 01/02/2022 0.1      Absolute Basophils 01/02/2022 0.0      Absolute Immature Granul* 01/02/2022 0.0      Absolute NRBCs 01/02/2022 0.0      WBC Count 01/03/2022 10.2      RBC Count 01/03/2022 4.23      Hemoglobin 01/03/2022 12.2      Hematocrit 01/03/2022 39.2      MCV 01/03/2022 93      MCH 01/03/2022 28.8      MCHC 01/03/2022 31.1*     RDW 01/03/2022 13.3      Platelet Count 01/03/2022 310      Sodium 01/03/2022 143      Potassium 01/03/2022 3.8      Chloride 01/03/2022 111*     Carbon Dioxide (CO2) 01/03/2022 25      Anion Gap 01/03/2022 7      Urea Nitrogen 01/03/2022 12      Creatinine 01/03/2022 0.72      Calcium 01/03/2022 8.1*     Glucose 01/03/2022 93      GFR Estimate 01/03/2022 84          All imaging studies reviewed by me.

## 2022-01-03 NOTE — PLAN OF CARE
Problem: Nausea and Vomiting (Intestinal Obstruction)  Goal: Nausea and Vomiting Relief  1/3/2022 0514 by Mercedes Dejesus, RN  Outcome: Improving     Denied N/V overnight. Tolerated clear liquid diet. Continues to have loose stools. IV zosyn infused. VSS on 2L o2 nc.       Problem: Pain (Intestinal Obstruction)  Goal: Acceptable Pain Control  1/3/2022 0514 by Mercedes Dejesus, RN  Outcome: Improving   Denied pain on shift. Slept well. Ax1 with gb. Alarms on for safety. Enteric precautions maintained.

## 2022-01-03 NOTE — PLAN OF CARE
Patient has had multiple episodes of diarrhea today. On enteric precautions until results of stool study come back. No complaints of nausea but patient received one dose of zofran before gastrografin to help prevent nausea. Patient has been NPO until after follow up x ray this evening. Patient denies pain. On IVF and antibiotics. Patient had a low grade fever this evening.     Mahogany Gaspar RN 1/2/2022

## 2022-01-03 NOTE — PLAN OF CARE
Problem: Adult Inpatient Plan of Care  Goal: Optimal Comfort and Wellbeing  Outcome: Improving        VSS on 2 L O2 NC. Patient has been NPO until follow up xray. Denies nausea. Denies pain. Continues to have diarrhea. Ax1 with gb and walker. Calls appropriately. Enteric precautions maintained.

## 2022-01-03 NOTE — PROGRESS NOTES
"CLINICAL NUTRITION SERVICES - ASSESSMENT NOTE     Nutrition Prescription    Malnutrition Status:    Pt does not meet 2 criteria      REASON FOR ASSESSMENT  Blessing Castellanos is a/an 81 year old female assessed by the dietitian for Admission Nutrition Risk Screen     Pt admitted with diverticulitis, complex with possible fistula, bereavement with husbands passing    NUTRITION HISTORY  NKFA  She feels her appetite is good   She doesn't drink supplements     CURRENT NUTRITION ORDERS  Diet: Full Liquid  Intake/Tolerance: 100%    LABS  Labs reviewed    MEDICATIONS  Medications reviewed    ANTHROPOMETRICS  Height: 172.7 cm (5' 8\")  Most Recent Weight: 81.8 kg (180 lb 4.8 oz)    IBW: 64 kg  BMI: Overweight BMI 25-29.9  Weight History:   Wt Readings from Last 5 Encounters:   01/02/22 81.8 kg (180 lb 4.8 oz)   09/16/21 82.9 kg (182 lb 12.8 oz)   05/28/15 94.3 kg (208 lb)       Dosing Weight: 64 kg    ASSESSED NUTRITION NEEDS  Estimated Energy Needs: 0447-5661 kcals/day (25 - 30 kcals/kg)  Justification: Maintenance  Estimated Protein Needs: 64-77 grams protein/day (1 - 1.2 grams of pro/kg)  Justification: Maintenance  Estimated Fluid Needs: 5831-7191 mL/day (25 - 30 mL/kg)   Justification: Maintenance    PHYSICAL FINDINGS  See malnutrition section below.  WDL      MALNUTRITION:  % Weight Loss:  Weight loss does not meet criteria for malnutrition   % Intake:  No decreased intake noted  Subcutaneous Fat Loss:  None observed  Muscle Loss:  None observed  Fluid Retention:  None noted    Malnutrition Diagnosis: Patient does not meet two of the above criteria necessary for diagnosing malnutrition      NUTRITION DIAGNOSIS  None at this time     INTERVENTIONS  Implementation  None at this time     Goals  Diet advancement vs nutrition support within 2-3 days.  Patient to consume % of nutritionally adequate meals three times per day, or the equivalent with supplements/snacks.     Monitoring/Evaluation  Progress toward goals " will be monitored and evaluated per protocol.

## 2022-01-03 NOTE — PROGRESS NOTES
"[facility] Medicine PROGRESS NOTE      Identification/Summary:   Blessing Castellanos is a 81 year old female who has past medical history of diverticulitis with possible fistula admitted 7/20, 9/21, Bell's palsy, COPD GERD Hypercholesteremia, Nephrolithiasis, Osteoarthritis, Raynaud's disease, Restless leg, and Spinal stenosis presented with abdominal pain nausea vomiting and found to have diverticulitis likely chronic, question of fistula, SBO on CT scan. She has leukocytosis up to 19 concerning for  Infection.  On Zosyn.  Last colonoscopy 2 years ago. Improving on antibiotics.  Tolerating clear liquid diet.  Continue IV antibiotics for now.     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  Tolerating full liquid diet   Leukocytosis resolved   on Zosyn  Outpatient colonoscopy recommended in 2 months  Appreciate GI input  Follow-up with surgery as outpatient for possible sigmoid resection    SBO  Gastrografin challenge 1/2 no evidence of obstruction  Tolerating diet     History of COPD  No sign of exacerbation  Continue home inhalers  History of restless legs  History of GERD  Continue home PPI    Diet: Advance Diet as Tolerated: Full Liquid Diet; Full Liquid Diet  DVT Prophylaxis:   On Lovenox  Code Status: No CPR- Do NOT Intubate    Anticipated possible discharge in 2 days to once IV antibiotics milestones are met.    Interval History/Subjective:  She has ongoing diarrhea following Gastrografin challenge.  No blood in stools or black stools.  Denies any abdominal pain.  Some cramping.    had full liquid diet for breakfast.    Physical Exam/Objective:  Vitals I/O   Vital signs:  Temp: 98.7  F (37.1  C) Temp src: Oral BP: 128/60 Pulse: 74   Resp: 18 SpO2: 94 % O2 Device: Nasal cannula Oxygen Delivery: 2 LPM Height: 172.7 cm (5' 8\") Weight: 81.8 kg (180 lb 4.8 oz)  Estimated body mass index is 27.41 kg/m  as calculated from the following:    Height as of this encounter: 1.727 m (5' " "8\").    Weight as of this encounter: 81.8 kg (180 lb 4.8 oz).       I/O last 3 completed shifts:  In: 900 [P.O.:100; I.V.:800]  Out: 450 [Urine:450]     Body mass index is 27.41 kg/m .    General Appearance:  Alert, cooperative, no distress   Head:  Normocephalic, atraumatic   Eyes:  PERRL    Throat:  mucosa; moist   Neck: No JVD, thyromegaly   Lungs:    Decreased breath sounds bilaterally, respirations unlabored   Chest Wall:  No tenderness or deformity   Heart:  Regular rate and rhythm, S1, S2 normal,no murmur   Abdomen:   Soft, non tender non distended, bowel sounds present, no guarding or rigidity   Extremities: No edema, no joint swelling   Skin: Skin color, texture, turgor normal, no rashes or lesions   Neurologic: Alert and oriented X 3, Moves all 4 extremities       Medications:   Personally Reviewed.    enoxaparin ANTICOAGULANT  40 mg Subcutaneous Q24H     fluticasone  1 spray Both Nostrils Daily     fluticasone-vilanterol  1 puff Inhalation Daily     gabapentin  100 mg Oral At Bedtime     pantoprazole  40 mg Oral QAM AC     piperacillin-tazobactam  3.375 g Intravenous Q8H     sodium chloride (PF)  3 mL Intracatheter Q8H     umeclidinium  1 puff Inhalation Daily     Vitamin D3  2,000 Units Oral Daily       Data reviewed today: I personally reviewed all new medications, labs, imaging/diagnostics reports over the past 24 hours. Pertinent findings include    Labs:  Most Recent 3 CBC's:  Recent Labs   Lab Test 01/03/22  0601 01/02/22  1043 01/02/22  0116   WBC 10.2 11.3* 19.4*   HGB 12.2 12.8 15.8*   MCV 93 91 89    366 454*     Most Recent 3 BMP's:  Recent Labs   Lab Test 01/03/22  0601 01/02/22  0452 01/02/22  0116 09/16/21  0631     --  138 140   POTASSIUM 3.8  --  3.9 4.3   CHLORIDE 111*  --  99 108*   CO2 25  --  25 28   BUN 12  --  19 15   CR 0.72 0.78 0.96 0.87   ANIONGAP 7  --  14 4*   HANG 8.1*  --  10.3 8.2*   GLC 93  --  172* 104     Most Recent 2 LFT's:  Recent Labs   Lab Test " 01/02/22  0116 07/30/20  1602   AST 24 19   ALT 14 11   ALKPHOS 91 99   BILITOTAL 0.8 0.5       Imaging:   Recent Results (from the past 24 hour(s))   XR Gastrografin Challenge    Narrative    EXAM: XR GASTROGRAFIN CHALLENGE  LOCATION: [facility]  DATE/TIME: 1/2/2022 1:16 PM    INDICATION: Gastrografin UGI Challenge  COMPARISON: Earlier today  TECHNIQUE: Routine water soluble contrast follow-through challenge.    FINDINGS:  FLUOROSCOPIC TIME: 0 minutes  NUMBER OF FLUOROSCOPIC IMAGES: 2    Images taken 8 hours hours post Gastrografin challenge shows that contrast has passed into the entire colon. No abnormally dilated loops of bowel.    Contrast also seen in the bladder due to recent IV contrast infusion.    Extensive multilevel thoracolumbar spinal fusion apparatus. Prior left total hip arthroplasty.      Impression    IMPRESSION: Contrast freely enters the colon.       Yari Holman MD  Hospitalist  [facility]

## 2022-01-04 VITALS
OXYGEN SATURATION: 92 % | BODY MASS INDEX: 27.32 KG/M2 | HEART RATE: 65 BPM | WEIGHT: 180.3 LBS | SYSTOLIC BLOOD PRESSURE: 163 MMHG | RESPIRATION RATE: 16 BRPM | DIASTOLIC BLOOD PRESSURE: 72 MMHG | HEIGHT: 68 IN | TEMPERATURE: 98.2 F

## 2022-01-04 PROCEDURE — 250N000013 HC RX MED GY IP 250 OP 250 PS 637: Performed by: INTERNAL MEDICINE

## 2022-01-04 PROCEDURE — 250N000011 HC RX IP 250 OP 636: Performed by: HOSPITALIST

## 2022-01-04 PROCEDURE — 99239 HOSP IP/OBS DSCHRG MGMT >30: CPT | Performed by: HOSPITALIST

## 2022-01-04 PROCEDURE — 99231 SBSQ HOSP IP/OBS SF/LOW 25: CPT | Performed by: PHYSICIAN ASSISTANT

## 2022-01-04 RX ORDER — CIPROFLOXACIN 500 MG/1
500 TABLET, FILM COATED ORAL 2 TIMES DAILY
Qty: 10 TABLET | Refills: 0 | Status: ON HOLD | OUTPATIENT
Start: 2022-01-05 | End: 2022-01-10

## 2022-01-04 RX ORDER — METRONIDAZOLE 500 MG/1
500 TABLET ORAL 3 TIMES DAILY
Qty: 15 TABLET | Refills: 0 | Status: ON HOLD | OUTPATIENT
Start: 2022-01-05 | End: 2022-01-10

## 2022-01-04 RX ADMIN — PIPERACILLIN AND TAZOBACTAM 3.38 G: 3; .375 INJECTION, POWDER, LYOPHILIZED, FOR SOLUTION INTRAVENOUS at 04:37

## 2022-01-04 RX ADMIN — Medication 2000 UNITS: at 09:19

## 2022-01-04 RX ADMIN — UMECLIDINIUM 1 PUFF: 62.5 AEROSOL, POWDER ORAL at 09:19

## 2022-01-04 RX ADMIN — FLUTICASONE PROPIONATE 1 SPRAY: 50 SPRAY, METERED NASAL at 09:19

## 2022-01-04 RX ADMIN — PIPERACILLIN AND TAZOBACTAM 3.38 G: 3; .375 INJECTION, POWDER, LYOPHILIZED, FOR SOLUTION INTRAVENOUS at 11:25

## 2022-01-04 RX ADMIN — PANTOPRAZOLE SODIUM 40 MG: 20 TABLET, DELAYED RELEASE ORAL at 05:54

## 2022-01-04 RX ADMIN — ENOXAPARIN SODIUM 40 MG: 100 INJECTION SUBCUTANEOUS at 05:54

## 2022-01-04 RX ADMIN — FLUTICASONE FUROATE AND VILANTEROL TRIFENATATE 1 PUFF: 200; 25 POWDER RESPIRATORY (INHALATION) at 09:19

## 2022-01-04 ASSESSMENT — ACTIVITIES OF DAILY LIVING (ADL)
ADLS_ACUITY_SCORE: 7
ADLS_ACUITY_SCORE: 9
ADLS_ACUITY_SCORE: 7
ADLS_ACUITY_SCORE: 9
ADLS_ACUITY_SCORE: 7

## 2022-01-04 NOTE — PLAN OF CARE
Problem: Adult Inpatient Plan of Care  Goal: Plan of Care Review  Outcome: Improving     Problem: Adult Inpatient Plan of Care  Goal: Optimal Comfort and Wellbeing  Outcome: Improving   Denies having any pain or nausea.     Problem: Infection (Intestinal Obstruction)  Goal: Absence of Infection Signs and Symptoms  Outcome: Improving   IV abx administered as ordered. VSS on 2L O2 via NC.     Ambulating with assist x1. Call light within reach and calls appropriately.

## 2022-01-04 NOTE — CONSULTS
Integrative Therapy Consult    Healing PresenceYes  Essential Oils: Patient declined          (doesn't like smell)   Healing Music:       Breathwork:       Guided Imagery:       Acupressure:       Oshibori:       Energy Therapy:       Healing Touch:       Reiki:       Qi Gong:     Massage: Hand,Foot      Targeted Massage:    Sleep Promotion:       Other Therapy:       Intervention Reason: Comfort,Well Being     Pre and Post Session Scores: Patient Desires Treatment: yes                             Delivery:         Referrals:      Haley Rosas

## 2022-01-04 NOTE — DISCHARGE INSTRUCTIONS
Myra Stein    General - Family Medicine    [phone]   It is recommended to follow up with your primary care physician in 5-7 days

## 2022-01-04 NOTE — PROGRESS NOTES
Discharged to home with daughter. Stated understanding of discharge instructions. Checked room for belongings.

## 2022-01-04 NOTE — PROGRESS NOTES
General Surgery Progress Note:    Hospital Day # 2    ASSESSMENT:   1. SBO (small bowel obstruction) (H)    2. Colitis        Blessing Castellanos is a 81 year old female with SBO and colitis with resolved SBO    PLAN:   -ADAT from surgical standpoint  -No surgical indication, we will sign off. Please reach out if any further needs arise. Ok to discharge from our standpoint once medically stable.     Juan Kumar PA-C  Pager - [phone]  Phone - [phone]   General Surgery    SUBJECTIVE:   Blessing Castellanos is doing good, having BMs, wants to eat more, no n/v    Patient Vitals for the past 24 hrs:   BP Temp Temp src Pulse Resp SpO2   01/04/22 0919 -- -- -- -- -- 92 %   01/04/22 0918 -- -- -- -- -- 93 %   01/04/22 0742 (!) 163/72 98.2  F (36.8  C) Oral 65 16 93 %   01/04/22 0100 -- -- -- -- -- 94 %   01/03/22 2358 -- -- -- -- -- 94 %   01/03/22 2351 113/56 98.3  F (36.8  C) Oral 89 16 (!) 83 %   01/03/22 2052 134/62 98.7  F (37.1  C) Oral 64 18 96 %   01/03/22 1606 123/56 98.6  F (37  C) Oral 68 16 95 %       Physical Exam:  General: NAD, pleasant  CV:RRR  LUNGS:CTA bilaterally  ABD: soft, nondistened, nontender  EXT:no CCE     Lab Results   Component Value Date    WBC 10.2 01/03/2022    HGB 12.2 01/03/2022    HCT 39.2 01/03/2022    MCV 93 01/03/2022     01/03/2022     INR/Prothrombin Time  Recent Labs   Lab 01/03/22  0601      CO2 25   BUN 12     Lab Results   Component Value Date    ALT 14 01/02/2022    AST 24 01/02/2022    ALKPHOS 91 01/02/2022

## 2022-01-04 NOTE — PROGRESS NOTES
"GI Progress Note  Blessing Castellanos  [id number]     Subjective:   Sitting up in chair.  No complaints. Tolerating full liquids well.  On IV Zosyn.   Will be OK for discharge on oral antibiotics today. Patient eager for discharge.  Daughter gets off at 3 PM today.   Denies any abdominal pain.   She has questions about fiber intake.  We discussed low fiber diet for the next 2 weeks and then gradually add fiber back in over the following couple of weeks to get up to 25 g/day.  Takes MiraLAX every day at home which prevents constipation for her.    Objective:   BP (!) 163/72 (BP Location: Right arm)   Pulse 65   Temp 98.2  F (36.8  C) (Oral)   Resp 16   Ht 1.727 m (5' 8\")   Wt 81.8 kg (180 lb 4.8 oz)   SpO2 92%   BMI 27.41 kg/m    Body mass index is 27.41 kg/m .   Gen: No acute distress  Cardio: RRR  GI: Non-distended, BS positive, soft, non-tender to palpation, even in left lower quadrant. No guarding.    Laboratory  Recent Labs   Lab 01/03/22  0601 01/02/22  1043 01/02/22  0116   WBC 10.2 11.3* 19.4*   RBC 4.23 4.45 5.50*   HGB 12.2 12.8 15.8*   HCT 39.2 40.4 49.1*   MCV 93 91 89   MCH 28.8 28.8 28.7   MCHC 31.1* 31.7 32.2   RDW 13.3 13.4 13.2    366 454*      Recent Labs   Lab 01/03/22  0601 01/02/22  0116    138   CO2 25 25   BUN 12 19     Recent Labs   Lab 01/02/22  0116   ALKPHOS 91   AST 24   ALT 14     CT Abdomen Pelvis w Contrast    Result Date: 1/2/2022  EXAM: CT ABDOMEN PELVIS W CONTRAST LOCATION: [facility] DATE/TIME: 1/2/2022 1:34 AM INDICATION: Abdominal pain, acute, nonlocalized COMPARISON: 10/15/2021 TECHNIQUE: CT scan of the abdomen and pelvis was performed following injection of IV contrast. Multiplanar reformats were obtained. Dose reduction techniques were used. CONTRAST: 100 mL Isovue-370 FINDINGS: LOWER CHEST: Mild basilar fibroatelectasis. Small hiatal hernia. HEPATOBILIARY: Normal. PANCREAS: Normal. SPLEEN: Normal. ADRENAL GLANDS: Normal. " KIDNEYS/BLADDER: Subcentimeter renal hypodensities small for characterization, likely tiny cysts. BOWEL: Fluid-filled, mildly distended small bowel transitioning in a thickened segment of distal ileum within the lower pelvis series 3 images 170-181. Diverticulosis. Persistent thickening of sigmoid colon. Slight adjacent inflammation and trace amount of fluid. The possibility of fistula between the sigmoid colon and adjacent small bowel not excluded series 3 image 170-172. LYMPH NODES: Normal. VASCULATURE: Diffuse, atherosclerotic vascular calcification. PELVIC ORGANS: Absent uterus. MUSCULOSKELETAL: Hip arthroplasty. Degenerative change osseous structures. Postsurgical change lumbar spine. Mild anterolisthesis L4 on L5.     IMPRESSION: 1.  Low-grade small bowel obstruction transitioning in a thickened segment of distal ileum. Underlying enteritis not excluded. 2.  Persistent wall thickening of the sigmoid colon. Findings could be due to chronic colitis/diverticulitis, chronic stricture versus neoplasm. Colonoscopy follow-up recommended if not recently performed. The possibility of fistula between the thickened  segment of sigmoid colon and the adjacent thickened segment of distal ileum not excluded. 3.  Small amount of free fluid.    XR Gastrografin Challenge    Result Date: 1/2/2022  EXAM: XR GASTROGRAFIN CHALLENGE LOCATION: [facility] DATE/TIME: 1/2/2022 1:16 PM INDICATION: Gastrografin UGI Challenge COMPARISON: Earlier today TECHNIQUE: Routine water soluble contrast follow-through challenge. FINDINGS: FLUOROSCOPIC TIME: 0 minutes NUMBER OF FLUOROSCOPIC IMAGES: 2 Images taken 8 hours hours post Gastrografin challenge shows that contrast has passed into the entire colon. No abnormally dilated loops of bowel. Contrast also seen in the bladder due to recent IV contrast infusion. Extensive multilevel thoracolumbar spinal fusion apparatus. Prior left total hip arthroplasty.     IMPRESSION:  Contrast freely enters the colon.      Assessment:   1.  Diverticulitis.  Appears focused on the sigmoid colon with likely fistula formation.   Clinically improving.  Leukocytosis resolved.  Small bowel follow-through showed contrast making it through to the colon when images were taken at 8 hours.  Treating with IV Zosyn; today is day 3.    Cannot exclude neoplasm or IBD, given chronicity of fistula (seen in July 2020).  Previously with diverticulitis June 2020.  In July 2020 CT showed colon thickening with possible fistula and SBO.  September 2021 CT again showed sigmoid bowel wall thickening extending to the adjacent small bowel.  -Colonoscopy previously recommended but she lost her  12/21/2021 to cancer.  She is concerned about not being able to find a ride for colonoscopy; has 6 children and reports that they are all quite busy.  However, after further discussion she is more agreeable now.  We discussed that [facility] would be a consideration for her.  She lives in [place] and thus could potentially have a colonoscopy done there.  However, continuing to follow with [facility] would be helpful in continuity of care.  -Surgery has seen the patient.  With clinical improvement they have signed off.  2.  Partial small bowel obstruction.  This is based on CT images.  This has resolved; Gastrografin made it through to the colon on 1/2/22.  Tolerating full liquids.  History of constipation.  Uses MiraLAX every day at home.  -Diarrhea has improved.    3.  COPD.  On oxygen.  History of former tobacco use    Patient Active Problem List   Diagnosis     Status post left hip replacement     PONV (postoperative nausea and vomiting)     Raynaud's disease     COPD (chronic obstructive pulmonary disease) (H)     Restless leg     GERD (gastroesophageal reflux disease)     SBO (small bowel obstruction) (H)     Colitis     Acute recurrent sinusitis, unspecified location     Plan:   1.  Can receive IV Zosyn at 11 AM today, then  switch to oral antibiotics this evening.  Would discharge home on ciprofloxacin 500 mg p.o. BID and Flagyl 500mg po TID x 5 days.   2.  Low fiber diet (10-12 g total fiber per day) for 10 more days.  Then gradually increase fiber by a couple grams per day for 2 weeks thereafter to get up to 25 g of fiber per day.    3.  Outpatient colonoscopy in approximately 2 months.  [facility] will contact her to help make sure this is completed.  4.  Patient should follow-up with GI after her colonoscopy has been completed.  We will help arrange for that.    The patient remained stable, she is okay for discharge from GI perspective.  Please contact us with any questions or concerns prior to her leaving today.  Thank you for involving us in this pleasant woman's care.  Les Sousa PA-C  [facility]  [phone]

## 2022-01-04 NOTE — PLAN OF CARE
Problem: Adult Inpatient Plan of Care  Goal: Optimal Comfort and Wellbeing  Outcome: Adequate for Discharge     Problem: Nausea and Vomiting (Intestinal Obstruction)  Goal: Nausea and Vomiting Relief  Outcome: Adequate for Discharge     Problem: Infection (Intestinal Obstruction)  Goal: Absence of Infection Signs and Symptoms  Outcome: Adequate for Discharge     VSS on room air and voiding adequately. IV abx administered as ordered. Patient discharged to home with family support.

## 2022-01-04 NOTE — DISCHARGE SUMMARY
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        PROCEDURES ( this hospitalization only)    None  RECOMMENDATIONS TO OUTPATIENT PROVIDER FOR F/U VISIT   Follow-up Appointments     Follow-up and recommended labs and tests       Follow up with primary care provider, MYRA STEIN, within 3 to 5   days, for hospital follow- up. The following labs/tests are recommended:   Basic metabolic panel, CBC.           Make sure she has follow-up with gastroenterology for colonoscopy and subsequent follow-up  DISPOSITION   Home  SUMMARY OF HOSPITAL COURSE:    Blessing Castellanos is a 81 year old female who has past medical history of diverticulitis with possible fistula admitted 7/20, 9/21, Bell's palsy, COPD GERD Hypercholesteremia, Nephrolithiasis, Osteoarthritis, Raynaud's disease, Restless leg, and Spinal stenosis presented with abdominal pain, nausea vomiting and found to have diverticulitis likely chronic, question of fistula, SBO on CT scan. She had leukocytosis up to 19 concerning for  Infection.  Treated with Zosyn.  Gastroenterology and general surgery were consulted. Last colonoscopy 2 years ago.  Improving clinically with antibiotics,  tolerated advancement of diet.   Transitioned to oral antibiotics  for 5 more days after discharge.  Discharge Medications with Med changes:     Current Discharge Medication List      START taking these medications    Details   ciprofloxacin (CIPRO) 500 MG tablet Take 1 tablet (500 mg) by mouth 2 times daily for 5 days  Qty: 10 tablet, Refills: 0    Associated Diagnoses: Diverticulitis of colon      metroNIDAZOLE (FLAGYL) 500 MG tablet Take 1 tablet (500 mg) by mouth 3 times daily for 5 days  Qty: 15 tablet, Refills: 0    Associated Diagnoses: Diverticulitis of colon         CONTINUE these medications which have NOT CHANGED    Details   albuterol (PROVENTIL HFA;VENTOLIN HFA) 90 mcg/actuation inhaler [ALBUTEROL (PROVENTIL HFA;VENTOLIN HFA) 90 MCG/ACTUATION INHALER] Inhale 1-2 puffs every 4 (four) hours as needed for wheezing or shortness of breath.      carboxymethylcellulose PF (REFRESH LIQUIGEL) 1 % ophthalmic gel Place 1 drop into both eyes 3 times daily as needed for dry eyes      cholecalciferol, vitamin D3, 2,000 unit Tab [CHOLECALCIFEROL, VITAMIN D3, 2,000 UNIT TAB] Take 2,000 Units by mouth once daily.       fluticasone (FLONASE) 50 MCG/ACT nasal spray Spray 1 spray into both nostrils daily  Qty: 11.1 mL, Refills: 0    Associated Diagnoses: Acute recurrent sinusitis, unspecified location      gabapentin (NEURONTIN) 100 MG capsule Take 100 mg by mouth At Bedtime       lansoprazole (PREVACID) 30 MG capsule Take 30 mg by mouth every morning (before breakfast)       mometasone-formoterol (DULERA) 200-5 mcg/actuation HFAA inhaler Inhale 2 puffs into the lungs 2 times daily       polyethylene glycol (MIRALAX) 17 g packet Take 17 g by mouth At Bedtime      umeclidinium (INCRUSE ELLIPTA) 62.5 MCG/INH inhaler Inhale 1 puff into the lungs daily           Rationale for medication changes:    Only change was addition of oral antibiotics  Consults   General surgery and gastroenterology  Anticoagulation Information    Not applicable  SIGNIFICANT IMAGING FINDINGS     Results for orders  placed or performed during the hospital encounter of 01/02/22   CT Abdomen Pelvis w Contrast    Impression    IMPRESSION:   1.  Low-grade small bowel obstruction transitioning in a thickened segment of distal ileum. Underlying enteritis not excluded.  2.  Persistent wall thickening of the sigmoid colon. Findings could be due to chronic colitis/diverticulitis, chronic stricture versus neoplasm. Colonoscopy follow-up recommended if not recently performed. The possibility of fistula between the thickened   segment of sigmoid colon and the adjacent thickened segment of distal ileum not excluded.  3.  Small amount of free fluid.   XR Gastrografin Challenge    Impression    IMPRESSION: Contrast freely enters the colon.     SIGNIFICANT LABORATORY FINDINGS   Initial white count was 19.4, improved to 10.2 prior to discharge  Normal creatinine at 0.72  C. difficile negative  Stool culture negative  Discharge Orders        Reason for your hospital stay    Diverticulitis     Follow-up and recommended labs and tests     Follow up with primary care provider, JAGUAR LÓPEZ, within 3 to 5 days, for hospital follow- up. The following labs/tests are recommended: Basic metabolic panel, CBC.     Activity    Your activity upon discharge: activity as tolerated     Diet    Follow this diet upon discharge: Orders Placed This Encounter      Advance Diet as Tolerated: Low Fiber diet, try to stay between 10 and 12 g of fiber per day for the next 10 days.  Then okay to increase the amount of fiber you're eating daily by about 2 g every couple of days until you get to 25 g/day.     Examination   Physical Exam   Temp:  [98.2  F (36.8  C)-98.7  F (37.1  C)] 98.2  F (36.8  C)  Pulse:  [64-89] 65  Resp:  [16-18] 16  BP: (113-163)/(56-72) 163/72  SpO2:  [83 %-96 %] 92 %  Wt Readings from Last 1 Encounters:   01/02/22 81.8 kg (180 lb 4.8 oz)       Subjective: Has some mild abdominal pain.  No nausea or vomiting.  Diarrhea has subsided.  No chest  pain.  Says that she does get short of breath with activity which is chronic for her.    Physical Exam:    Gen: no acute distress, comfortable  ENT: no scleral icterus  Pulm: lungs are diminished, breathing comfortably in room air at rest, no wheezing, no retractions  CV: regular rate and rhythm, no significant pitting edema  GI: abdomen is soft, non-tender, non-distended with active bowel sounds.  Derm: Not pale, no jaundice  Psych: appropriate affect       Please see EMR for more detailed significant labs, imaging, consultant notes etc.    I, Carl Velazquez DO, personally saw the patient today and spent greater than 30 minutes discharging this patient.    Carl Velazquez DO  [facility]    CC:Myra Stein

## 2022-01-05 ENCOUNTER — PATIENT OUTREACH (OUTPATIENT)
Dept: CARE COORDINATION | Facility: CLINIC | Age: 82
End: 2022-01-05
Payer: COMMERCIAL

## 2022-01-05 DIAGNOSIS — Z71.89 OTHER SPECIFIED COUNSELING: ICD-10-CM

## 2022-01-05 NOTE — PROGRESS NOTES
Clinic Care Coordination Contact  [facility]/Voicemail       Clinical Data: Care Coordinator Outreach  Outreach attempted x 1.  Left message on patient's voicemail with call back information and requested return call.  Care Coordinator will try to reach patient again in 1-2 business days.      Gaviota Whitley  [phone]  Care

## 2022-01-06 NOTE — PROGRESS NOTES
Clinic Care Coordination Contact  [facility]/Voicemail       Clinical Data: Care Coordinator Outreach  Outreach attempted x 2.  Left message on patient's voicemail with call back information and requested return call.  Care Coordinator will do no further outreaches at this time.      Gaviota Whitley  [phone]  Care

## 2022-01-07 ENCOUNTER — HOSPITAL ENCOUNTER (INPATIENT)
Facility: CLINIC | Age: 82
LOS: 2 days | Discharge: HOME | DRG: 337 | End: 2022-01-10
Attending: EMERGENCY MEDICINE | Admitting: HOSPITALIST
Payer: COMMERCIAL

## 2022-01-07 ENCOUNTER — APPOINTMENT (OUTPATIENT)
Dept: CT IMAGING | Facility: CLINIC | Age: 82
DRG: 337 | End: 2022-01-07
Attending: EMERGENCY MEDICINE
Payer: COMMERCIAL

## 2022-01-07 DIAGNOSIS — K56.609 SBO (SMALL BOWEL OBSTRUCTION) (H): ICD-10-CM

## 2022-01-07 DIAGNOSIS — R10.84 GENERALIZED ABDOMINAL PAIN: ICD-10-CM

## 2022-01-07 DIAGNOSIS — R11.2 NAUSEA AND VOMITING, INTRACTABILITY OF VOMITING NOT SPECIFIED, UNSPECIFIED VOMITING TYPE: ICD-10-CM

## 2022-01-07 DIAGNOSIS — E83.42 HYPOMAGNESEMIA: ICD-10-CM

## 2022-01-07 LAB
ANION GAP SERPL CALCULATED.3IONS-SCNC: 15 MMOL/L (ref 5–18)
BASOPHILS # BLD AUTO: 0 10E3/UL (ref 0–0.2)
BASOPHILS NFR BLD AUTO: 0 %
BUN SERPL-MCNC: 11 MG/DL (ref 8–28)
CALCIUM SERPL-MCNC: 9.6 MG/DL (ref 8.5–10.5)
CHLORIDE BLD-SCNC: 103 MMOL/L (ref 98–107)
CO2 SERPL-SCNC: 21 MMOL/L (ref 22–31)
CREAT SERPL-MCNC: 0.75 MG/DL (ref 0.6–1.1)
EOSINOPHIL # BLD AUTO: 0 10E3/UL (ref 0–0.7)
EOSINOPHIL NFR BLD AUTO: 0 %
ERYTHROCYTE [DISTWIDTH] IN BLOOD BY AUTOMATED COUNT: 13.3 % (ref 10–15)
GFR SERPL CREATININE-BSD FRML MDRD: 80 ML/MIN/1.73M2
GLUCOSE BLD-MCNC: 123 MG/DL (ref 70–125)
HCT VFR BLD AUTO: 45.8 % (ref 35–47)
HGB BLD-MCNC: 14.6 G/DL (ref 11.7–15.7)
IMM GRANULOCYTES # BLD: 0.1 10E3/UL
IMM GRANULOCYTES NFR BLD: 1 %
LYMPHOCYTES # BLD AUTO: 1.2 10E3/UL (ref 0.8–5.3)
LYMPHOCYTES NFR BLD AUTO: 9 %
MAGNESIUM SERPL-MCNC: 1.6 MG/DL (ref 1.8–2.6)
MCH RBC QN AUTO: 28.6 PG (ref 26.5–33)
MCHC RBC AUTO-ENTMCNC: 31.9 G/DL (ref 31.5–36.5)
MCV RBC AUTO: 90 FL (ref 78–100)
MONOCYTES # BLD AUTO: 0.9 10E3/UL (ref 0–1.3)
MONOCYTES NFR BLD AUTO: 7 %
NEUTROPHILS # BLD AUTO: 11 10E3/UL (ref 1.6–8.3)
NEUTROPHILS NFR BLD AUTO: 83 %
NRBC # BLD AUTO: 0 10E3/UL
NRBC BLD AUTO-RTO: 0 /100
PLATELET # BLD AUTO: 440 10E3/UL (ref 150–450)
POTASSIUM BLD-SCNC: 3.5 MMOL/L (ref 3.5–5)
RBC # BLD AUTO: 5.11 10E6/UL (ref 3.8–5.2)
SARS-COV-2 RNA RESP QL NAA+PROBE: NEGATIVE
SODIUM SERPL-SCNC: 139 MMOL/L (ref 136–145)
WBC # BLD AUTO: 13.2 10E3/UL (ref 4–11)

## 2022-01-07 PROCEDURE — 74177 CT ABD & PELVIS W/CONTRAST: CPT

## 2022-01-07 PROCEDURE — 96365 THER/PROPH/DIAG IV INF INIT: CPT

## 2022-01-07 PROCEDURE — 83735 ASSAY OF MAGNESIUM: CPT | Performed by: EMERGENCY MEDICINE

## 2022-01-07 PROCEDURE — 36415 COLL VENOUS BLD VENIPUNCTURE: CPT | Performed by: EMERGENCY MEDICINE

## 2022-01-07 PROCEDURE — 258N000003 HC RX IP 258 OP 636: Performed by: EMERGENCY MEDICINE

## 2022-01-07 PROCEDURE — 96361 HYDRATE IV INFUSION ADD-ON: CPT

## 2022-01-07 PROCEDURE — 80048 BASIC METABOLIC PNL TOTAL CA: CPT | Performed by: EMERGENCY MEDICINE

## 2022-01-07 PROCEDURE — G0378 HOSPITAL OBSERVATION PER HR: HCPCS

## 2022-01-07 PROCEDURE — 85025 COMPLETE CBC W/AUTO DIFF WBC: CPT | Performed by: EMERGENCY MEDICINE

## 2022-01-07 PROCEDURE — 250N000011 HC RX IP 250 OP 636: Performed by: EMERGENCY MEDICINE

## 2022-01-07 PROCEDURE — 99222 1ST HOSP IP/OBS MODERATE 55: CPT | Performed by: HOSPITALIST

## 2022-01-07 PROCEDURE — C9803 HOPD COVID-19 SPEC COLLECT: HCPCS

## 2022-01-07 PROCEDURE — 96367 TX/PROPH/DG ADDL SEQ IV INF: CPT

## 2022-01-07 PROCEDURE — 87635 SARS-COV-2 COVID-19 AMP PRB: CPT | Performed by: EMERGENCY MEDICINE

## 2022-01-07 PROCEDURE — 99285 EMERGENCY DEPT VISIT HI MDM: CPT | Mod: 25

## 2022-01-07 PROCEDURE — 96366 THER/PROPH/DIAG IV INF ADDON: CPT

## 2022-01-07 PROCEDURE — 96368 THER/DIAG CONCURRENT INF: CPT

## 2022-01-07 PROCEDURE — 96375 TX/PRO/DX INJ NEW DRUG ADDON: CPT

## 2022-01-07 RX ORDER — PROCHLORPERAZINE 25 MG
12.5 SUPPOSITORY, RECTAL RECTAL EVERY 12 HOURS PRN
Status: DISCONTINUED | OUTPATIENT
Start: 2022-01-07 | End: 2022-01-10 | Stop reason: HOSPADM

## 2022-01-07 RX ORDER — GABAPENTIN 100 MG/1
100 CAPSULE ORAL ONCE
Status: COMPLETED | OUTPATIENT
Start: 2022-01-08 | End: 2022-01-08

## 2022-01-07 RX ORDER — PROCHLORPERAZINE MALEATE 5 MG
5 TABLET ORAL EVERY 6 HOURS PRN
Status: DISCONTINUED | OUTPATIENT
Start: 2022-01-07 | End: 2022-01-10 | Stop reason: HOSPADM

## 2022-01-07 RX ORDER — LIDOCAINE 40 MG/G
CREAM TOPICAL
Status: DISCONTINUED | OUTPATIENT
Start: 2022-01-07 | End: 2022-01-10 | Stop reason: HOSPADM

## 2022-01-07 RX ORDER — ONDANSETRON 2 MG/ML
4 INJECTION INTRAMUSCULAR; INTRAVENOUS ONCE
Status: COMPLETED | OUTPATIENT
Start: 2022-01-07 | End: 2022-01-07

## 2022-01-07 RX ORDER — HYDROMORPHONE HCL IN WATER/PF 6 MG/30 ML
0.2 PATIENT CONTROLLED ANALGESIA SYRINGE INTRAVENOUS
Status: DISCONTINUED | OUTPATIENT
Start: 2022-01-07 | End: 2022-01-08

## 2022-01-07 RX ORDER — PANTOPRAZOLE SODIUM 20 MG/1
40 TABLET, DELAYED RELEASE ORAL
Status: DISCONTINUED | OUTPATIENT
Start: 2022-01-08 | End: 2022-01-10 | Stop reason: HOSPADM

## 2022-01-07 RX ORDER — PIPERACILLIN SODIUM, TAZOBACTAM SODIUM 3; .375 G/15ML; G/15ML
3.38 INJECTION, POWDER, LYOPHILIZED, FOR SOLUTION INTRAVENOUS EVERY 8 HOURS
Status: DISCONTINUED | OUTPATIENT
Start: 2022-01-08 | End: 2022-01-07 | Stop reason: CLARIF

## 2022-01-07 RX ORDER — SODIUM CHLORIDE 9 MG/ML
INJECTION, SOLUTION INTRAVENOUS ONCE
Status: COMPLETED | OUTPATIENT
Start: 2022-01-07 | End: 2022-01-07

## 2022-01-07 RX ORDER — GABAPENTIN 100 MG/1
100 CAPSULE ORAL AT BEDTIME
Status: DISCONTINUED | OUTPATIENT
Start: 2022-01-08 | End: 2022-01-08

## 2022-01-07 RX ORDER — MAGNESIUM SULFATE HEPTAHYDRATE 40 MG/ML
2 INJECTION, SOLUTION INTRAVENOUS ONCE
Status: COMPLETED | OUTPATIENT
Start: 2022-01-07 | End: 2022-01-07

## 2022-01-07 RX ORDER — IOPAMIDOL 755 MG/ML
100 INJECTION, SOLUTION INTRAVASCULAR ONCE
Status: COMPLETED | OUTPATIENT
Start: 2022-01-07 | End: 2022-01-07

## 2022-01-07 RX ORDER — ONDANSETRON 2 MG/ML
4 INJECTION INTRAMUSCULAR; INTRAVENOUS EVERY 6 HOURS PRN
Status: DISCONTINUED | OUTPATIENT
Start: 2022-01-07 | End: 2022-01-08

## 2022-01-07 RX ORDER — ALBUTEROL SULFATE 90 UG/1
1-2 AEROSOL, METERED RESPIRATORY (INHALATION) EVERY 4 HOURS PRN
Status: DISCONTINUED | OUTPATIENT
Start: 2022-01-07 | End: 2022-01-10 | Stop reason: HOSPADM

## 2022-01-07 RX ORDER — ONDANSETRON 4 MG/1
4 TABLET, ORALLY DISINTEGRATING ORAL EVERY 6 HOURS PRN
Status: DISCONTINUED | OUTPATIENT
Start: 2022-01-07 | End: 2022-01-08

## 2022-01-07 RX ORDER — PIPERACILLIN SODIUM, TAZOBACTAM SODIUM 3; .375 G/15ML; G/15ML
3.38 INJECTION, POWDER, LYOPHILIZED, FOR SOLUTION INTRAVENOUS EVERY 8 HOURS
Status: DISCONTINUED | OUTPATIENT
Start: 2022-01-08 | End: 2022-01-09

## 2022-01-07 RX ORDER — FLUTICASONE PROPIONATE 50 MCG
1 SPRAY, SUSPENSION (ML) NASAL DAILY PRN
Status: DISCONTINUED | OUTPATIENT
Start: 2022-01-08 | End: 2022-01-10 | Stop reason: HOSPADM

## 2022-01-07 RX ORDER — FENTANYL CITRATE 50 UG/ML
25 INJECTION, SOLUTION INTRAMUSCULAR; INTRAVENOUS ONCE
Status: COMPLETED | OUTPATIENT
Start: 2022-01-07 | End: 2022-01-07

## 2022-01-07 RX ORDER — SODIUM CHLORIDE, SODIUM LACTATE, POTASSIUM CHLORIDE, CALCIUM CHLORIDE 600; 310; 30; 20 MG/100ML; MG/100ML; MG/100ML; MG/100ML
INJECTION, SOLUTION INTRAVENOUS CONTINUOUS
Status: DISCONTINUED | OUTPATIENT
Start: 2022-01-08 | End: 2022-01-08

## 2022-01-07 RX ORDER — PIPERACILLIN SODIUM, TAZOBACTAM SODIUM 3; .375 G/15ML; G/15ML
3.38 INJECTION, POWDER, LYOPHILIZED, FOR SOLUTION INTRAVENOUS ONCE
Status: COMPLETED | OUTPATIENT
Start: 2022-01-08 | End: 2022-01-08

## 2022-01-07 RX ORDER — CARBOXYMETHYLCELLULOSE SODIUM 10 MG/ML
1 GEL OPHTHALMIC 3 TIMES DAILY PRN
Status: DISCONTINUED | OUTPATIENT
Start: 2022-01-07 | End: 2022-01-10 | Stop reason: HOSPADM

## 2022-01-07 RX ADMIN — FENTANYL CITRATE 25 MCG: 50 INJECTION INTRAMUSCULAR; INTRAVENOUS at 21:34

## 2022-01-07 RX ADMIN — MAGNESIUM SULFATE HEPTAHYDRATE 2 G: 40 INJECTION, SOLUTION INTRAVENOUS at 21:34

## 2022-01-07 RX ADMIN — ONDANSETRON 4 MG: 2 INJECTION INTRAMUSCULAR; INTRAVENOUS at 20:47

## 2022-01-07 RX ADMIN — IOPAMIDOL 100 ML: 755 INJECTION, SOLUTION INTRAVENOUS at 21:11

## 2022-01-07 RX ADMIN — SODIUM CHLORIDE: 9 INJECTION, SOLUTION INTRAVENOUS at 20:47

## 2022-01-07 ASSESSMENT — ENCOUNTER SYMPTOMS
DYSURIA: 0
SHORTNESS OF BREATH: 0
NAUSEA: 1
COUGH: 0
FEVER: 0
ABDOMINAL PAIN: 1
VOMITING: 1

## 2022-01-07 NOTE — ED TRIAGE NOTES
Patient reports that she was discharged on Tuesday for diverticulitis, she had some spaghetti yesterday and started to feel worse patient having N/V/D that began last night.  Patient wears O2 at home for COPD.  Esme Whitley RN.......1/7/2022 5:44 PM

## 2022-01-08 ENCOUNTER — APPOINTMENT (OUTPATIENT)
Dept: RADIOLOGY | Facility: CLINIC | Age: 82
DRG: 337 | End: 2022-01-08
Attending: HOSPITALIST
Payer: COMMERCIAL

## 2022-01-08 ENCOUNTER — ANESTHESIA EVENT (OUTPATIENT)
Dept: SURGERY | Facility: CLINIC | Age: 82
DRG: 337 | End: 2022-01-08
Payer: COMMERCIAL

## 2022-01-08 ENCOUNTER — ANESTHESIA (OUTPATIENT)
Dept: SURGERY | Facility: CLINIC | Age: 82
DRG: 337 | End: 2022-01-08
Payer: COMMERCIAL

## 2022-01-08 LAB
ANION GAP SERPL CALCULATED.3IONS-SCNC: 8 MMOL/L (ref 5–18)
BASOPHILS # BLD AUTO: 0 10E3/UL (ref 0–0.2)
BASOPHILS NFR BLD AUTO: 0 %
BUN SERPL-MCNC: 11 MG/DL (ref 8–28)
CALCIUM SERPL-MCNC: 8.4 MG/DL (ref 8.5–10.5)
CHLORIDE BLD-SCNC: 104 MMOL/L (ref 98–107)
CO2 SERPL-SCNC: 28 MMOL/L (ref 22–31)
CREAT SERPL-MCNC: 0.73 MG/DL (ref 0.6–1.1)
EOSINOPHIL # BLD AUTO: 0.2 10E3/UL (ref 0–0.7)
EOSINOPHIL NFR BLD AUTO: 2 %
ERYTHROCYTE [DISTWIDTH] IN BLOOD BY AUTOMATED COUNT: 13.6 % (ref 10–15)
GFR SERPL CREATININE-BSD FRML MDRD: 82 ML/MIN/1.73M2
GLUCOSE BLD-MCNC: 102 MG/DL (ref 70–125)
HCT VFR BLD AUTO: 41.9 % (ref 35–47)
HGB BLD-MCNC: 13.3 G/DL (ref 11.7–15.7)
IMM GRANULOCYTES # BLD: 0.1 10E3/UL
IMM GRANULOCYTES NFR BLD: 1 %
LYMPHOCYTES # BLD AUTO: 1.2 10E3/UL (ref 0.8–5.3)
LYMPHOCYTES NFR BLD AUTO: 12 %
MAGNESIUM SERPL-MCNC: 2 MG/DL (ref 1.8–2.6)
MCH RBC QN AUTO: 28.9 PG (ref 26.5–33)
MCHC RBC AUTO-ENTMCNC: 31.7 G/DL (ref 31.5–36.5)
MCV RBC AUTO: 91 FL (ref 78–100)
MONOCYTES # BLD AUTO: 0.8 10E3/UL (ref 0–1.3)
MONOCYTES NFR BLD AUTO: 8 %
NEUTROPHILS # BLD AUTO: 7.6 10E3/UL (ref 1.6–8.3)
NEUTROPHILS NFR BLD AUTO: 77 %
NRBC # BLD AUTO: 0 10E3/UL
NRBC BLD AUTO-RTO: 0 /100
PLATELET # BLD AUTO: 354 10E3/UL (ref 150–450)
POTASSIUM BLD-SCNC: 3.5 MMOL/L (ref 3.5–5)
RBC # BLD AUTO: 4.6 10E6/UL (ref 3.8–5.2)
SODIUM SERPL-SCNC: 140 MMOL/L (ref 136–145)
WBC # BLD AUTO: 9.9 10E3/UL (ref 4–11)

## 2022-01-08 PROCEDURE — 250N000013 HC RX MED GY IP 250 OP 250 PS 637: Performed by: HOSPITALIST

## 2022-01-08 PROCEDURE — 250N000011 HC RX IP 250 OP 636: Performed by: NURSE ANESTHETIST, CERTIFIED REGISTERED

## 2022-01-08 PROCEDURE — 120N000001 HC R&B MED SURG/OB

## 2022-01-08 PROCEDURE — 258N000003 HC RX IP 258 OP 636: Performed by: NURSE ANESTHETIST, CERTIFIED REGISTERED

## 2022-01-08 PROCEDURE — 250N000011 HC RX IP 250 OP 636: Performed by: ANESTHESIOLOGY

## 2022-01-08 PROCEDURE — 250N000011 HC RX IP 250 OP 636: Performed by: FAMILY MEDICINE

## 2022-01-08 PROCEDURE — 36415 COLL VENOUS BLD VENIPUNCTURE: CPT | Performed by: HOSPITALIST

## 2022-01-08 PROCEDURE — 99222 1ST HOSP IP/OBS MODERATE 55: CPT | Mod: 57 | Performed by: SURGERY

## 2022-01-08 PROCEDURE — 250N000013 HC RX MED GY IP 250 OP 250 PS 637: Performed by: FAMILY MEDICINE

## 2022-01-08 PROCEDURE — 370N000017 HC ANESTHESIA TECHNICAL FEE, PER MIN: Performed by: SURGERY

## 2022-01-08 PROCEDURE — 250N000009 HC RX 250: Performed by: NURSE ANESTHETIST, CERTIFIED REGISTERED

## 2022-01-08 PROCEDURE — 258N000003 HC RX IP 258 OP 636: Performed by: HOSPITALIST

## 2022-01-08 PROCEDURE — 360N000077 HC SURGERY LEVEL 4, PER MIN: Performed by: SURGERY

## 2022-01-08 PROCEDURE — 88307 TISSUE EXAM BY PATHOLOGIST: CPT | Mod: TC | Performed by: SURGERY

## 2022-01-08 PROCEDURE — 250N000011 HC RX IP 250 OP 636: Performed by: SURGERY

## 2022-01-08 PROCEDURE — 99233 SBSQ HOSP IP/OBS HIGH 50: CPT | Performed by: FAMILY MEDICINE

## 2022-01-08 PROCEDURE — 710N000010 HC RECOVERY PHASE 1, LEVEL 2, PER MIN: Performed by: SURGERY

## 2022-01-08 PROCEDURE — 0DNN4ZZ RELEASE SIGMOID COLON, PERCUTANEOUS ENDOSCOPIC APPROACH: ICD-10-PCS | Performed by: SURGERY

## 2022-01-08 PROCEDURE — 88307 TISSUE EXAM BY PATHOLOGIST: CPT | Mod: 26 | Performed by: PATHOLOGY

## 2022-01-08 PROCEDURE — 250N000011 HC RX IP 250 OP 636: Performed by: HOSPITALIST

## 2022-01-08 PROCEDURE — 94640 AIRWAY INHALATION TREATMENT: CPT

## 2022-01-08 PROCEDURE — 250N000009 HC RX 250: Performed by: ANESTHESIOLOGY

## 2022-01-08 PROCEDURE — 82310 ASSAY OF CALCIUM: CPT | Performed by: HOSPITALIST

## 2022-01-08 PROCEDURE — 258N000001 HC RX 258: Performed by: SURGERY

## 2022-01-08 PROCEDURE — 250N000013 HC RX MED GY IP 250 OP 250 PS 637: Performed by: SURGERY

## 2022-01-08 PROCEDURE — 0DNB4ZZ RELEASE ILEUM, PERCUTANEOUS ENDOSCOPIC APPROACH: ICD-10-PCS | Performed by: SURGERY

## 2022-01-08 PROCEDURE — P9041 ALBUMIN (HUMAN),5%, 50ML: HCPCS | Performed by: ANESTHESIOLOGY

## 2022-01-08 PROCEDURE — 44202 LAP ENTERECTOMY: CPT | Performed by: SURGERY

## 2022-01-08 PROCEDURE — 83735 ASSAY OF MAGNESIUM: CPT | Performed by: HOSPITALIST

## 2022-01-08 PROCEDURE — 85025 COMPLETE CBC W/AUTO DIFF WBC: CPT | Performed by: HOSPITALIST

## 2022-01-08 PROCEDURE — 999N000065 XR ABDOMEN PORT 1 VIEWS

## 2022-01-08 PROCEDURE — G0378 HOSPITAL OBSERVATION PER HR: HCPCS

## 2022-01-08 PROCEDURE — 0DBB4ZZ EXCISION OF ILEUM, PERCUTANEOUS ENDOSCOPIC APPROACH: ICD-10-PCS | Performed by: SURGERY

## 2022-01-08 PROCEDURE — 272N000001 HC OR GENERAL SUPPLY STERILE: Performed by: SURGERY

## 2022-01-08 RX ORDER — ACETAMINOPHEN 325 MG/1
975 TABLET ORAL EVERY 8 HOURS
Status: DISCONTINUED | OUTPATIENT
Start: 2022-01-08 | End: 2022-01-10 | Stop reason: HOSPADM

## 2022-01-08 RX ORDER — CEFAZOLIN SODIUM 2 G/100ML
2 INJECTION, SOLUTION INTRAVENOUS
Status: DISCONTINUED | OUTPATIENT
Start: 2022-01-08 | End: 2022-01-08 | Stop reason: HOSPADM

## 2022-01-08 RX ORDER — HYDROMORPHONE HCL IN WATER/PF 6 MG/30 ML
0.2 PATIENT CONTROLLED ANALGESIA SYRINGE INTRAVENOUS
Status: DISCONTINUED | OUTPATIENT
Start: 2022-01-08 | End: 2022-01-09

## 2022-01-08 RX ORDER — NALOXONE HYDROCHLORIDE 0.4 MG/ML
0.2 INJECTION, SOLUTION INTRAMUSCULAR; INTRAVENOUS; SUBCUTANEOUS
Status: DISCONTINUED | OUTPATIENT
Start: 2022-01-08 | End: 2022-01-10 | Stop reason: HOSPADM

## 2022-01-08 RX ORDER — ONDANSETRON 2 MG/ML
4 INJECTION INTRAMUSCULAR; INTRAVENOUS EVERY 6 HOURS PRN
Status: DISCONTINUED | OUTPATIENT
Start: 2022-01-08 | End: 2022-01-10 | Stop reason: HOSPADM

## 2022-01-08 RX ORDER — LIDOCAINE 40 MG/G
CREAM TOPICAL
Status: DISCONTINUED | OUTPATIENT
Start: 2022-01-08 | End: 2022-01-10 | Stop reason: HOSPADM

## 2022-01-08 RX ORDER — CEFAZOLIN SODIUM 2 G/100ML
2 INJECTION, SOLUTION INTRAVENOUS SEE ADMIN INSTRUCTIONS
Status: DISCONTINUED | OUTPATIENT
Start: 2022-01-08 | End: 2022-01-08 | Stop reason: HOSPADM

## 2022-01-08 RX ORDER — DEXAMETHASONE SODIUM PHOSPHATE 10 MG/ML
INJECTION, SOLUTION INTRAMUSCULAR; INTRAVENOUS PRN
Status: DISCONTINUED | OUTPATIENT
Start: 2022-01-08 | End: 2022-01-08

## 2022-01-08 RX ORDER — TRAMADOL HYDROCHLORIDE 50 MG/1
50 TABLET ORAL EVERY 6 HOURS PRN
Status: DISCONTINUED | OUTPATIENT
Start: 2022-01-08 | End: 2022-01-10 | Stop reason: HOSPADM

## 2022-01-08 RX ORDER — NALOXONE HYDROCHLORIDE 0.4 MG/ML
0.4 INJECTION, SOLUTION INTRAMUSCULAR; INTRAVENOUS; SUBCUTANEOUS
Status: DISCONTINUED | OUTPATIENT
Start: 2022-01-08 | End: 2022-01-10 | Stop reason: HOSPADM

## 2022-01-08 RX ORDER — FENTANYL CITRATE 50 UG/ML
INJECTION, SOLUTION INTRAMUSCULAR; INTRAVENOUS PRN
Status: DISCONTINUED | OUTPATIENT
Start: 2022-01-08 | End: 2022-01-08

## 2022-01-08 RX ORDER — KETOROLAC TROMETHAMINE 30 MG/ML
INJECTION, SOLUTION INTRAMUSCULAR; INTRAVENOUS PRN
Status: DISCONTINUED | OUTPATIENT
Start: 2022-01-08 | End: 2022-01-08

## 2022-01-08 RX ORDER — ONDANSETRON 4 MG/1
4 TABLET, ORALLY DISINTEGRATING ORAL EVERY 6 HOURS PRN
Status: DISCONTINUED | OUTPATIENT
Start: 2022-01-08 | End: 2022-01-10 | Stop reason: HOSPADM

## 2022-01-08 RX ORDER — SODIUM CHLORIDE, SODIUM LACTATE, POTASSIUM CHLORIDE, CALCIUM CHLORIDE 600; 310; 30; 20 MG/100ML; MG/100ML; MG/100ML; MG/100ML
INJECTION, SOLUTION INTRAVENOUS CONTINUOUS PRN
Status: DISCONTINUED | OUTPATIENT
Start: 2022-01-08 | End: 2022-01-08

## 2022-01-08 RX ORDER — PROPOFOL 10 MG/ML
INJECTION, EMULSION INTRAVENOUS CONTINUOUS PRN
Status: DISCONTINUED | OUTPATIENT
Start: 2022-01-08 | End: 2022-01-08

## 2022-01-08 RX ORDER — SODIUM CHLORIDE AND POTASSIUM CHLORIDE 150; 900 MG/100ML; MG/100ML
INJECTION, SOLUTION INTRAVENOUS CONTINUOUS
Status: DISCONTINUED | OUTPATIENT
Start: 2022-01-08 | End: 2022-01-08

## 2022-01-08 RX ORDER — HYDROMORPHONE HCL IN WATER/PF 6 MG/30 ML
0.4 PATIENT CONTROLLED ANALGESIA SYRINGE INTRAVENOUS
Status: DISCONTINUED | OUTPATIENT
Start: 2022-01-08 | End: 2022-01-09

## 2022-01-08 RX ORDER — ONDANSETRON 2 MG/ML
INJECTION INTRAMUSCULAR; INTRAVENOUS PRN
Status: DISCONTINUED | OUTPATIENT
Start: 2022-01-08 | End: 2022-01-08

## 2022-01-08 RX ORDER — GABAPENTIN 100 MG/1
100 CAPSULE ORAL AT BEDTIME
Status: DISCONTINUED | OUTPATIENT
Start: 2022-01-08 | End: 2022-01-10 | Stop reason: HOSPADM

## 2022-01-08 RX ORDER — DEXTROSE MONOHYDRATE, SODIUM CHLORIDE, AND POTASSIUM CHLORIDE 50; 1.49; 9 G/1000ML; G/1000ML; G/1000ML
INJECTION, SOLUTION INTRAVENOUS CONTINUOUS
Status: DISCONTINUED | OUTPATIENT
Start: 2022-01-08 | End: 2022-01-10

## 2022-01-08 RX ORDER — BUPIVACAINE HYDROCHLORIDE 2.5 MG/ML
INJECTION, SOLUTION EPIDURAL; INFILTRATION; INTRACAUDAL PRN
Status: DISCONTINUED | OUTPATIENT
Start: 2022-01-08 | End: 2022-01-08 | Stop reason: HOSPADM

## 2022-01-08 RX ORDER — ACETAMINOPHEN 325 MG/1
650 TABLET ORAL EVERY 4 HOURS PRN
Status: DISCONTINUED | OUTPATIENT
Start: 2022-01-11 | End: 2022-01-10 | Stop reason: HOSPADM

## 2022-01-08 RX ORDER — LIDOCAINE HYDROCHLORIDE 10 MG/ML
INJECTION, SOLUTION INFILTRATION; PERINEURAL PRN
Status: DISCONTINUED | OUTPATIENT
Start: 2022-01-08 | End: 2022-01-08

## 2022-01-08 RX ORDER — KETAMINE HYDROCHLORIDE 10 MG/ML
INJECTION INTRAMUSCULAR; INTRAVENOUS PRN
Status: DISCONTINUED | OUTPATIENT
Start: 2022-01-08 | End: 2022-01-08

## 2022-01-08 RX ORDER — ALBUMIN, HUMAN INJ 5% 5 %
SOLUTION INTRAVENOUS CONTINUOUS PRN
Status: DISCONTINUED | OUTPATIENT
Start: 2022-01-08 | End: 2022-01-08

## 2022-01-08 RX ORDER — PROPOFOL 10 MG/ML
INJECTION, EMULSION INTRAVENOUS PRN
Status: DISCONTINUED | OUTPATIENT
Start: 2022-01-08 | End: 2022-01-08

## 2022-01-08 RX ORDER — SODIUM CHLORIDE, SODIUM LACTATE, POTASSIUM CHLORIDE, AND CALCIUM CHLORIDE .6; .31; .03; .02 G/100ML; G/100ML; G/100ML; G/100ML
IRRIGANT IRRIGATION PRN
Status: DISCONTINUED | OUTPATIENT
Start: 2022-01-08 | End: 2022-01-08 | Stop reason: HOSPADM

## 2022-01-08 RX ORDER — GABAPENTIN 100 MG/1
100 CAPSULE ORAL ONCE
Status: COMPLETED | OUTPATIENT
Start: 2022-01-08 | End: 2022-01-08

## 2022-01-08 RX ADMIN — PHENYLEPHRINE HYDROCHLORIDE 50 MCG: 10 INJECTION INTRAVENOUS at 13:04

## 2022-01-08 RX ADMIN — LIDOCAINE HYDROCHLORIDE 5 ML: 10 INJECTION, SOLUTION INFILTRATION; PERINEURAL at 13:04

## 2022-01-08 RX ADMIN — GABAPENTIN 100 MG: 100 CAPSULE ORAL at 10:45

## 2022-01-08 RX ADMIN — PROPOFOL 150 MG: 10 INJECTION, EMULSION INTRAVENOUS at 13:06

## 2022-01-08 RX ADMIN — Medication 100 MG: at 13:06

## 2022-01-08 RX ADMIN — KETAMINE HYDROCHLORIDE 50 MG: 10 INJECTION, SOLUTION INTRAMUSCULAR; INTRAVENOUS at 13:17

## 2022-01-08 RX ADMIN — DEXAMETHASONE SODIUM PHOSPHATE 10 MG: 10 INJECTION, SOLUTION INTRAMUSCULAR; INTRAVENOUS at 13:22

## 2022-01-08 RX ADMIN — KETOROLAC TROMETHAMINE 15 MG: 30 INJECTION, SOLUTION INTRAMUSCULAR at 14:37

## 2022-01-08 RX ADMIN — BENZOCAINE AND MENTHOL 1 LOZENGE: 15; 3.6 LOZENGE ORAL at 11:11

## 2022-01-08 RX ADMIN — POTASSIUM CHLORIDE, DEXTROSE MONOHYDRATE AND SODIUM CHLORIDE: 150; 5; 900 INJECTION, SOLUTION INTRAVENOUS at 17:40

## 2022-01-08 RX ADMIN — ACETAMINOPHEN 975 MG: 325 TABLET ORAL at 17:38

## 2022-01-08 RX ADMIN — PROPOFOL 150 MCG/KG/MIN: 10 INJECTION, EMULSION INTRAVENOUS at 13:07

## 2022-01-08 RX ADMIN — FENTANYL CITRATE 50 MCG: 50 INJECTION, SOLUTION INTRAMUSCULAR; INTRAVENOUS at 13:04

## 2022-01-08 RX ADMIN — PIPERACILLIN AND TAZOBACTAM 3.38 G: 3; .375 INJECTION, POWDER, LYOPHILIZED, FOR SOLUTION INTRAVENOUS at 01:47

## 2022-01-08 RX ADMIN — SUGAMMADEX 350 MG: 100 INJECTION, SOLUTION INTRAVENOUS at 14:31

## 2022-01-08 RX ADMIN — PIPERACILLIN AND TAZOBACTAM 3.38 G: 3; .375 INJECTION, POWDER, LYOPHILIZED, FOR SOLUTION INTRAVENOUS at 06:33

## 2022-01-08 RX ADMIN — FLUTICASONE FUROATE AND VILANTEROL TRIFENATATE 1 PUFF: 200; 25 POWDER RESPIRATORY (INHALATION) at 10:24

## 2022-01-08 RX ADMIN — POTASSIUM CHLORIDE AND SODIUM CHLORIDE: 900; 150 INJECTION, SOLUTION INTRAVENOUS at 10:16

## 2022-01-08 RX ADMIN — FLUTICASONE PROPIONATE 1 SPRAY: 50 SPRAY, METERED NASAL at 10:45

## 2022-01-08 RX ADMIN — ROCURONIUM BROMIDE 40 MG: 10 INJECTION, SOLUTION INTRAVENOUS at 13:17

## 2022-01-08 RX ADMIN — PIPERACILLIN AND TAZOBACTAM 3.38 G: 3; .375 INJECTION, POWDER, LYOPHILIZED, FOR SOLUTION INTRAVENOUS at 21:03

## 2022-01-08 RX ADMIN — SUGAMMADEX 50 MG: 100 INJECTION, SOLUTION INTRAVENOUS at 14:46

## 2022-01-08 RX ADMIN — PHENYLEPHRINE HYDROCHLORIDE 50 MCG: 10 INJECTION INTRAVENOUS at 13:07

## 2022-01-08 RX ADMIN — GABAPENTIN 100 MG: 100 CAPSULE ORAL at 01:46

## 2022-01-08 RX ADMIN — PHENYLEPHRINE HYDROCHLORIDE 100 MCG: 10 INJECTION INTRAVENOUS at 14:01

## 2022-01-08 RX ADMIN — GABAPENTIN 100 MG: 100 CAPSULE ORAL at 21:01

## 2022-01-08 RX ADMIN — PIPERACILLIN AND TAZOBACTAM 3.38 G: 3; .375 INJECTION, POWDER, LYOPHILIZED, FOR SOLUTION INTRAVENOUS at 13:35

## 2022-01-08 RX ADMIN — ONDANSETRON 4 MG: 2 INJECTION INTRAMUSCULAR; INTRAVENOUS at 13:22

## 2022-01-08 RX ADMIN — UMECLIDINIUM 1 PUFF: 62.5 AEROSOL, POWDER ORAL at 10:24

## 2022-01-08 RX ADMIN — FENTANYL CITRATE 50 MCG: 50 INJECTION, SOLUTION INTRAMUSCULAR; INTRAVENOUS at 13:24

## 2022-01-08 RX ADMIN — HYDROMORPHONE HYDROCHLORIDE 0.5 MG: 1 INJECTION, SOLUTION INTRAMUSCULAR; INTRAVENOUS; SUBCUTANEOUS at 13:47

## 2022-01-08 RX ADMIN — SODIUM CHLORIDE, POTASSIUM CHLORIDE, SODIUM LACTATE AND CALCIUM CHLORIDE: 600; 310; 30; 20 INJECTION, SOLUTION INTRAVENOUS at 01:46

## 2022-01-08 RX ADMIN — PANTOPRAZOLE SODIUM 40 MG: 20 TABLET, DELAYED RELEASE ORAL at 10:23

## 2022-01-08 RX ADMIN — ALBUMIN HUMAN: 0.05 INJECTION, SOLUTION INTRAVENOUS at 14:05

## 2022-01-08 RX ADMIN — SODIUM CHLORIDE, POTASSIUM CHLORIDE, SODIUM LACTATE AND CALCIUM CHLORIDE: 600; 310; 30; 20 INJECTION, SOLUTION INTRAVENOUS at 13:00

## 2022-01-08 ASSESSMENT — ACTIVITIES OF DAILY LIVING (ADL)
ADLS_ACUITY_SCORE: 7
ADLS_ACUITY_SCORE: 7
DIFFICULTY_EATING/SWALLOWING: NO
ADLS_ACUITY_SCORE: 7
TOILETING_ISSUES: NO
DIFFICULTY_COMMUNICATING: NO
DEPENDENT_IADLS:: INDEPENDENT
ADLS_ACUITY_SCORE: 7
DRESSING/BATHING_DIFFICULTY: NO

## 2022-01-08 ASSESSMENT — COPD QUESTIONNAIRES: COPD: 1

## 2022-01-08 NOTE — PLAN OF CARE
Pt states that she has some stomach tenderness but no actual pain. Received Fentanyl x1 which was effective and allowing pt to rest. NG tube placed at LIS, need to reassess placement.        Problem: Pain Acute  Goal: Acceptable Pain Control and Functional Ability  Outcome: Change based on patient need/priority

## 2022-01-08 NOTE — PLAN OF CARE
Patient placed on 2 L of O2 saturations at 86%. Patient stated she is on 2 L of o2 at home. Did decrease her to 1L saturations currently at 92%.

## 2022-01-08 NOTE — ANESTHESIA CARE TRANSFER NOTE
Patient: Blessing Castellanos    Procedure: Procedure(s):  LAPAROSCOPY, LYSIS OF ADHESIONS,  LAPAROSCOPIC SMALL BOWEL RESECTION       Diagnosis: SBO (small bowel obstruction) (H) [K56.609]  Diagnosis Additional Information: No value filed.    Anesthesia Type:   General     Note:    Oropharynx: oropharynx clear of all foreign objects and spontaneously breathing  Level of Consciousness: drowsy  Oxygen Supplementation: face mask  Level of Supplemental Oxygen (L/min / FiO2): 6  Independent Airway: airway patency satisfactory and stable    Vital Signs Stable: post-procedure vital signs reviewed and stable  Report to RN Given: handoff report given  Patient transferred to: PACU    Handoff Report: Identifed the Patient, Identified the Reponsible Provider, Reviewed the pertinent medical history, Discussed the surgical course, Reviewed Intra-OP anesthesia mangement and issues during anesthesia, Set expectations for post-procedure period and Allowed opportunity for questions and acknowledgement of understanding      Vitals:  Vitals Value Taken Time   /70 01/08/22 1452   Temp 36.6  C (97.8  F) 01/08/22 1451   Pulse 71 01/08/22 1453   Resp 23 01/08/22 1453   SpO2 100 % 01/08/22 1453   Vitals shown include unvalidated device data.    Electronically Signed By: POPPY Merritt CRNA  January 8, 2022  2:55 PM

## 2022-01-08 NOTE — ANESTHESIA POSTPROCEDURE EVALUATION
Patient: Blessing Castellanos    Procedure: Procedure(s):  LAPAROSCOPY, LYSIS OF ADHESIONS,  LAPAROSCOPIC SMALL BOWEL RESECTION       Diagnosis:SBO (small bowel obstruction) (H) [K56.609]  Diagnosis Additional Information: No value filed.    Anesthesia Type:  General    Note:  Disposition: Inpatient   Postop Pain Control: Uneventful            Sign Out: Well controlled pain   PONV: No   Neuro/Psych: Uneventful            Sign Out: Acceptable/Baseline neuro status   Airway/Respiratory: Uneventful            Sign Out: Acceptable/Baseline resp. status   CV/Hemodynamics: Uneventful            Sign Out: Acceptable CV status; No obvious hypovolemia; No obvious fluid overload   Other NRE: NONE   DID A NON-ROUTINE EVENT OCCUR? No           Last vitals:  Vitals Value Taken Time   /68 01/08/22 1528   Temp 36.6  C (97.9  F) 01/08/22 1528   Pulse 71 01/08/22 1528   Resp 14 01/08/22 1528   SpO2 96 % 01/08/22 1528       Electronically Signed By: VILLA WEST MD  January 8, 2022  3:38 PM

## 2022-01-08 NOTE — H&P
[facility] [place] MEDICINE ADMISSION HISTORY AND PHYSICAL     Assessment & Plan      Blessing Castellanos is a 81 year old old female recently hospitalized for diverticulitis with questionable small bowel obstruction.  During her recent hospital stay she was seen by gastroenterology and general surgery.  She was treated with antibiotics for diverticulitis and her symptoms improved.  She had a Gastrografin small bowel follow-through at that time that did not show any obstruction.  She was discharged to home on 1/4 to continue oral antibiotics at home.  She presented to the hospital again today with return of symptoms including abdominal pain and vomiting.  Imaging in the emergency department today showed persistent low-grade distal small bowel obstruction with focal caliber transition of the distal ileum in the right pelvis.  NG tube was placed, general surgery consulted, treated with Zosyn.    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p.o.  Pain tx: IV Dilaudid  PT/OT: None      Chief Complaint  abdominal pain and vomiting     HISTORY     Blessing Castellanos is a 81 year old old female recently hospitalized for diverticulitis with questionable small bowel obstruction.  During her recent  hospital stay she was seen by gastroenterology and general surgery.  She was treated with antibiotics for diverticulitis and her symptoms improved.  She had a Gastrografin small bowel follow-through at that time that did not show any obstruction.  She was discharged to home on 1/4 to continue oral antibiotics at home.  She presented to the hospital again today with return of symptoms including abdominal pain and vomiting.  Imaging in the emergency department today showed persistent low-grade distal small bowel obstruction with focal caliber transition of the distal ileum in the right pelvis.  NG tube was placed, general surgery consulted, treated with Zosyn.    No chest pain, dyspnea. At the time of my encounter her abdominal pain and nausea are improved. Symptoms started yesterday with cramping pain, vomiting. No melena or hematochezia. Did have a bowel movement earlier today.     Treatment in ED: Fentanyl, magnesium, Zofran    Past Medical History   Past Medical History:  No date: Bell's palsy  No date: COPD (chronic obstructive pulmonary disease) (H)  No date: GERD (gastroesophageal reflux disease)  No date: Hypercholesteremia  No date: Nephrolithiasis  No date: Osteoarthritis  No date: PONV (postoperative nausea and vomiting)  No date: Raynaud's disease  No date: Restless leg  No date: Spinal stenosis   Surgical History     Past Surgical History:   Procedure Laterality Date     APPENDECTOMY       BACK SURGERY       CATARACT EXTRACTION       CHOLECYSTECTOMY       CYSTOCELE REPAIR       HYSTERECTOMY       JOINT REPLACEMENT Bilateral     shoulder     OTHER SURGICAL HISTORY      kidney stones     TONSILLECTOMY       TOTAL HIP ARTHROPLASTY Left 5/28/2015    Procedure: LEFT HIP TOTAL ARTHROPLASTY;  Surgeon: Chay Moran MD;  Location: [facility];  Service:      TUBAL LIGATION       VEIN LIGATION AND STRIPPING       Family History    Reviewed, and   Family History   Problem Relation Age of Onset     Heart  Disease Maternal Grandfather       Social History      Social History     Tobacco Use     Smoking status: Former Smoker     Quit date: 1991     Years since quittin.6     Smokeless tobacco: Never Used   Substance Use Topics     Alcohol use: No     Comment: Alcoholic Drinks/day: rare     Drug use: No      Allergies     Allergies   Allergen Reactions     Doxycycline Calcium [Doxycycline] Hives     Mirapex [Pramipexole] Hives     Prilosec [Omeprazole] Hives     Sulfa (Sulfonamide Antibiotics) [Sulfa Drugs] Other (See Comments)     GI Upset     Zithromax [Azithromycin] Unknown     Listed on H&P     Antihistamines - Alkylamine [Alkylamines] Anxiety     Prior to Admission Medications      Prior to Admission Medications   Prescriptions Last Dose Informant Patient Reported? Taking?   albuterol (PROVENTIL HFA;VENTOLIN HFA) 90 mcg/actuation inhaler   Yes No   Sig: [ALBUTEROL (PROVENTIL HFA;VENTOLIN HFA) 90 MCG/ACTUATION INHALER] Inhale 1-2 puffs every 4 (four) hours as needed for wheezing or shortness of breath.   carboxymethylcellulose PF (REFRESH LIQUIGEL) 1 % ophthalmic gel   Yes No   Sig: Place 1 drop into both eyes 3 times daily as needed for dry eyes   cholecalciferol, vitamin D3, 2,000 unit Tab   Yes No   Sig: [CHOLECALCIFEROL, VITAMIN D3, 2,000 UNIT TAB] Take 2,000 Units by mouth once daily.    ciprofloxacin (CIPRO) 500 MG tablet   No No   Sig: Take 1 tablet (500 mg) by mouth 2 times daily for 5 days   fluticasone (FLONASE) 50 MCG/ACT nasal spray   No No   Sig: Spray 1 spray into both nostrils daily   gabapentin (NEURONTIN) 100 MG capsule   Yes No   Sig: Take 100 mg by mouth At Bedtime    lansoprazole (PREVACID) 30 MG capsule   Yes No   Sig: Take 30 mg by mouth every morning (before breakfast)    metroNIDAZOLE (FLAGYL) 500 MG tablet   No No   Sig: Take 1 tablet (500 mg) by mouth 3 times daily for 5 days   mometasone-formoterol (DULERA) 200-5 mcg/actuation HFAA inhaler   Yes No   Sig: Inhale 2 puffs into the  lungs 2 times daily    polyethylene glycol (MIRALAX) 17 g packet   Yes No   Sig: Take 17 g by mouth At Bedtime   umeclidinium (INCRUSE ELLIPTA) 62.5 MCG/INH inhaler   Yes No   Sig: Inhale 1 puff into the lungs daily      Facility-Administered Medications: None      Review of Systems   A 12 point comprehensive review of systems was negative except as noted above in HPI.  PHYSICAL EXAMINATION     Vitals    Temp:  [99.2  F (37.3  C)] 99.2  F (37.3  C)  Pulse:  [97] 97  Resp:  [18] 18  BP: (125)/(57) 125/57  SpO2:  [91 %-95 %] 95 %  Examination   Physical Exam:    Gen: no acute distress, comfortable, alert, pleasant  ENT: no scleral icterus  Pulm: lungs are diminished but clear, breathing comfortably with oxygen via nasal cannula.  CV: regular rate and rhythm  GI: abdomen is soft, non-tender, non-distended with active bowel sounds.  MSK: no significant peripheral pitting edema  Derm: no rashes on examined areas of skin, no jaundice, skin is dry and warm.   Psych: appropriate affect    Carl Velazquez DO  Hospital Medicine  [facility]   Phone: #[phone]

## 2022-01-08 NOTE — ANESTHESIA PREPROCEDURE EVALUATION
Anesthesia Pre-Procedure Evaluation    Patient: Blessing Castellanos   MRN: [id number] : 1940        Preoperative Diagnosis: SBO (small bowel obstruction) (H) [K56.609]    Procedure : Procedure(s):  LAPAROSCOPY, LYSIS OF ADHESIONS          Past Medical History:   Diagnosis Date     Bell's palsy      COPD (chronic obstructive pulmonary disease) (H)      GERD (gastroesophageal reflux disease)      Hypercholesteremia      Nephrolithiasis      Osteoarthritis      PONV (postoperative nausea and vomiting)      Raynaud's disease      Restless leg      Spinal stenosis       Past Surgical History:   Procedure Laterality Date     APPENDECTOMY       BACK SURGERY       CATARACT EXTRACTION       CHOLECYSTECTOMY       CYSTOCELE REPAIR       HYSTERECTOMY       JOINT REPLACEMENT Bilateral     shoulder     OTHER SURGICAL HISTORY      kidney stones     TONSILLECTOMY       TOTAL HIP ARTHROPLASTY Left 2015    Procedure: LEFT HIP TOTAL ARTHROPLASTY;  Surgeon: Chay Moran MD;  Location: [facility];  Service:      TUBAL LIGATION       VEIN LIGATION AND STRIPPING        Allergies   Allergen Reactions     Doxycycline Calcium [Doxycycline] Hives     Mirapex [Pramipexole] Hives     Prilosec [Omeprazole] Hives     Sulfa (Sulfonamide Antibiotics) [Sulfa Drugs] Other (See Comments)     GI Upset     Zithromax [Azithromycin] Unknown     Listed on H&P     Antihistamines - Alkylamine [Alkylamines] Anxiety      Social History     Tobacco Use     Smoking status: Former Smoker     Quit date: 1991     Years since quittin.6     Smokeless tobacco: Never Used   Substance Use Topics     Alcohol use: No     Comment: Alcoholic Drinks/day: rare      Wt Readings from Last 1 Encounters:   22 81.6 kg (180 lb)        Anesthesia Evaluation   Pt has had prior anesthetic.     History of anesthetic complications  - PONV.      ROS/MED HX  ENT/Pulmonary:     (+) COPD,     Neurologic:       Cardiovascular:       METS/Exercise  Tolerance:     Hematologic:       Musculoskeletal:       GI/Hepatic: Comment: SBO 2/2 adhesions      (+) GERD,     Renal/Genitourinary:     (+) renal disease,     Endo:       Psychiatric/Substance Use:       Infectious Disease:       Malignancy:       Other:            Physical Exam    Airway        Mallampati: II   TM distance: > 3 FB   Neck ROM: full     Respiratory Devices and Support         Dental       (+) upper dentures and lower dentures      Cardiovascular   cardiovascular exam normal          Pulmonary                   OUTSIDE LABS:  CBC:   Lab Results   Component Value Date    WBC 9.9 01/08/2022    WBC 13.2 (H) 01/07/2022    HGB 13.3 01/08/2022    HGB 14.6 01/07/2022    HCT 41.9 01/08/2022    HCT 45.8 01/07/2022     01/08/2022     01/07/2022     BMP:   Lab Results   Component Value Date     01/08/2022     01/07/2022    POTASSIUM 3.5 01/08/2022    POTASSIUM 3.5 01/07/2022    CHLORIDE 104 01/08/2022    CHLORIDE 103 01/07/2022    CO2 28 01/08/2022    CO2 21 (L) 01/07/2022    BUN 11 01/08/2022    BUN 11 01/07/2022    CR 0.73 01/08/2022    CR 0.75 01/07/2022     01/08/2022     01/07/2022     COAGS: No results found for: PTT, INR, FIBR  POC: No results found for: BGM, HCG, HCGS  HEPATIC:   Lab Results   Component Value Date    ALBUMIN 3.8 01/02/2022    PROTTOTAL 8.2 (H) 01/02/2022    ALT 14 01/02/2022    AST 24 01/02/2022    ALKPHOS 91 01/02/2022    BILITOTAL 0.8 01/02/2022     OTHER:   Lab Results   Component Value Date    LACT 1.2 01/02/2022    HANG 8.4 (L) 01/08/2022    MAG 2.0 01/08/2022    LIPASE 10 01/02/2022       Anesthesia Plan    ASA Status:  3, emergent       Anesthesia Type: General.     - Airway: ETT   Induction: Intravenous, RSI.   Maintenance: TIVA.   Techniques and Equipment:     - Airway: Video-Laryngoscope         Consents    Anesthesia Plan(s) and associated risks, benefits, and realistic alternatives discussed. Questions answered and  patient/representative(s) expressed understanding.     - Discussed: Risks, Benefits and Alternatives for BOTH SEDATION and the PROCEDURE were discussed     - Discussed with:  Patient         Postoperative Care    Pain management: IV analgesics, Multi-modal analgesia.        Comments:    Other Comments: Plan for GETA, RSI, TIVA with propofol due to hx of PONV  Will decompress stomach using existing NG tube            VILLA WEST MD

## 2022-01-08 NOTE — ADDENDUM NOTE
Addendum  created 01/08/22 1617 by Arianna Babcock APRN CRNA    Intraprocedure Event edited, Intraprocedure Staff edited

## 2022-01-08 NOTE — PHARMACY-ADMISSION MEDICATION HISTORY
Pharmacy Note - Admission Medication History    Pertinent Provider Information: Spoke with patient who states that nothing has changed on her med list since she discharged from [facility] on 1/4/22.     Patient was discharged with a 5 day course of ciprofloxacin and metronidazole (last doses for both would be 1/9/22).    Patient states that she only uses 1 puff of her Dulera inhaler BID. Appears it was prescribed as 2 puff BID.      ______________________________________________________________________    Prior To Admission (PTA) med list completed and updated in EMR.       PTA Med List   Medication Sig Last Dose     albuterol (PROVENTIL HFA;VENTOLIN HFA) 90 mcg/actuation inhaler [ALBUTEROL (PROVENTIL HFA;VENTOLIN HFA) 90 MCG/ACTUATION INHALER] Inhale 1-2 puffs every 4 (four) hours as needed for wheezing or shortness of breath. 1/7/2022 at Unknown time     carboxymethylcellulose PF (REFRESH LIQUIGEL) 1 % ophthalmic gel Place 1 drop into both eyes 3 times daily as needed for dry eyes 1/7/2022 at Unknown time     cholecalciferol, vitamin D3, 2,000 unit Tab [CHOLECALCIFEROL, VITAMIN D3, 2,000 UNIT TAB] Take 2,000 Units by mouth once daily.  1/6/2022     ciprofloxacin (CIPRO) 500 MG tablet Take 1 tablet (500 mg) by mouth 2 times daily for 5 days 1/7/2022 at got AM dose only     fluticasone (FLONASE) 50 MCG/ACT nasal spray Spray 1 spray into both nostrils daily (Patient taking differently: Spray 1 spray into both nostrils daily as needed for allergies ) Past Week at Unknown time     gabapentin (NEURONTIN) 100 MG capsule Take 100 mg by mouth At Bedtime  1/6/2022 at HS     lansoprazole (PREVACID) 30 MG capsule Take 30 mg by mouth every morning (before breakfast)  1/6/2022     metroNIDAZOLE (FLAGYL) 500 MG tablet Take 1 tablet (500 mg) by mouth 3 times daily for 5 days 1/7/2022 at 2 doses yesterday     mometasone-formoterol (DULERA) 200-5 mcg/actuation HFAA inhaler Inhale 1 puff into the lungs 2 times daily  1/6/2022      polyethylene glycol (MIRALAX) 17 g packet Take 17 g by mouth At Bedtime 1/6/2022 at HS     umeclidinium (INCRUSE ELLIPTA) 62.5 MCG/INH inhaler Inhale 1 puff into the lungs daily 1/6/2022       Information source(s): Patient and CareEverywhere/SureScripts  Method of interview communication: in-person    Summary of Changes to PTA Med List  New: ciprofloxacin and metronidazole  Discontinued: none  Changed: Dulera instructions updated to reflect how patient is taking at home    Patient was asked about OTC/herbal products specifically.  PTA med list reflects this.    In the past week, patient estimated taking medication this percent of the time:  50-90% due to other.    Allergies were reviewed, assessed, and updated with the patient.      Patient did not bring any medications to the hospital and can't retrieve from home. No multi-dose medications are available for use during hospital stay.     The information provided in this note is only as accurate as the sources available at the time of the update(s).    Thank you for the opportunity to participate in the care of this patient.    Carol Suarez  1/8/2022 8:12 AM

## 2022-01-08 NOTE — CONSULTS
General Surgery Consultation  Blessing Castellanos MRN# [id number]   Age/Sex: 81 year old female YOB: 1940     Reason for consult: 1. Generalized abdominal pain    2. Nausea and vomiting, intractability of vomiting not specified, unspecified vomiting type    3. Hypomagnesemia    4. SBO (small bowel obstruction) (H)            Requesting physician: Real Chawla MD                   Assessment and Plan:   Assessment:  Recurrent small bowel obstruction secondary to adhesions.  With this being her 2nd hospitalization in 1 week, and 3rd in 3 months I think she is reaching the point where surgical adhesiolysis is warranted to prevent further episodes.  I think we can relieve the small bowel obstruction without addressing the sigmoid colon which would be my preference in the situation.  I discussed the risk and benefits with her of surgery, including her anticipated perioperative course and she wishes to proceed.    Plan:  1.  Laparoscopic lysis of adhesions          Chief Complaint:     Chief Complaint   Patient presents with     Nausea, Vomiting, & Diarrhea        History is obtained from the patient    HPI:   Blessing Castellanos is a 81 year old female who presents to [facility] emergency department with a recurrent small bowel obstruction.  She had been hospitalized earlier this week with the same issue, that resolved with conservative management.  She notes however that she continued to have cramping abdominal discomfort even after discharge, and began having frequent nausea and vomiting yesterday which prompted her readmission through the emergency department.    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          Past Medical History:     Past Medical History:   Diagnosis Date     Bell's palsy      COPD (chronic obstructive pulmonary disease) (H)      GERD (gastroesophageal reflux disease)      Hypercholesteremia      Nephrolithiasis      Osteoarthritis       PONV (postoperative nausea and vomiting)      Raynaud's disease      Restless leg      Spinal stenosis               Past Surgical History:     Past Surgical History:   Procedure Laterality Date     APPENDECTOMY       BACK SURGERY       CATARACT EXTRACTION       CHOLECYSTECTOMY       CYSTOCELE REPAIR       HYSTERECTOMY       JOINT REPLACEMENT Bilateral     shoulder     OTHER SURGICAL HISTORY      kidney stones     TONSILLECTOMY       TOTAL HIP ARTHROPLASTY Left 5/28/2015    Procedure: LEFT HIP TOTAL ARTHROPLASTY;  Surgeon: Chay Moran MD;  Location: [facility];  Service:      TUBAL LIGATION       VEIN LIGATION AND STRIPPING               Social History:    reports that she quit smoking about 30 years ago. She has never used smokeless tobacco. She reports that she does not drink alcohol and does not use drugs.           Family History:     Family History   Problem Relation Age of Onset     Heart Disease Maternal Grandfather               Allergies:     Allergies   Allergen Reactions     Doxycycline Calcium [Doxycycline] Hives     Mirapex [Pramipexole] Hives     Prilosec [Omeprazole] Hives     Sulfa (Sulfonamide Antibiotics) [Sulfa Drugs] Other (See Comments)     GI Upset     Zithromax [Azithromycin] Unknown     Listed on H&P     Antihistamines - Alkylamine [Alkylamines] Anxiety              Medications:     Prior to Admission medications    Medication Sig Start Date End Date Taking? Authorizing Provider   albuterol (PROVENTIL HFA;VENTOLIN HFA) 90 mcg/actuation inhaler [ALBUTEROL (PROVENTIL HFA;VENTOLIN HFA) 90 MCG/ACTUATION INHALER] Inhale 1-2 puffs every 4 (four) hours as needed for wheezing or shortness of breath. 5/28/15  Yes Provider, Historical   carboxymethylcellulose PF (REFRESH LIQUIGEL) 1 % ophthalmic gel Place 1 drop into both eyes 3 times daily as needed for dry eyes   Yes Unknown, Entered By History   cholecalciferol, vitamin D3, 2,000 unit Tab [CHOLECALCIFEROL, VITAMIN D3, 2,000 UNIT TAB] Take  2,000 Units by mouth once daily.  5/22/15  Yes Provider, Historical   ciprofloxacin (CIPRO) 500 MG tablet Take 1 tablet (500 mg) by mouth 2 times daily for 5 days 1/5/22 1/10/22 Yes Carl Velazquez, DO   fluticasone (FLONASE) 50 MCG/ACT nasal spray Spray 1 spray into both nostrils daily  Patient taking differently: Spray 1 spray into both nostrils daily as needed for allergies  9/17/21  Yes Mauro Tang MD   gabapentin (NEURONTIN) 100 MG capsule Take 100 mg by mouth At Bedtime  5/22/15  Yes Provider, Historical   lansoprazole (PREVACID) 30 MG capsule Take 30 mg by mouth every morning (before breakfast)  5/28/15  Yes Provider, Historical   metroNIDAZOLE (FLAGYL) 500 MG tablet Take 1 tablet (500 mg) by mouth 3 times daily for 5 days 1/5/22 1/10/22 Yes Carl Velazquez, DO   mometasone-formoterol (DULERA) 200-5 mcg/actuation HFAA inhaler Inhale 1 puff into the lungs 2 times daily  5/28/15  Yes Provider, Historical   polyethylene glycol (MIRALAX) 17 g packet Take 17 g by mouth At Bedtime   Yes Unknown, Entered By History   umeclidinium (INCRUSE ELLIPTA) 62.5 MCG/INH inhaler Inhale 1 puff into the lungs daily   Yes Unknown, Entered By History              Review of Systems:   The Review of Systems is negative other than noted in the HPI            Physical Exam:     Patient Vitals for the past 24 hrs:   BP Temp Temp src Pulse Resp SpO2 Weight   01/08/22 0740 -- -- -- -- -- 94 % --   01/08/22 0727 127/61 98  F (36.7  C) Oral 65 18 96 % --   01/08/22 0715 -- -- -- -- -- (!) 86 % --   01/08/22 0244 121/64 97.2  F (36.2  C) Temporal 87 16 91 % --   01/07/22 1746 -- -- -- -- -- 95 % --   01/07/22 1740 125/57 99.2  F (37.3  C) Oral 97 18 91 % 81.6 kg (180 lb)          Intake/Output Summary (Last 24 hours) at 1/8/2022 1126  Last data filed at 1/8/2022 0244  Gross per 24 hour   Intake --   Output 1 ml   Net -1 ml      Constitutional:   awake, alert, cooperative, no apparent distress, and appears stated age       Eyes:   PERRL,  conjunctiva/corneas clear, EOM's intact; no scleral edema or icterus noted        ENT:   Normocephalic, without obvious abnormality, atraumatic, Lips, mucosa, and tongue normal        Lungs:   Normal respiratory effort, no accessory muscle use       Cardiovascular:   Regular rate and rhythm       Abdomen:   Soft, currently decompressed with nasogastric tube putting out dark bilious fluid.  Minimal tenderness with palpation, mild distention       Musculoskeletal:   No obvious swelling, bruising or deformity       Skin:   Skin color and texture normal for patient, no rashes or lesions              Data:        CT imaging of the abdomen and pelvis obtained overnight demonstrates recurrent small bowel obstruction, with a transition point in the distal ileum may be 20 to 30 cm proximal.  This is in the pelvis and corresponds with the area of obstruction seen on previous CT imaging adjacent to the chronically thick-walled sigmoid colon which does not appear to have any acute evidence of active diverticulitis.  No intra-abdominal abscess or infection noted.    Results for orders placed or performed during the hospital encounter of 01/07/22 (from the past 24 hour(s))   CBC with Platelets & Differential    Narrative    The following orders were created for panel order CBC with Platelets & Differential.  Procedure                               Abnormality         Status                     ---------                               -----------         ------                     CBC with platelets and d...[[id number]]  Abnormal            Final result                 Please view results for these tests on the individual orders.   Basic metabolic panel   Result Value Ref Range    Sodium 139 136 - 145 mmol/L    Potassium 3.5 3.5 - 5.0 mmol/L    Chloride 103 98 - 107 mmol/L    Carbon Dioxide (CO2) 21 (L) 22 - 31 mmol/L    Anion Gap 15 5 - 18 mmol/L    Urea Nitrogen 11 8 - 28 mg/dL    Creatinine 0.75 0.60 - 1.10 mg/dL    Calcium 9.6  8.5 - 10.5 mg/dL    Glucose 123 70 - 125 mg/dL    GFR Estimate 80 >60 mL/min/1.73m2   CBC with platelets and differential   Result Value Ref Range    WBC Count 13.2 (H) 4.0 - 11.0 10e3/uL    RBC Count 5.11 3.80 - 5.20 10e6/uL    Hemoglobin 14.6 11.7 - 15.7 g/dL    Hematocrit 45.8 35.0 - 47.0 %    MCV 90 78 - 100 fL    MCH 28.6 26.5 - 33.0 pg    MCHC 31.9 31.5 - 36.5 g/dL    RDW 13.3 10.0 - 15.0 %    Platelet Count 440 150 - 450 10e3/uL    % Neutrophils 83 %    % Lymphocytes 9 %    % Monocytes 7 %    % Eosinophils 0 %    % Basophils 0 %    % Immature Granulocytes 1 %    NRBCs per 100 WBC 0 <1 /100    Absolute Neutrophils 11.0 (H) 1.6 - 8.3 10e3/uL    Absolute Lymphocytes 1.2 0.8 - 5.3 10e3/uL    Absolute Monocytes 0.9 0.0 - 1.3 10e3/uL    Absolute Eosinophils 0.0 0.0 - 0.7 10e3/uL    Absolute Basophils 0.0 0.0 - 0.2 10e3/uL    Absolute Immature Granulocytes 0.1 <=0.4 10e3/uL    Absolute NRBCs 0.0 10e3/uL   Magnesium   Result Value Ref Range    Magnesium 1.6 (L) 1.8 - 2.6 mg/dL   CT Abdomen Pelvis w Contrast    Narrative    EXAM: CT ABDOMEN PELVIS W CONTRAST  LOCATION: [facility]  DATE/TIME: 1/7/2022 8:58 PM    INDICATION: Diffuse abdominal pain and vomiting; recent small bowel obstruction and diverticulitis  COMPARISON: CT abdomen and pelvis with contrast 01/02/2022, 09/14/2020  TECHNIQUE: CT scan of the abdomen and pelvis was performed following injection of IV contrast. Multiplanar reformats were obtained. Dose reduction techniques were used.  CONTRAST: 100 mL of Isovue-370    FINDINGS:   LOWER CHEST: Combination of emphysema and bands of atelectasis present in both imaged bases, unchanged. Mitral annular calcifications are present.    HEPATOBILIARY: Postcholecystectomy. Physiologic enlargement of the common bile duct. No choledocholithiasis. Homogeneous liver attenuation. Smooth liver border.    PANCREAS: Normal.    SPLEEN: Normal.    ADRENAL GLANDS: Normal.    KIDNEYS/BLADDER: No  significant mass, stones, or hydronephrosis. There are simple or benign cysts. No follow up is needed.    BOWEL: Fluid in the stomach lumen. Distal stomach and duodenum are decompressed. The proximal small bowel is normal caliber. The mid and distal small bowel are mildly dilated with air and fluid and there is mild mucosal hyperenhancement/wall thickening.   There is a transition point of the distal ileum and the central pelvis from dilated to decompressed (series 3, image 168). Liquid stool in the colon. There is positive enteric contrast in the relatively decompressed transverse and descending colon. The   sigmoid colon has persistent wall thickening with linear extensions from the right lateral aspect of the thickened colon towards the small bowel transition point. There are several sigmoid diverticula. The amount of free fluid in the pelvis is increased.    LYMPH NODES: No discrete enlarged lymph nodes in the abdomen or pelvis.    VASCULATURE: Moderate aortoiliac atheromatous calcifications but no aneurysm or critical stenosis.    PELVIC ORGANS: Uterus is absent. Vaginal cuff is normal.    MUSCULOSKELETAL: Previous left hip joint replacement. Lumbar spine surgery with paraspinous rods from L2 to S1 with pedicle screws at L2, L3, L4 and S1. There are disc spacers from L2-L3 through L5-S1. Slight anterolisthesis of L4 on L5 with posterior   decompression at L4. Findings of previous ventral laparotomy but no body wall hernia.      Impression    IMPRESSION:     1.  Findings consistent with persistent low-grade distal small bowel mechanical obstruction with focal caliber transition of the distal ileum in the right pelvis.  2.  Persistent long segment wall thickening of the sigmoid colon in an area with diverticula. The pattern is suggestive of scarring/stricture from prior diverticulitis. As previously reported, there are linear bands extending from the thickened segment   towards the transition point which could  represents sinus tracts/coloenteric fistula.  3.  Amount of free fluid in the pelvis is increased.   Asymptomatic COVID-19 Virus (Coronavirus) by PCR Nasopharyngeal    Specimen: Nasopharyngeal; Swab   Result Value Ref Range    SARS CoV2 PCR Negative Negative    Narrative    Testing was performed using the jonna  SARS-CoV-2 & Influenza A/B Assay on the jonna  Venessa  System.  This test should be ordered for the detection of SARS-COV-2 in individuals who meet SARS-CoV-2 clinical and/or epidemiological criteria. Test performance is unknown in asymptomatic patients.  This test is for in vitro diagnostic use under the FDA EUA for laboratories certified under CLIA to perform moderate and/or high complexity testing. This test has not been FDA cleared or approved.  A negative test does not rule out the presence of PCR inhibitors in the specimen or target RNA in concentration below the limit of detection for the assay. The possibility of a false negative should be considered if the patient's recent exposure or clinical presentation suggests COVID-19.  [facility] are certified under the Clinical Laboratory Improvement Amendments of 1988 (CLIA-88) as qualified to perform moderate and/or high complexity laboratory testing.   XR Abdomen Port 1 View    Narrative    EXAM: XR ABDOMEN PORT 1 VIEWS  LOCATION: [facility]  DATE/TIME: 1/8/2022 1:47 AM    INDICATION: NG placement  COMPARISON: None.      Impression    IMPRESSION: NG tube tip in the distal esophagus.    CBC with platelets differential    Narrative    The following orders were created for panel order CBC with platelets differential.  Procedure                               Abnormality         Status                     ---------                               -----------         ------                     CBC with platelets and d...[[id number]]                      Final result                 Please view results for these tests on  the individual orders.   Basic metabolic panel   Result Value Ref Range    Sodium 140 136 - 145 mmol/L    Potassium 3.5 3.5 - 5.0 mmol/L    Chloride 104 98 - 107 mmol/L    Carbon Dioxide (CO2) 28 22 - 31 mmol/L    Anion Gap 8 5 - 18 mmol/L    Urea Nitrogen 11 8 - 28 mg/dL    Creatinine 0.73 0.60 - 1.10 mg/dL    Calcium 8.4 (L) 8.5 - 10.5 mg/dL    Glucose 102 70 - 125 mg/dL    GFR Estimate 82 >60 mL/min/1.73m2   Magnesium   Result Value Ref Range    Magnesium 2.0 1.8 - 2.6 mg/dL   CBC with platelets and differential   Result Value Ref Range    WBC Count 9.9 4.0 - 11.0 10e3/uL    RBC Count 4.60 3.80 - 5.20 10e6/uL    Hemoglobin 13.3 11.7 - 15.7 g/dL    Hematocrit 41.9 35.0 - 47.0 %    MCV 91 78 - 100 fL    MCH 28.9 26.5 - 33.0 pg    MCHC 31.7 31.5 - 36.5 g/dL    RDW 13.6 10.0 - 15.0 %    Platelet Count 354 150 - 450 10e3/uL    % Neutrophils 77 %    % Lymphocytes 12 %    % Monocytes 8 %    % Eosinophils 2 %    % Basophils 0 %    % Immature Granulocytes 1 %    NRBCs per 100 WBC 0 <1 /100    Absolute Neutrophils 7.6 1.6 - 8.3 10e3/uL    Absolute Lymphocytes 1.2 0.8 - 5.3 10e3/uL    Absolute Monocytes 0.8 0.0 - 1.3 10e3/uL    Absolute Eosinophils 0.2 0.0 - 0.7 10e3/uL    Absolute Basophils 0.0 0.0 - 0.2 10e3/uL    Absolute Immature Granulocytes 0.1 <=0.4 10e3/uL    Absolute NRBCs 0.0 10e3/uL        Stuart Price MD

## 2022-01-08 NOTE — CONSULTS
Care Management Initial Consult    General Information  Assessment completed with: Patient,    Type of CM/SW Visit: Initial Assessment    Primary Care Provider verified and updated as needed: Yes   Readmission within the last 30 days: previous discharge plan unsuccessful   Return Category: Medically unsuccessful treatment plan  Reason for Consult: discharge planning  Advance Care Planning: Advance Care Planning Reviewed: no concerns identified        Communication Assessment  Patient's communication style: spoken language (English or Bilingual)    Hearing Difficulty or Deaf: no   Wear Glasses or Blind: yes    Cognitive  Cognitive/Neuro/Behavioral: WDL          Orientation: oriented x 4             Living Environment:   People in home: alone     Current living Arrangements: apartment (Senior apartment.)      Able to return to prior arrangements: yes     Family/Social Support:  Care provided by: self,child(xuan),other (see comments) (Two daughters locally assist if/when needed.)  Provides care for: no one, unable/limited ability to care for self  Marital Status:   Children (Two supportive daughters and a grandson located locally. )          Description of Support System: Supportive,Involved (As needed.)    Support Assessment: Adequate family and caregiver support,Adequate social supports    Current Resources:   Patient receiving home care services: No     Community Resources: None  Equipment currently used at home: cane, straight,walker, rolling  Supplies currently used at home: None    Employment/Financial:  Employment Status: retired        Financial Concerns: No concerns identified   Referral to Financial Counselor: No     Lifestyle & Psychosocial Needs:  Social Determinants of Health     Tobacco Use: Medium Risk     Smoking Tobacco Use: Former Smoker     Smokeless Tobacco Use: Never Used   Alcohol Use: Not on file   Financial Resource Strain: Not on file   Food Insecurity: Not on file   Transportation Needs:  "Not on file   Physical Activity: Not on file   Stress: Not on file   Social Connections: Not on file   Intimate Partner Violence: Not on file   Depression: Not on file   Housing Stability: Not on file     Functional Status:  Prior to admission patient needed assistance:   Dependent ADLs:: Independent  Dependent IADLs:: Independent  Assesssment of Functional Status: Not at  functional baseline    Mental Health Status:  Mental Health Status: No Current Concerns       Chemical Dependency Status:  Chemical Dependency Status: No Current Concerns           Values/Beliefs:  Spiritual, Cultural Beliefs, Quaker Practices, Values that affect care: no             Additional Information:  Writer met with pt to introduce Care Management service(CM) and obtain a discharge assessment. Pt lives alone in a Senior Apartment. Pt states just losing her  in December of 2021. Pt states being completely independent with I/ADLs at baseline including operating a motor vehicle. No in-home service needs currently. Walker/cane use as desired. Pt stated a total of six adult children. Two daughters and a grandson are local and assist pt as needed.    1/8 note snippet per Cral Chavez -\"81 year old old female recently hospitalized for diverticulitis with questionable small bowel obstruction.  During her recent hospital stay she was seen by gastroenterology and general surgery.  She was treated with antibiotics for diverticulitis and her symptoms improved.  She had a Gastrografin small bowel follow-through at that time that did not show any obstruction.  She was discharged to home on 1/4 to continue oral antibiotics at home.  She presented to the hospital again today with return of symptoms including abdominal pain and vomiting.  Imaging in the emergency department today showed persistent low-grade distal small bowel obstruction with focal caliber transition of the distal ileum in the right pelvis.  NG tube was placed, general surgery " "consulted, treated with Zosyn.\"    Pt is having surgery and may need reevaluation of functional status post-op to support a safe discharge. Anticipate return home via family without issue and with possible services upon discharge. CM to follow and assist with supporting a safe discharge plan as needed/able.    Gumaro Delacruz RN        "

## 2022-01-08 NOTE — OP NOTE
[facility]  Operative Note    Pre-operative diagnosis: SBO (small bowel obstruction) (H) [K56.609]   Post-operative diagnosis small bowel obstruction   Procedure: Procedure(s):  LAPAROSCOPY, LYSIS OF ADHESIONS,  LAPAROSCOPIC SMALL BOWEL RESECTION   Surgeon: Stuart Price MD   Assistants(s):  None   Anesthesia: General    Estimated blood loss: Less than 50 ml    Total IV fluids: (See anesthesia record)   Blood transfusion: No transfusion was given during surgery   Total urine output: (See anesthesia record)   Drains: None   Specimens:  Small bowel   Implants: None   Findings: None.   Complications: None.   Condition: Stable       Description of procedure:  The patient was brought to the operating room where after induction of general anesthesia with endotracheal intubation she was positioned with her right arm tucked.  She was then prepped and draped in sterile fashion.  After procedural timeout, we began by accessing the abdomen with an optical trocar in the right mid abdomen.  After insufflation, additional trochars were placed across the mid abdomen.  On initial survey of the abdomen, there were adhesions between omentum and the anterior abdominal wall in the upper abdomen, but none in the lower abdomen.  The majority of small bowel was slightly inflamed in appearance and dilated.  We began maneuvering the small bowel out of the pelvis after placing the patient into Trendelenburg body positioning.  In the course of doing so a small enterotomy was identified and immediately repaired with a figure-of-eight 3-0 silk suture without any spillage of enteric contrast.  We then traced the small bowel further down into the pelvis where we encountered the site of the obstruction, a dense broad adhesion between the distal ileum, sigmoid colon.  Distal to the site the small bowel was completely normal in caliber and appearance.  We basically had 2 finger fracture with a laparoscopic graspers  between the sigmoid colon and adherent small bowel, likely through the site of a previous fistula or abscess that scarred down leading to the broad adhesion between the 2 loops of bowel.  Once this was ultimately completed, we'll to separate the small bowel entirely from the pelvis.  The small bowel was rather traumatized from this dissection, we therefore elected to resect the portion that was injured.  The mesentery to the segment of ileum was divided with the LigaSure.  Opposing enterotomies were then made proximal and distal to the injured area of small bowel and a side-to-side functional end-to-end 60 mm Endo MARCELLO stapler was utilized to create an enteroenterostomy.  We then proceeded to excise the surgical specimen as well as the common enterotomy with a second firing of the 60 mm stapler, leaving behind an anastomosis approximately 4 cm in intraluminal length.  The juncture of the staple lines was then oversewn with a figure-of-eight 2-0 Vicryl suture.  There was minimal mesenteric defect so this was not closed.  We did not appreciate any leakage from the sigmoid side of the adhesion, and it was quite densely matted with surrounding indurated fatty tissue.  We therefore elected to place a 19 round ROSAMARIA drain at this site to bring it out through one of the port sites on the right side.  The surgical specimen was then removed through the 12 mm port site and the fascia closed with a 0 Vicryl suture.  Insufflation was then released and skin incisions were closed with interrupted 4-0 Vicryl sutures.    Stuart Price MD, [facility]  [phone]  [facility]

## 2022-01-08 NOTE — ANESTHESIA PROCEDURE NOTES
Airway       Patient location during procedure: OR       Procedure Start/Stop Times: 1/8/2022 1:07 PM  Staff -        Anesthesiologist:  Jaycee Martinez MD       CRNA: Arianna Babcock APRN CRNA       Performed By: anesthesiologist and CRNAIndications and Patient Condition       Indications for airway management: nicolette-procedural       Induction type:RSI       Mask difficulty assessment: 0 - not attempted    Final Airway Details       Final airway type: endotracheal airway       Successful airway: ETT - single and Oral  Endotracheal Airway Details        ETT size (mm): 7.0       Cuffed: yes       Successful intubation technique: video laryngoscopy       VL Blade Size: Glidescope 3       Grade View of Cords: 1       Adjucts: stylet       Position: Right       Measured from: lips       Secured at (cm): 22    Post intubation assessment        Placement verified by: capnometry, equal breath sounds and chest rise        Number of attempts at approach: 1       Number of other approaches attempted: 0       Secured with: silk tape       Ease of procedure: easy       Dentition: Intact and Unchanged

## 2022-01-08 NOTE — PROGRESS NOTES
[facility] MEDICINE PROGRESS NOTE      Identification/Summary:   Blessing Castellanos is a 81 year old old female recently hospitalized for diverticulitis with questionable small bowel obstruction.  During her recent hospital stay she was seen by gastroenterology and general surgery.  She was treated with antibiotics for diverticulitis and her symptoms improved.  She had a Gastrografin small bowel follow-through at that time that did not show any obstruction.  She was discharged to home on 1/4 to continue oral antibiotics at home.  She presented to the hospital again 1/7/22 with return of symptoms including abdominal pain and vomiting.  Imaging in the emergency department today showed persistent low-grade distal small bowel obstruction with focal caliber transition of the distal ileum in the right pelvis.  NG tube was placed, general surgery consulted, treated with piperacillin-tazobactam.  This is now patient's third episode of bowel obstruction in 3 months and surgery as an option was discussed with the patient and she wanted to move ahead with that..     Assessment and Plan:  Small bowel obstruction, persistent/recurrent.  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  N.p.o., NG tube if increased pain/nausea, Zosyn, IV fluids  Pain control with IV Dilaudid as needed  Zofran as needed for nausea  Plan: To surgery today for laparoscopic lysis of adhesions.     COPD  Uses oxygen intermittently at home typically just with exertion when she is going out of the house but does not always use it.  Uses Umeclidinium, Dulera, albuterol as needed  Continue home inhalers and oxygen as needed  pulm status at baseline     Gastroesophageal reflux disease  Takes lansoprazole daily     Hypomagnesemia,  corrected  Recheck in the morning, had 2 g IV in the emergency department    Life stresses.  Patient's   about 2 weeks ago from head and neck cancer.  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     DVT proph: Heparin  Code Status: DNR  Disposition: Inpatient   Diet: N.p.o.  Pain tx: IV Dilaudid  PT/OT: None        Chief Complaint at time of admission  abdominal pain and vomiting      HISTORY at time of admission      Blessing Castellanos is a 81 year old old female recently hospitalized for diverticulitis with questionable small bowel obstruction.  During her recent hospital stay she was seen by gastroenterology and general surgery.  She was treated with antibiotics for diverticulitis and her symptoms improved.  She had a Gastrografin small bowel follow-through at that time that did not show any obstruction.  She was discharged to home on  to continue oral antibiotics at home.  She presented to the hospital again today with return of symptoms including abdominal pain and vomiting.  Imaging in the emergency department today showed persistent low-grade distal small bowel obstruction with focal caliber transition of the distal ileum in the right pelvis.  NG tube was placed, general surgery consulted, treated with Zosyn.     No chest pain, dyspnea. At the time of my encounter her abdominal pain and nausea are improved. Symptoms started yesterday with cramping pain, vomiting. No melena or hematochezia. Did have a bowel movement earlier today.      Real Chawla MD  [facility]  Phone: #[phone]    Interval History/Subjective:  Patient feels she is definitely better.  Nausea has resolved and pain has resolved.  She denies any chest pain.  No dyspnea other than her typical  baseline.  She is pretty worn out by recurrent small bowel obstructions.  She has not had to deal with the death of her  couple weeks ago who had been battling head and neck cancer for the past year.    Physical Exam/Objective:  Temp:  [97.2  F (36.2  C)-99.2  F (37.3  C)] 98  F (36.7  C)  Pulse:  [65-97] 65  Resp:  [16-18] 18  BP: (121-127)/(57-64) 127/61  SpO2:  [86 %-96 %] 94 %  Body mass index is 27.37 kg/m .    GENERAL:  Alert, appears comfortable, in no acute distress, appears stated age   HEAD:  Normocephalic, without obvious abnormality, atraumatic   EYES:  EOM's intact   NOSE:  NG tube in place   LUNGS:   Clear to auscultation bilaterally, no rales, rhonchi, or wheezing, symmetric chest rise on inhalation, respirations unlabored.  Mild to moderately decreased breath sounds throughout.   HEART:  Regular rate and rhythm, no murmur   ABDOMEN:   Soft, non-tender no masses, no organomegaly, no rebound or guarding   EXTREMITIES: Extremities normal, atraumatic, no cyanosis or edema    SKIN: Dry to touch, no exanthems in the visualized areas   NEURO: Alert, appears cognitively intact, moves all four extremities freely   PSYCH: Cooperative, behavior is appropriate      Data reviewed today: I personally reviewed all new medications, labs, imaging/diagnostics reports over the past 24 hours. Pertinent findings include:    Imaging:   Recent Results (from the past 24 hour(s))   CT Abdomen Pelvis w Contrast    Narrative    EXAM: CT ABDOMEN PELVIS W CONTRAST  LOCATION: [facility]  DATE/TIME: 1/7/2022 8:58 PM    INDICATION: Diffuse abdominal pain and vomiting; recent small bowel obstruction and diverticulitis  COMPARISON: CT abdomen and pelvis with contrast 01/02/2022, 09/14/2020  TECHNIQUE: CT scan of the abdomen and pelvis was performed following injection of IV contrast. Multiplanar reformats were obtained. Dose reduction techniques were used.  CONTRAST: 100 mL of Isovue-370    FINDINGS:    LOWER CHEST: Combination of emphysema and bands of atelectasis present in both imaged bases, unchanged. Mitral annular calcifications are present.    HEPATOBILIARY: Postcholecystectomy. Physiologic enlargement of the common bile duct. No choledocholithiasis. Homogeneous liver attenuation. Smooth liver border.    PANCREAS: Normal.    SPLEEN: Normal.    ADRENAL GLANDS: Normal.    KIDNEYS/BLADDER: No significant mass, stones, or hydronephrosis. There are simple or benign cysts. No follow up is needed.    BOWEL: Fluid in the stomach lumen. Distal stomach and duodenum are decompressed. The proximal small bowel is normal caliber. The mid and distal small bowel are mildly dilated with air and fluid and there is mild mucosal hyperenhancement/wall thickening.   There is a transition point of the distal ileum and the central pelvis from dilated to decompressed (series 3, image 168). Liquid stool in the colon. There is positive enteric contrast in the relatively decompressed transverse and descending colon. The   sigmoid colon has persistent wall thickening with linear extensions from the right lateral aspect of the thickened colon towards the small bowel transition point. There are several sigmoid diverticula. The amount of free fluid in the pelvis is increased.    LYMPH NODES: No discrete enlarged lymph nodes in the abdomen or pelvis.    VASCULATURE: Moderate aortoiliac atheromatous calcifications but no aneurysm or critical stenosis.    PELVIC ORGANS: Uterus is absent. Vaginal cuff is normal.    MUSCULOSKELETAL: Previous left hip joint replacement. Lumbar spine surgery with paraspinous rods from L2 to S1 with pedicle screws at L2, L3, L4 and S1. There are disc spacers from L2-L3 through L5-S1. Slight anterolisthesis of L4 on L5 with posterior   decompression at L4. Findings of previous ventral laparotomy but no body wall hernia.      Impression    IMPRESSION:     1.  Findings consistent with persistent low-grade distal  small bowel mechanical obstruction with focal caliber transition of the distal ileum in the right pelvis.  2.  Persistent long segment wall thickening of the sigmoid colon in an area with diverticula. The pattern is suggestive of scarring/stricture from prior diverticulitis. As previously reported, there are linear bands extending from the thickened segment   towards the transition point which could represents sinus tracts/coloenteric fistula.  3.  Amount of free fluid in the pelvis is increased.   XR Abdomen Port 1 View    Narrative    EXAM: XR ABDOMEN PORT 1 VIEWS  LOCATION: [facility]  DATE/TIME: 1/8/2022 1:47 AM    INDICATION: NG placement  COMPARISON: None.      Impression    IMPRESSION: NG tube tip in the distal esophagus.        Labs:  Most Recent 3 CBC's:Recent Labs   Lab Test 01/08/22  0658 01/07/22  1756 01/03/22  0601   WBC 9.9 13.2* 10.2   HGB 13.3 14.6 12.2   MCV 91 90 93    440 310     Most Recent 3 BMP's:Recent Labs   Lab Test 01/08/22  0658 01/07/22  1756 01/03/22  0601    139 143   POTASSIUM 3.5 3.5 3.8   CHLORIDE 104 103 111*   CO2 28 21* 25   BUN 11 11 12   CR 0.73 0.75 0.72   ANIONGAP 8 15 7   HANG 8.4* 9.6 8.1*    123 93     Most Recent 2 LFT's:Recent Labs   Lab Test 01/02/22  0116 07/30/20  1602   AST 24 19   ALT 14 11   ALKPHOS 91 99   BILITOTAL 0.8 0.5       Medications:   Personally Reviewed.  Medications     0.9% sodium chloride + KCl 20 mEq/L 75 mL/hr at 01/08/22 1016       fluticasone-vilanterol  1 puff Inhalation Daily     gabapentin  100 mg Oral At Bedtime     pantoprazole  40 mg Oral QAM AC     piperacillin-tazobactam  3.375 g Intravenous Q8H     sodium chloride (PF)  3 mL Intracatheter Q8H     umeclidinium  1 puff Inhalation Daily

## 2022-01-08 NOTE — ED PROVIDER NOTES
EMERGENCY DEPARTMENT ENCOUNTER      NAME: Blessing Castellanos  AGE: 81 year old female  YOB: 1940  MRN: [id number]  EVALUATION DATE & TIME: No admission date for patient encounter.    PCP: Myra Stein    ED PROVIDER: Gabby Little M.D.        Chief Complaint   Patient presents with     Nausea, Vomiting, & Diarrhea         FINAL IMPRESSION:    1. Generalized abdominal pain    2. Nausea and vomiting, intractability of vomiting not specified, unspecified vomiting type    3. Hypomagnesemia    4. SBO (small bowel obstruction) (H)            MEDICAL DECISION MAKIN year old female with recent admission to the hospital for bowel obstruction diverticulitis who was discharged home on Tuesday and now symptoms have returned including abdominal pain and vomiting.  CT scan shows ongoing bowel obstruction with a possible mechanical component in the right pelvis.  She does not appear toxic and abdomen is soft without peritoneal signs.  Plan at this time is admission to the hospital for ongoing support.  Surgery is aware of this patient but no indication for emergent surgery since she is not peritoneal and looks very stable at this time.  Patient will go to [facility].      ED COURSE:  8:00 PM I met with the patient to gather history and perform my exam. ED course and treatment discussed.    9:49 PM I discussed admission with the patient. Patient is agreeable. All questions answered fully.  Family present at bedside and they all agree with admission to the hospital.  She does not appear toxic or septic at this time.    10:09 PM  Discussed the CT scan findings with general surgery, Dr. Price.  She does not appear toxic or septic and abdomen without rebound or guarding.  We will continue to monitor this.    10:27 PM  Patient has been accepted to the hospital by the hospitalist, Dr. Velazquez.  We will place an NG tube.  Patient is aware of this and agrees.  Family still present at bedside.  Patient can  [facility].    I do not think that this represents ACS, PE, ruptured AAA, aortic dissection, bowel ischemia, cholecystitis, pancreatitis, appendicitis, diverticulitis, kidney stone, pyelonephritis, incarcerated or strangulated hernia, ovarian torsion, PID, ectopic pregnancy, tubo-ovarian abscess, viscus perforation, perforated GI ulcer, or other such etiologies at this time.    COVID-19 PPE worn during patient evaluation:  Mask: n95 and homemade masks   Eye Protection: goggles   Gown: none  Hair cover: yes  Face shield: yes  Patient wearing a mask: yes    At the conclusion of the encounter I discussed the results of all of the tests and the disposition. Their questions were answered. The patient (and any family present) acknowledged understanding and were agreeable with the care plan.      CONSULTANTS:  Surgery - Dr. Price        MEDICATIONS GIVEN IN THE EMERGENCY:  Medications   magnesium sulfate 2 g in water intermittent infusion (2 g Intravenous New Bag 1/7/22 2134)   sodium chloride 0.9% infusion ( Intravenous New Bag 1/7/22 2047)   ondansetron (ZOFRAN) injection 4 mg (4 mg Intravenous Given 1/7/22 2047)   fentaNYL (PF) (SUBLIMAZE) injection 25 mcg (25 mcg Intravenous Given 1/7/22 2134)   iopamidol (ISOVUE-370) solution 100 mL (100 mLs Intravenous Given 1/7/22 2111)         NEW PRESCRIPTIONS STARTED AT TODAY'S ER VISIT     Medication List      There are no discharge medications for this visit.             CONDITION:  stable        DISPOSITION:  Med surg as accepted by Dr. Velazquez, hospitalist         =================================================================  =================================================================    HPI    Patient information was obtained from: patient    Use of Intrepreter: N/A     Blessing Castellanos is a 81 year old female with history of small bowel obstruction, colitis, diverticulitis, GERD, COPD, who presents to the ER via EMS with complaints of nausea, vomiting, and  abdominal pain.     Patient reports she was discharged 3 days ago after a bowel obstruction and diverticulitis. Today she ate a a small amount of spaghetti and since then her abdominal pain, cramping, vomiting, and nausea has returned. She endorses multiple episodes of vomiting today, and is concerned that her bowel obstruction is back. She did get zofran by EMS en route with some relief to her nausea, but feels as if her nausea is returning now. Patient denies any fevers, cough, chest pain, shortness of breath, and any other symptoms or complaints at this time.     Per chart review, patient was admitted to [facility] from 1/2-1/4/2022 for evaluation of small bowel obstruction and diverticulitis. GI and general surgery were consulted. She was treated with Zosyn with clinical improvement, tolerated advancement of diet. Transitioned to oral antibiotics for 5 more days after discharge.       REVIEW OF SYSTEMS  Review of Systems   Constitutional: Negative for fever.   Respiratory: Negative for cough and shortness of breath.    Cardiovascular: Negative for chest pain.   Gastrointestinal: Positive for abdominal pain, nausea and vomiting.   Genitourinary: Negative for dysuria.   Allergic/Immunologic: Negative for immunocompromised state.   All other systems reviewed and are negative.        PAST MEDICAL HISTORY:  Past Medical History:   Diagnosis Date     Bell's palsy      COPD (chronic obstructive pulmonary disease) (H)      GERD (gastroesophageal reflux disease)      Hypercholesteremia      Nephrolithiasis      Osteoarthritis      PONV (postoperative nausea and vomiting)      Raynaud's disease      Restless leg      Spinal stenosis          PAST SURGICAL HISTORY:  Past Surgical History:   Procedure Laterality Date     APPENDECTOMY       BACK SURGERY       CATARACT EXTRACTION       CHOLECYSTECTOMY       CYSTOCELE REPAIR       HYSTERECTOMY       JOINT REPLACEMENT Bilateral     shoulder     OTHER SURGICAL HISTORY       kidney stones     TONSILLECTOMY       TOTAL HIP ARTHROPLASTY Left 5/28/2015    Procedure: LEFT HIP TOTAL ARTHROPLASTY;  Surgeon: Chay Moran MD;  Location: [facility] OR;  Service:      TUBAL LIGATION       VEIN LIGATION AND STRIPPING           CURRENT MEDICATIONS:    Prior to Admission medications    Medication Sig Start Date End Date Taking? Authorizing Provider   albuterol (PROVENTIL HFA;VENTOLIN HFA) 90 mcg/actuation inhaler [ALBUTEROL (PROVENTIL HFA;VENTOLIN HFA) 90 MCG/ACTUATION INHALER] Inhale 1-2 puffs every 4 (four) hours as needed for wheezing or shortness of breath. 5/28/15   Provider, Historical   carboxymethylcellulose PF (REFRESH LIQUIGEL) 1 % ophthalmic gel Place 1 drop into both eyes 3 times daily as needed for dry eyes    Unknown, Entered By History   cholecalciferol, vitamin D3, 2,000 unit Tab [CHOLECALCIFEROL, VITAMIN D3, 2,000 UNIT TAB] Take 2,000 Units by mouth once daily.  5/22/15   Provider, Historical   ciprofloxacin (CIPRO) 500 MG tablet Take 1 tablet (500 mg) by mouth 2 times daily for 5 days 1/5/22 1/10/22  Carl Velazquez, DO   fluticasone (FLONASE) 50 MCG/ACT nasal spray Spray 1 spray into both nostrils daily 9/17/21   Mauro Tang MD   gabapentin (NEURONTIN) 100 MG capsule Take 100 mg by mouth At Bedtime  5/22/15   Provider, Historical   lansoprazole (PREVACID) 30 MG capsule Take 30 mg by mouth every morning (before breakfast)  5/28/15   Provider, Historical   metroNIDAZOLE (FLAGYL) 500 MG tablet Take 1 tablet (500 mg) by mouth 3 times daily for 5 days 1/5/22 1/10/22  Carl Velazquez, DO   mometasone-formoterol (DULERA) 200-5 mcg/actuation HFAA inhaler Inhale 2 puffs into the lungs 2 times daily  5/28/15   Provider, Historical   polyethylene glycol (MIRALAX) 17 g packet Take 17 g by mouth At Bedtime    Unknown, Entered By History   umeclidinium (INCRUSE ELLIPTA) 62.5 MCG/INH inhaler Inhale 1 puff into the lungs daily    Unknown, Entered By History          ALLERGIES:  Allergies   Allergen Reactions     Doxycycline Calcium [Doxycycline] Hives     Mirapex [Pramipexole] Hives     Prilosec [Omeprazole] Hives     Sulfa (Sulfonamide Antibiotics) [Sulfa Drugs] Other (See Comments)     GI Upset     Zithromax [Azithromycin] Unknown     Listed on H&P     Antihistamines - Alkylamine [Alkylamines] Anxiety         FAMILY HISTORY:  Family History   Problem Relation Age of Onset     Heart Disease Maternal Grandfather          SOCIAL HISTORY:  Social History     Socioeconomic History     Marital status:      Spouse name: Not on file     Number of children: Not on file     Years of education: Not on file     Highest education level: Not on file   Occupational History     Not on file   Tobacco Use     Smoking status: Former Smoker     Quit date: 1991     Years since quittin.6     Smokeless tobacco: Never Used   Substance and Sexual Activity     Alcohol use: No     Comment: Alcoholic Drinks/day: rare     Drug use: No     Sexual activity: Not on file   Other Topics Concern     Not on file   Social History Narrative     Not on file     Social Determinants of Health     Financial Resource Strain: Not on file   Food Insecurity: Not on file   Transportation Needs: Not on file   Physical Activity: Not on file   Stress: Not on file   Social Connections: Not on file   Intimate Partner Violence: Not on file   Housing Stability: Not on file         VITALS:  Patient Vitals for the past 24 hrs:   BP Temp Temp src Pulse Resp SpO2 Weight   22 1746 -- -- -- -- -- 95 % --   22 1740 125/57 99.2  F (37.3  C) Oral 97 18 91 % 81.6 kg (180 lb)       Wt Readings from Last 3 Encounters:   22 81.6 kg (180 lb)   22 81.8 kg (180 lb 4.8 oz)   21 82.9 kg (182 lb 12.8 oz)         PHYSICAL EXAM    Constitutional:  Well developed, Well nourished, NAD, GCS 15  HENT:  Normocephalic, Atraumatic, Bilateral external ears normal, Nose normal. Neck-  Normal range of  motion, No tenderness, Supple, No stridor.   Eyes:  PERRL, EOMI, Conjunctiva normal, No discharge.  Respiratory:  Normal breath sounds, No respiratory distress, No wheezing, Speaks full sentences easily. No cough.   Cardiovascular:  Normal heart rate, Regular rhythm, No murmurs, No rubs, No gallops. Chest wall nontender.   GI:  No excessive obesity.  Bowel sounds normal, Soft, No tenderness, No masses, No flank tenderness. No rebound or guarding.   : deferred  Musculoskeletal: No cyanosis, No clubbing. Good range of motion in all major joints. No major deformities noted.   Integument:  Warm, Dry, No erythema, No rash.  No petechiae.   Neurologic:  Alert & oriented x 3,  No focal deficits noted.   Psychiatric:  Affect normal, Cooperative          LAB:  All pertinent labs reviewed and interpreted.  Recent Results (from the past 24 hour(s))   Basic metabolic panel    Collection Time: 01/07/22  5:56 PM   Result Value Ref Range    Sodium 139 136 - 145 mmol/L    Potassium 3.5 3.5 - 5.0 mmol/L    Chloride 103 98 - 107 mmol/L    Carbon Dioxide (CO2) 21 (L) 22 - 31 mmol/L    Anion Gap 15 5 - 18 mmol/L    Urea Nitrogen 11 8 - 28 mg/dL    Creatinine 0.75 0.60 - 1.10 mg/dL    Calcium 9.6 8.5 - 10.5 mg/dL    Glucose 123 70 - 125 mg/dL    GFR Estimate 80 >60 mL/min/1.73m2   CBC with platelets and differential    Collection Time: 01/07/22  5:56 PM   Result Value Ref Range    WBC Count 13.2 (H) 4.0 - 11.0 10e3/uL    RBC Count 5.11 3.80 - 5.20 10e6/uL    Hemoglobin 14.6 11.7 - 15.7 g/dL    Hematocrit 45.8 35.0 - 47.0 %    MCV 90 78 - 100 fL    MCH 28.6 26.5 - 33.0 pg    MCHC 31.9 31.5 - 36.5 g/dL    RDW 13.3 10.0 - 15.0 %    Platelet Count 440 150 - 450 10e3/uL    % Neutrophils 83 %    % Lymphocytes 9 %    % Monocytes 7 %    % Eosinophils 0 %    % Basophils 0 %    % Immature Granulocytes 1 %    NRBCs per 100 WBC 0 <1 /100    Absolute Neutrophils 11.0 (H) 1.6 - 8.3 10e3/uL    Absolute Lymphocytes 1.2 0.8 - 5.3 10e3/uL    Absolute  Monocytes 0.9 0.0 - 1.3 10e3/uL    Absolute Eosinophils 0.0 0.0 - 0.7 10e3/uL    Absolute Basophils 0.0 0.0 - 0.2 10e3/uL    Absolute Immature Granulocytes 0.1 <=0.4 10e3/uL    Absolute NRBCs 0.0 10e3/uL   Magnesium    Collection Time: 01/07/22  5:56 PM   Result Value Ref Range    Magnesium 1.6 (L) 1.8 - 2.6 mg/dL       No results found for: [facility]        RADIOLOGY:  Reviewed all pertinent imaging. Please see official radiology report.    CT Abdomen Pelvis w Contrast   Final Result   IMPRESSION:       1.  Findings consistent with persistent low-grade distal small bowel mechanical obstruction with focal caliber transition of the distal ileum in the right pelvis.   2.  Persistent long segment wall thickening of the sigmoid colon in an area with diverticula. The pattern is suggestive of scarring/stricture from prior diverticulitis. As previously reported, there are linear bands extending from the thickened segment    towards the transition point which could represents sinus tracts/coloenteric fistula.   3.  Amount of free fluid in the pelvis is increased.            EKG:    none    PROCEDURES:  none      I, Lucille Sweeney, am serving as a scribe to document services personally performed by Dr. Gabby Little based on my observation and the provider's statements to me. I, Dr. Gabby Little MD attest that Lucille Sweeney is acting in a scribe capacity, has observed my performance of the services and has documented them in accordance with my direction.        Gabby Little M.D. [facility]  Emergency Medicine and Medical Toxicology  [facility] EMERGENCY ROOM  [address] [id number] [phone]  Dept: [phone]           Gabby Little MD  01/07/22 0538

## 2022-01-08 NOTE — PROVIDER NOTIFICATION
Paged Carl Velazquez to request x-ray be placed to verify position of NG Tube.     Orders placed w/ no call back.

## 2022-01-09 ENCOUNTER — APPOINTMENT (OUTPATIENT)
Dept: PHYSICAL THERAPY | Facility: CLINIC | Age: 82
DRG: 337 | End: 2022-01-09
Attending: FAMILY MEDICINE
Payer: COMMERCIAL

## 2022-01-09 ENCOUNTER — APPOINTMENT (OUTPATIENT)
Dept: OCCUPATIONAL THERAPY | Facility: CLINIC | Age: 82
DRG: 337 | End: 2022-01-09
Attending: FAMILY MEDICINE
Payer: COMMERCIAL

## 2022-01-09 LAB
ANION GAP SERPL CALCULATED.3IONS-SCNC: 5 MMOL/L (ref 5–18)
BUN SERPL-MCNC: 9 MG/DL (ref 8–28)
CALCIUM SERPL-MCNC: 8.1 MG/DL (ref 8.5–10.5)
CHLORIDE BLD-SCNC: 108 MMOL/L (ref 98–107)
CO2 SERPL-SCNC: 26 MMOL/L (ref 22–31)
CREAT SERPL-MCNC: 0.62 MG/DL (ref 0.6–1.1)
GFR SERPL CREATININE-BSD FRML MDRD: 89 ML/MIN/1.73M2
GLUCOSE BLD-MCNC: 124 MG/DL (ref 70–125)
HGB BLD-MCNC: 12.2 G/DL (ref 11.7–15.7)
MAGNESIUM SERPL-MCNC: 1.7 MG/DL (ref 1.8–2.6)
PHOSPHATE SERPL-MCNC: 3 MG/DL (ref 2.5–4.5)
POTASSIUM BLD-SCNC: 3.8 MMOL/L (ref 3.5–5)
SODIUM SERPL-SCNC: 139 MMOL/L (ref 136–145)

## 2022-01-09 PROCEDURE — 83735 ASSAY OF MAGNESIUM: CPT | Performed by: SURGERY

## 2022-01-09 PROCEDURE — 250N000013 HC RX MED GY IP 250 OP 250 PS 637: Performed by: SURGERY

## 2022-01-09 PROCEDURE — 250N000011 HC RX IP 250 OP 636: Performed by: FAMILY MEDICINE

## 2022-01-09 PROCEDURE — 99024 POSTOP FOLLOW-UP VISIT: CPT | Performed by: SURGERY

## 2022-01-09 PROCEDURE — 97166 OT EVAL MOD COMPLEX 45 MIN: CPT | Mod: GO

## 2022-01-09 PROCEDURE — 36415 COLL VENOUS BLD VENIPUNCTURE: CPT | Performed by: SURGERY

## 2022-01-09 PROCEDURE — 97116 GAIT TRAINING THERAPY: CPT | Mod: GP

## 2022-01-09 PROCEDURE — 250N000011 HC RX IP 250 OP 636: Performed by: SURGERY

## 2022-01-09 PROCEDURE — 99207 PR NO BILLABLE SERVICE THIS VISIT: CPT | Performed by: FAMILY MEDICINE

## 2022-01-09 PROCEDURE — 99232 SBSQ HOSP IP/OBS MODERATE 35: CPT | Performed by: FAMILY MEDICINE

## 2022-01-09 PROCEDURE — 97535 SELF CARE MNGMENT TRAINING: CPT | Mod: GO

## 2022-01-09 PROCEDURE — 82310 ASSAY OF CALCIUM: CPT | Performed by: SURGERY

## 2022-01-09 PROCEDURE — 97162 PT EVAL MOD COMPLEX 30 MIN: CPT | Mod: GP

## 2022-01-09 PROCEDURE — 120N000001 HC R&B MED SURG/OB

## 2022-01-09 PROCEDURE — 84100 ASSAY OF PHOSPHORUS: CPT | Performed by: SURGERY

## 2022-01-09 PROCEDURE — 85018 HEMOGLOBIN: CPT | Performed by: SURGERY

## 2022-01-09 RX ORDER — MAGNESIUM SULFATE HEPTAHYDRATE 40 MG/ML
2 INJECTION, SOLUTION INTRAVENOUS ONCE
Status: COMPLETED | OUTPATIENT
Start: 2022-01-09 | End: 2022-01-09

## 2022-01-09 RX ADMIN — MAGNESIUM SULFATE HEPTAHYDRATE 2 G: 40 INJECTION, SOLUTION INTRAVENOUS at 08:31

## 2022-01-09 RX ADMIN — ACETAMINOPHEN 975 MG: 325 TABLET ORAL at 10:41

## 2022-01-09 RX ADMIN — GABAPENTIN 100 MG: 100 CAPSULE ORAL at 21:08

## 2022-01-09 RX ADMIN — ACETAMINOPHEN 975 MG: 325 TABLET ORAL at 17:35

## 2022-01-09 RX ADMIN — PIPERACILLIN AND TAZOBACTAM 3.38 G: 3; .375 INJECTION, POWDER, LYOPHILIZED, FOR SOLUTION INTRAVENOUS at 06:06

## 2022-01-09 RX ADMIN — UMECLIDINIUM 1 PUFF: 62.5 AEROSOL, POWDER ORAL at 08:58

## 2022-01-09 RX ADMIN — ACETAMINOPHEN 975 MG: 325 TABLET ORAL at 01:00

## 2022-01-09 RX ADMIN — PANTOPRAZOLE SODIUM 40 MG: 20 TABLET, DELAYED RELEASE ORAL at 06:05

## 2022-01-09 RX ADMIN — ENOXAPARIN SODIUM 40 MG: 100 INJECTION SUBCUTANEOUS at 08:22

## 2022-01-09 ASSESSMENT — ACTIVITIES OF DAILY LIVING (ADL)
ADLS_ACUITY_SCORE: 7

## 2022-01-09 NOTE — PROGRESS NOTES
Occupational Therapy       01/09/22 1145   Quick Adds   Type of Visit Initial Occupational Therapy Evaluation   Living Environment   People in home alone   Current Living Arrangements assisted living   Home Accessibility no concerns   Self-Care   Equipment Currently Used at Home raised toilet seat;grab bar, toilet;grab bar, tub/shower;shower chair;dressing device  (walk-in shower)   Instrumental Activities of Daily Living (IADL)   IADL Comments Pt reports independence with ADLs/IADLs   General Information   Onset of Illness/Injury or Date of Surgery 01/07/22   Referring Physician Real Chawla   Patient/Family Therapy Goal Statement (OT) go home and be able to do the things I need to do   Existing Precautions/Restrictions abdominal   Cognitive Status Examination   Orientation Status orientation to person, place and time   Affect/Mental Status (Cognitive) WNL   Range of Motion Comprehensive   General Range of Motion bilateral upper extremity ROM WFL   Strength Comprehensive (MMT)   Comment, General Manual Muscle Testing (MMT) Assessment BUE NFT due to abdominal precautions; appears WFL   Bed Mobility   Bed Mobility supine-sit   Supine-Sit [place] (Bed Mobility) modified independence;verbal cues   Transfers   Transfers sit-stand transfer   Sit-Stand Transfer   Sit-Stand [place] (Transfers) modified independence;verbal cues   Assistive Device (Sit-Stand Transfers) walker, front-wheeled   Clinical Impression   Criteria for Skilled Therapeutic Interventions Met (OT) yes   OT Diagnosis Decreased ADL independencee   OT Problem List-Impairments impacting ADL post-surgical precautions;pain   Assessment of Occupational Performance 1-3 Performance Deficits   Identified Performance Deficits dressing, transfers   Planned Therapy Interventions (OT) ADL retraining;transfer training   Clinical Decision Making Complexity (OT) moderate complexity   Therapy Frequency (OT) Daily   Predicted Duration of Therapy 1 day    Risk & Benefits of therapy have been explained patient   OT Discharge Planning    OT Discharge Recommendation (DC Rec) home   OT Rationale for DC Rec Pt at baseline for ADLs, demonstrates safety with functional mob and transfers   Total Evaluation Time (Minutes)   Total Evaluation Time (Minutes) 5   Regla Lawrence OTR/L 1/9/2022

## 2022-01-09 NOTE — PROGRESS NOTES
"   01/09/22 1059   Quick Adds   Type of Visit Initial PT Evaluation   Living Environment   People in home alone   Current Living Arrangements independent living facility   Home Accessibility wheelchair accessible   Self-Care   Usual Activity Tolerance good   Current Activity Tolerance good   Regular Exercise No   Equipment Currently Used at Home walker, rolling   General Information   Onset of Illness/Injury or Date of Surgery 01/07/22   Referring Physician Real Chawla MD   Patient/Family Therapy Goals Statement (PT) \"get home\"   Pertinent History of Current Problem (include personal factors and/or comorbidities that impact the POC) s/p small bowel rescection 1/8/22   Existing Precautions/Restrictions abdominal   Cognition   Follows Commands (Cognition) follows multi-step commands   Strength   Manual Muscle Testing Quick Adds Strength WFL   Bed Mobility   Bed Mobility supine-sit;sit-supine   Rolling Left [place] (Bed Mobility) modified independence;verbal cues;supervision   Supine-Sit [place] (Bed Mobility) modified independence;supervision;verbal cues   Sit-Supine [place] (Bed Mobility) modified independence;supervision;verbal cues   Bed Mobility Limitations other (see comments)   Impairments Contributing to Impaired Bed Mobility pain   Comment (Bed Mobility) instructed in log roll to reduce abdominal strain. patient able to demonstate with independence after instruction.   Transfers   Transfers sit-stand transfer   Sit-Stand Transfer   Sit-Stand [place] (Transfers) modified independence   Assistive Device (Sit-Stand Transfers) walker, front-wheeled   Gait/Stairs (Locomotion)   [place] Level (Gait) modified independence   Assistive Device (Gait) walker, front-wheeled   Distance in Feet (Required for LE Total Joints) 160   Pattern (Gait) step-through   Clinical Impression   Criteria for Skilled Therapeutic Intervention yes, treatment indicated   PT Diagnosis (PT) abdominal pain "   Influenced by the following impairments s/p small bowel rescection 1/8/22   Functional limitations due to impairments abdominal incision, ROSAMARIA drain   Clinical Presentation Evolving/Changing   Clinical Presentation Rationale recent surgery   Clinical Decision Making (Complexity) moderate complexity   Therapy Frequency (PT) One time eval and treatment only   Predicted Duration of Therapy Intervention (days/wks) 1 day   Planned Therapy Interventions (PT) bed mobility training;gait training   Anticipated Equipment Needs at Discharge (PT) walker, rolling   Risk & Benefits of therapy have been explained care plan/treatment goals reviewed   PT Discharge Planning    PT Discharge Recommendation (DC Rec) home   PT Rationale for DC Rec accessible home, safe gait with walker, able to perform bed mobility indepedently, family supportive if she should require assistance   Total Evaluation Time   Total Evaluation Time (Minutes) 5

## 2022-01-09 NOTE — PLAN OF CARE
Physical Therapy Discharge Summary    Reason for therapy discharge:    Discharged to home.  All goals and outcomes met, no further needs identified.    Progress towards therapy goal(s). See goals on Care Plan in [facility] electronic health record for goal details.  Goals met    Therapy recommendation(s):    No further therapy is recommended.

## 2022-01-09 NOTE — PLAN OF CARE
Pt is alert & pleasant & able to make needs known. She has denied pain much of this shift which she attributes to being well medicated during her procedure (lysis of intestinal adhesions). Pt also has chronic back pain after 2 prior spinal surgeries. At approximately 2100 hours pt stated she had a brief occurrence of back pain that went away as quickly as it came. Pt has been up to the restroom at least 3 times this shift to void. She has a J/P drain in her lower mid abdomen that is putting out bright red blood.

## 2022-01-09 NOTE — PROGRESS NOTES
ASSESSMENT:  1. Generalized abdominal pain    2. Nausea and vomiting, intractability of vomiting not specified, unspecified vomiting type    3. Hypomagnesemia    4. SBO (small bowel obstruction) (H)        Blessing Castellanos is a 81 year old female who is s/p laparoscopic adhesiolysis and small bowel resection on January 8, 2022.  Doing well.  Potential for discharge tomorrow    PLAN:  -Diet as tolerated, though advised her to go slow  -We will need outpatient follow-up with GI for colonoscopy and evaluation of her chronic sigmoid inflammation  -ROSAMARIA drain remain in place until discharge  -We will discontinue antibiotic therapy as no indication exist currently    SUBJECTIVE:   She is doing well this morning.  Tolerating liquids.  Mild pain.      Patient Vitals for the past 24 hrs:   BP Temp Temp src Pulse Resp SpO2   01/09/22 0741 (!) 152/72 97.7  F (36.5  C) Oral 57 20 99 %   01/09/22 0340 (!) 143/65 97.7  F (36.5  C) Oral 64 20 95 %   01/09/22 0052 (!) 141/66 97.6  F (36.4  C) Oral 69 -- 97 %   01/08/22 2304 (!) 141/84 97.9  F (36.6  C) Oral 66 18 95 %   01/08/22 2118 (!) 175/74 97.9  F (36.6  C) Oral 68 15 98 %   01/08/22 1812 -- -- -- -- -- 98 %   01/08/22 1806 (!) 161/74 98.3  F (36.8  C) Oral 78 16 92 %   01/08/22 1710 (!) 160/73 98  F (36.7  C) Oral 73 16 97 %   01/08/22 1610 (!) 149/67 97.8  F (36.6  C) Oral 67 16 96 %   01/08/22 1540 (!) 171/75 97.6  F (36.4  C) Oral 72 16 92 %   01/08/22 1528 (!) 147/68 97.9  F (36.6  C) Temporal 71 14 96 %   01/08/22 1520 (!) 156/72 -- -- 72 16 94 %   01/08/22 1510 (!) 168/69 98.6  F (37  C) Temporal 74 16 98 %   01/08/22 1500 (!) 150/70 -- -- 71 18 100 %   01/08/22 1451 (!) 155/70 97.8  F (36.6  C) Temporal 71 16 100 %         PHYSICAL EXAM:  GEN: No acute distress, comfortable  LUNGS: CTA bilaterally  CV:RRR  ABD: Soft, nondistended.  ROSAMARIA drain with serosanguineous output, just over 200 cc since placement yesterday    Output by Drain (mL) 01/07/22 0700 - 01/07/22 2968  01/07/22 1500 - 01/07/22 2259 01/07/22 2300 - 01/08/22 0659 01/08/22 0700 - 01/08/22 1459 01/08/22 1500 - 01/08/22 2259 01/08/22 2300 - 01/09/22 0659 01/09/22 0700 - 01/09/22 0859   Closed/Suction Drain 1 Midline;Right Abdomen 19 [place]     175  60      EXT:No cyanosis, edema or obvious abnormalities    01/08 0700 - 01/09 0659  In: 2050 [I.V.:1800]  Out: 775 [Drains:175]    Admission on 01/07/2022   Component Date Value     Sodium 01/07/2022 139      Potassium 01/07/2022 3.5      Chloride 01/07/2022 103      Carbon Dioxide (CO2) 01/07/2022 21*     Anion Gap 01/07/2022 15      Urea Nitrogen 01/07/2022 11      Creatinine 01/07/2022 0.75      Calcium 01/07/2022 9.6      Glucose 01/07/2022 123      GFR Estimate 01/07/2022 80      WBC Count 01/07/2022 13.2*     RBC Count 01/07/2022 5.11      Hemoglobin 01/07/2022 14.6      Hematocrit 01/07/2022 45.8      MCV 01/07/2022 90      MCH 01/07/2022 28.6      MCHC 01/07/2022 31.9      RDW 01/07/2022 13.3      Platelet Count 01/07/2022 440      % Neutrophils 01/07/2022 83      % Lymphocytes 01/07/2022 9      % Monocytes 01/07/2022 7      % Eosinophils 01/07/2022 0      % Basophils 01/07/2022 0      % Immature Granulocytes 01/07/2022 1      NRBCs per 100 WBC 01/07/2022 0      Absolute Neutrophils 01/07/2022 11.0*     Absolute Lymphocytes 01/07/2022 1.2      Absolute Monocytes 01/07/2022 0.9      Absolute Eosinophils 01/07/2022 0.0      Absolute Basophils 01/07/2022 0.0      Absolute Immature Granul* 01/07/2022 0.1      Absolute NRBCs 01/07/2022 0.0      Magnesium 01/07/2022 1.6*     SARS CoV2 PCR 01/07/2022 Negative      Sodium 01/08/2022 140      Potassium 01/08/2022 3.5      Chloride 01/08/2022 104      Carbon Dioxide (CO2) 01/08/2022 28      Anion Gap 01/08/2022 8      Urea Nitrogen 01/08/2022 11      Creatinine 01/08/2022 0.73      Calcium 01/08/2022 8.4*     Glucose 01/08/2022 102      GFR Estimate 01/08/2022 82      Magnesium 01/08/2022 2.0      WBC Count 01/08/2022 9.9       RBC Count 01/08/2022 4.60      Hemoglobin 01/08/2022 13.3      Hematocrit 01/08/2022 41.9      MCV 01/08/2022 91      MCH 01/08/2022 28.9      MCHC 01/08/2022 31.7      RDW 01/08/2022 13.6      Platelet Count 01/08/2022 354      % Neutrophils 01/08/2022 77      % Lymphocytes 01/08/2022 12      % Monocytes 01/08/2022 8      % Eosinophils 01/08/2022 2      % Basophils 01/08/2022 0      % Immature Granulocytes 01/08/2022 1      NRBCs per 100 WBC 01/08/2022 0      Absolute Neutrophils 01/08/2022 7.6      Absolute Lymphocytes 01/08/2022 1.2      Absolute Monocytes 01/08/2022 0.8      Absolute Eosinophils 01/08/2022 0.2      Absolute Basophils 01/08/2022 0.0      Absolute Immature Granul* 01/08/2022 0.1      Absolute NRBCs 01/08/2022 0.0      Sodium 01/09/2022 139      Potassium 01/09/2022 3.8      Chloride 01/09/2022 108*     Carbon Dioxide (CO2) 01/09/2022 26      Anion Gap 01/09/2022 5      Urea Nitrogen 01/09/2022 9      Creatinine 01/09/2022 0.62      Calcium 01/09/2022 8.1*     Glucose 01/09/2022 124      GFR Estimate 01/09/2022 89      Hemoglobin 01/09/2022 12.2      Magnesium 01/09/2022 1.7*     Phosphorus 01/09/2022 3.0           Stuart Price MD

## 2022-01-09 NOTE — PLAN OF CARE
Patient denies pain. IVF running. Tolerating full liquid diet. Laparoscopy site on the L side has gauze & tape and some dried bloody drainage from previous shift. The other sites are WDL. ROSAMARIA intact. VSS. Calls appropriately. Alarms on for safety.

## 2022-01-09 NOTE — PLAN OF CARE
Occupational Therapy Discharge Summary    Reason for therapy discharge:    All goals and outcomes met, no further needs identified.    Progress towards therapy goal(s). See goals on Care Plan in [facility] electronic health record for goal details.  Goals met    Therapy recommendation(s):    No further therapy is recommended. Pt is at baseline with ADLs and demonstrates safety and Mod I with functional transfers and mobility.    Regla Lawrence, OTR/L 1/9/2022

## 2022-01-09 NOTE — PLAN OF CARE
Problem: Adult Inpatient Plan of Care  Goal: Patient-Specific Goal (Individualized)  Outcome: Improving      Patient only having minimal abdominal  soreness that occurs with movement. Discomfort managed with scheduled Tylenol. Abdominal lap sites covered with band aids, dried drainage present. ROSAMARIA drain to bulb suction. Tolerating oral intake, diet advanced to regular. Passed PT/OT evaluation during the day. Ambulating with stand by assistance, VSS.

## 2022-01-10 VITALS
BODY MASS INDEX: 28.07 KG/M2 | WEIGHT: 184.6 LBS | DIASTOLIC BLOOD PRESSURE: 70 MMHG | TEMPERATURE: 97.4 F | HEART RATE: 69 BPM | RESPIRATION RATE: 16 BRPM | OXYGEN SATURATION: 90 % | SYSTOLIC BLOOD PRESSURE: 171 MMHG

## 2022-01-10 LAB
ALBUMIN SERPL-MCNC: 3.5 G/DL (ref 3.5–5)
ALP SERPL-CCNC: 65 U/L (ref 45–120)
ALT SERPL W P-5'-P-CCNC: 15 U/L (ref 0–45)
ANION GAP SERPL CALCULATED.3IONS-SCNC: 10 MMOL/L (ref 5–18)
AST SERPL W P-5'-P-CCNC: 25 U/L (ref 0–40)
BILIRUB SERPL-MCNC: 0.4 MG/DL (ref 0–1)
BUN SERPL-MCNC: 12 MG/DL (ref 8–28)
CALCIUM SERPL-MCNC: 8.8 MG/DL (ref 8.5–10.5)
CHLORIDE BLD-SCNC: 102 MMOL/L (ref 98–107)
CO2 SERPL-SCNC: 28 MMOL/L (ref 22–31)
CREAT SERPL-MCNC: 0.73 MG/DL (ref 0.6–1.1)
ERYTHROCYTE [DISTWIDTH] IN BLOOD BY AUTOMATED COUNT: 13.4 % (ref 10–15)
GFR SERPL CREATININE-BSD FRML MDRD: 82 ML/MIN/1.73M2
GLUCOSE BLD-MCNC: 94 MG/DL (ref 70–125)
GLUCOSE BLDC GLUCOMTR-MCNC: 81 MG/DL (ref 70–99)
HCT VFR BLD AUTO: 45.8 % (ref 35–47)
HGB BLD-MCNC: 14.6 G/DL (ref 11.7–15.7)
MAGNESIUM SERPL-MCNC: 1.8 MG/DL (ref 1.8–2.6)
MCH RBC QN AUTO: 29.3 PG (ref 26.5–33)
MCHC RBC AUTO-ENTMCNC: 31.9 G/DL (ref 31.5–36.5)
MCV RBC AUTO: 92 FL (ref 78–100)
PHOSPHATE SERPL-MCNC: 2.4 MG/DL (ref 2.5–4.5)
PLATELET # BLD AUTO: 440 10E3/UL (ref 150–450)
POTASSIUM BLD-SCNC: 3.7 MMOL/L (ref 3.5–5)
PROT SERPL-MCNC: 7.2 G/DL (ref 6–8)
RBC # BLD AUTO: 4.99 10E6/UL (ref 3.8–5.2)
SODIUM SERPL-SCNC: 140 MMOL/L (ref 136–145)
WBC # BLD AUTO: 10.7 10E3/UL (ref 4–11)

## 2022-01-10 PROCEDURE — 80053 COMPREHEN METABOLIC PANEL: CPT | Performed by: FAMILY MEDICINE

## 2022-01-10 PROCEDURE — 99024 POSTOP FOLLOW-UP VISIT: CPT | Performed by: PHYSICIAN ASSISTANT

## 2022-01-10 PROCEDURE — 250N000013 HC RX MED GY IP 250 OP 250 PS 637: Performed by: SURGERY

## 2022-01-10 PROCEDURE — 36415 COLL VENOUS BLD VENIPUNCTURE: CPT | Performed by: FAMILY MEDICINE

## 2022-01-10 PROCEDURE — 84100 ASSAY OF PHOSPHORUS: CPT | Performed by: SURGERY

## 2022-01-10 PROCEDURE — 85018 HEMOGLOBIN: CPT | Performed by: FAMILY MEDICINE

## 2022-01-10 PROCEDURE — 83735 ASSAY OF MAGNESIUM: CPT | Performed by: SURGERY

## 2022-01-10 RX ORDER — ACETAMINOPHEN 325 MG/1
650 TABLET ORAL EVERY 4 HOURS PRN
COMMUNITY
Start: 2022-01-11

## 2022-01-10 RX ADMIN — PANTOPRAZOLE SODIUM 40 MG: 20 TABLET, DELAYED RELEASE ORAL at 06:07

## 2022-01-10 RX ADMIN — ACETAMINOPHEN 975 MG: 325 TABLET ORAL at 02:59

## 2022-01-10 RX ADMIN — ACETAMINOPHEN 975 MG: 325 TABLET ORAL at 09:17

## 2022-01-10 RX ADMIN — UMECLIDINIUM 1 PUFF: 62.5 AEROSOL, POWDER ORAL at 09:14

## 2022-01-10 ASSESSMENT — ACTIVITIES OF DAILY LIVING (ADL)
ADLS_ACUITY_SCORE: 7

## 2022-01-10 NOTE — DISCHARGE INSTRUCTIONS
Follow up: It is our practice to have all patients follow up with us 2-3 weeks after their surgery to ensure they are recovering well.  For straightforward laparoscopic procedures, this can be done either in clinic as a scheduled follow up appointment, or over the phone.  If you would like a scheduled follow up appointment in clinic, please call us at [phone] to schedule an appointment at your convenience.  If you would prefer to follow up with us by phone please let us know so that we may contact you 2-3 weeks following your procedure.        Diet: Regular diet. Patients can have difficulty with constipation following surgery, due in part to the administration of narcotic medications.  If you are suffering with constipation, you should avoid foods such as hard cheeses or red meat.  Foods high in fiber are recommended.      Activity: You should continue to be active at home, including ambulating frequently.  If possible try to limit the amount of time spent in bed.    Restrictions: You have no lifting restrictions post operatively, but may wish to avoid strenuous physical activity for 1-2 weeks.  You should limit your physical activity if it causes you discomfort; however, this should resolve within 1-2 weeks.   Walking does not count as strenuous physical activity.  You are safe to walk up and down stairs.  Following 2 weeks you may resume all normal physical activity.    Wound / drain care: You may remove your Band-aids after a period of 48 hours.  The small white strips on the incisions act like artificial scabs, and will begin to peel at the edges at around 7-10 days.  These can then be removed.    It is normal to have a small rim of red present around the incisions. This should not, however, extend beyond 1/4 inch from the incision.  If your incisions become increasingly tender, red, or draining, please contact us.       Bathing: You may shower after 48 hours from surgery.  It is ok to get your incisions  wet, but avoid rubbing them.  Avoid soaking in bath tubs, or swimming in lakes, pools, or streams for 4 weeks following surgery.        follow-up with GI for colonoscopy and evaluation of her chronic sigmoid inflammation    [facility]     Since 1/7/2022    [phone]

## 2022-01-10 NOTE — CONSULTS
Integrative Therapy Consult    Healing PresenceYes  Essential Oils: Topical (EO/Topical Oil)      (no EO)       Healing Music:       Breathwork:       Guided Imagery:       Acupressure:       Oshibori:       Energy Therapy:       Healing Touch:       Reiki:       Qi Gong:     Massage: Foot      Targeted Massage:    Sleep Promotion:       Other Therapy:       Intervention Reason: Well Being     Pre and Post Session Scores: Patient Desires Treatment: yes                             Delivery:         Referrals:      Haley Rosas

## 2022-01-10 NOTE — PLAN OF CARE
Patient vital signs are at baseline: Yes  Patient able to ambulate as they were prior to admission or with assist devices provided by therapies during their stay:  Yes  Patient MUST void prior to discharge:  Yes  Patient able to tolerate oral intake:  Yes  Pain has adequate pain control using Oral analgesics:  Yes    Pt A/Ox4. Up with SBA, gaitbelt, walker. Lungs clear, on room air. Pt is voiding, no BM<, not passing gas per pt. ROSAMARIA drain has moderate output, lap sites covered with bandaids. +1 edema to bilat. Foot/ankle. Pt began to pass gas and have BM this AM, she is relieved and feels better about discharge.

## 2022-01-10 NOTE — PLAN OF CARE
Problem: Discharge Planning  Goal: Discharge Planning (Adult, OB, Behavioral, Peds)  Outcome: Adequate for Discharge   General surgery discontinued ROSAMARIA drain and feels pt can discharge home today.  Pt denies abdominal pain or nausea.  Pt is tolerating a regular diet.  Pt is passing flatus and has had 3 bowel movements this morning.  Pt and daughter verbalize understanding of follow up and discharge instructions.

## 2022-01-10 NOTE — PLAN OF CARE
"Pt is alert & pleasant & able to make needs known. She has consumed a \"regular\" dinner without a return of symptoms (pain/nausea etc). Pt also ambulated with stand by assist in the hallway this evening. Her bowel sounds remain hypoactive to audible at times & pt denies passing flatus this shift. She states she doesn't want to discharge without first having at least one bowel movement, since that's what she did a few days ago and ended up back in the ER.   "

## 2022-01-10 NOTE — PROGRESS NOTES
[facility] MEDICINE PROGRESS NOTE      Identification/Summary:   Blessing Castellanos is a 81 year old old female recently hospitalized for diverticulitis with questionable small bowel obstruction.  During her recent hospital stay she was seen by gastroenterology and general surgery.  She was treated with antibiotics for diverticulitis and her symptoms improved.  She had a Gastrografin small bowel follow-through at that time that did not show any obstruction.  She was discharged to home on 1/4 to continue oral antibiotics at home.  She presented to the hospital again 1/7/22 with return of symptoms including abdominal pain and vomiting.  Imaging in the emergency department today showed persistent low-grade distal small bowel obstruction with focal caliber transition of the distal ileum in the right pelvis.  NG tube was placed, general surgery consulted, treated with piperacillin-tazobactam.  This is now patient's third episode of bowel obstruction in 3 months and surgery as an option was discussed with the patient and she wanted to move ahead with that..     Assessment and Plan:  Postop day #1: Laparoscopic removal of adhesions and removal of small section of small intestine.    Small bowel obstruction, persistent/recurrent.  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  Reasonable chance she will be able to be discharged home tomorrow     COPD  Uses oxygen intermittently at home typically just with exertion when she is going out of the house but does not always use it.  Uses Umeclidinium, Dulera, albuterol as needed  Continue home inhalers and oxygen as needed  requiring small amount of oxygen postoperatively 1-2 L.  Plan: Incentive spirometry and encourage increased activity     Gastroesophageal reflux  disease  Takes lansoprazole daily     Hypomagnesemia, corrected  Recheck in the morning, had 2 g IV in the emergency department     Life stresses.  Patient's   about 2 weeks ago from head and neck cancer.  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     DVT proph: Heparin  Code Status: DNR  Disposition: Inpatient   Diet: N.p.o.        Chief Complaint at time of admission  abdominal pain and vomiting      HISTORY at time of admission      Blessing Castellanos is a 81 year old old female recently hospitalized for diverticulitis with questionable small bowel obstruction.  During her recent hospital stay she was seen by gastroenterology and general surgery.  She was treated with antibiotics for diverticulitis and her symptoms improved.  She had a Gastrografin small bowel follow-through at that time that did not show any obstruction.  She was discharged to home on  to continue oral antibiotics at home.  She presented to the hospital again today with return of symptoms including abdominal pain and vomiting.  Imaging in the emergency department today showed persistent low-grade distal small bowel obstruction with focal caliber transition of the distal ileum in the right pelvis.  NG tube was placed, general surgery consulted, treated with Zosyn.     No chest pain, dyspnea. At the time of my encounter her abdominal pain and nausea are improved. Symptoms started yesterday with cramping pain, vomiting. No melena or hematochezia. Did have a bowel movement earlier today.         Real Chawla MD  [facility]  Phone: #[phone]    Interval History/Subjective:  Patient is surprised at how well she is doing.  Tolerating full liquids.  No nausea.  Only mild abdominal pain.  No chest  pain.  No new shortness of breath.  Lightheadedness.  No other concerns or issues.    Physical Exam/Objective:  Temp:  [97.4  F (36.3  C)-97.9  F (36.6  C)] 97.4  F (36.3  C)  Pulse:  [57-69] 65  Resp:  [15-20] 16  BP: (127-175)/(60-84) 127/60  SpO2:  [91 %-99 %] 91 %  Body mass index is 27.37 kg/m .    GENERAL:  Alert, appears comfortable, in no acute distress, appears stated age   HEAD:  Normocephalic, without obvious abnormality, atraumatic   LUNGS:   Clear to auscultation bilaterally, no rales, rhonchi, or wheezing, symmetric chest rise on inhalation, respirations unlabored   CHEST WALL:  No deformity   HEART:  Regular rate and rhythm, no murmur   ABDOMEN:   Soft, non-tender, no masses, no organomegaly, no rebound or guarding   EXTREMITIES: Extremities normal, atraumatic, no cyanosis or edema    SKIN: Dry to touch, no exanthems in the visualized areas   NEURO: Alert, appears cognitively intact, moves all four extremities freely   PSYCH: Cooperative, behavior is appropriate      Data reviewed today: I personally reviewed all new medications, labs, imaging/diagnostics reports over the past 24 hours. Pertinent findings include:    Labs:  Most Recent 3 CBC's:Recent Labs   Lab Test 01/09/22  0610 01/08/22  0658 01/07/22  1756 01/03/22  0601   WBC  --  9.9 13.2* 10.2   HGB 12.2 13.3 14.6 12.2   MCV  --  91 90 93   PLT  --  354 440 310     Most Recent 3 BMP's:Recent Labs   Lab Test 01/09/22  0610 01/08/22  0658 01/07/22  1756    140 139   POTASSIUM 3.8 3.5 3.5   CHLORIDE 108* 104 103   CO2 26 28 21*   BUN 9 11 11   CR 0.62 0.73 0.75   ANIONGAP 5 8 15   HANG 8.1* 8.4* 9.6    102 123     Most Recent 2 LFT's:Recent Labs   Lab Test 01/02/22  0116 07/30/20  1602   AST 24 19   ALT 14 11   ALKPHOS 91 99   BILITOTAL 0.8 0.5       Medications:   Personally Reviewed.  Medications     dextrose 5% and 0.9% NaCl with potassium chloride 20 mEq Stopped (01/09/22 1031)       acetaminophen  975 mg Oral Q8H     enoxaparin  ANTICOAGULANT  40 mg Subcutaneous Q24H     fluticasone-vilanterol  1 puff Inhalation Daily     gabapentin  100 mg Oral At Bedtime     pantoprazole  40 mg Oral QAM AC     sodium chloride (PF)  3 mL Intracatheter Q8H     umeclidinium  1 puff Inhalation Daily

## 2022-01-10 NOTE — PROGRESS NOTES
ASSESSMENT:  1. Generalized abdominal pain    2. Nausea and vomiting, intractability of vomiting not specified, unspecified vomiting type    3. Hypomagnesemia    4. SBO (small bowel obstruction) (H)        Blessing Castellanos is a 81 year old female who is s/p laparoscopic adhesiolysis and small bowel resection on January 8, 2022.  Doing well, bowel function has returned.    PLAN:  -ADAT  -We will need outpatient follow-up with GI for colonoscopy and evaluation of her chronic sigmoid inflammation  -ROSAMARIA removed  -Ok to discharge from surgical standpoint, surgery orders and recs placed    Juan Kumar PA-C  Pager - [phone]  Phone - [phone]   General Surgery      SUBJECTIVE:   She is doing well this morning.  Tolerating diet, passing gas and BM now.      Patient Vitals for the past 24 hrs:   BP Temp Temp src Pulse Resp SpO2 Weight   01/10/22 0734 (!) 171/70 97.4  F (36.3  C) Oral 69 16 90 % --   01/10/22 0653 -- -- -- -- -- -- 83.7 kg (184 lb 9.6 oz)   01/10/22 0000 120/58 98  F (36.7  C) Oral 71 16 91 % --   01/09/22 1555 127/60 97.4  F (36.3  C) Oral 65 16 91 % --         PHYSICAL EXAM:  GEN: No acute distress, comfortable  LUNGS: CTA bilaterally  CV:RRR  ABD: Soft, nondistended, expected ttp  ROSAMARIA drain with serosanguineous output - removed by me    Output by Drain (mL) 01/08/22 0700 - 01/08/22 1459 01/08/22 1500 - 01/08/22 2259 01/08/22 2300 - 01/09/22 0659 01/09/22 0700 - 01/09/22 1459 01/09/22 1500 - 01/09/22 2259 01/09/22 2300 - 01/10/22 0659 01/10/22 0700 - 01/10/22 0820   Closed/Suction Drain 1 Midline;Right Abdomen 19 [place]  175  115  120       EXT:No cyanosis, edema or obvious abnormalities    01/09 0700 - 01/10 0659  In: -   Out: 235 [Drains:235]    Admission on 01/07/2022   Component Date Value     Sodium 01/07/2022 139      Potassium 01/07/2022 3.5      Chloride 01/07/2022 103      Carbon Dioxide (CO2) 01/07/2022 21*     Anion Gap 01/07/2022 15      Urea Nitrogen 01/07/2022 11      Creatinine  01/07/2022 0.75      Calcium 01/07/2022 9.6      Glucose 01/07/2022 123      GFR Estimate 01/07/2022 80      WBC Count 01/07/2022 13.2*     RBC Count 01/07/2022 5.11      Hemoglobin 01/07/2022 14.6      Hematocrit 01/07/2022 45.8      MCV 01/07/2022 90      MCH 01/07/2022 28.6      MCHC 01/07/2022 31.9      RDW 01/07/2022 13.3      Platelet Count 01/07/2022 440      % Neutrophils 01/07/2022 83      % Lymphocytes 01/07/2022 9      % Monocytes 01/07/2022 7      % Eosinophils 01/07/2022 0      % Basophils 01/07/2022 0      % Immature Granulocytes 01/07/2022 1      NRBCs per 100 WBC 01/07/2022 0      Absolute Neutrophils 01/07/2022 11.0*     Absolute Lymphocytes 01/07/2022 1.2      Absolute Monocytes 01/07/2022 0.9      Absolute Eosinophils 01/07/2022 0.0      Absolute Basophils 01/07/2022 0.0      Absolute Immature Granul* 01/07/2022 0.1      Absolute NRBCs 01/07/2022 0.0      Magnesium 01/07/2022 1.6*     SARS CoV2 PCR 01/07/2022 Negative      Sodium 01/08/2022 140      Potassium 01/08/2022 3.5      Chloride 01/08/2022 104      Carbon Dioxide (CO2) 01/08/2022 28      Anion Gap 01/08/2022 8      Urea Nitrogen 01/08/2022 11      Creatinine 01/08/2022 0.73      Calcium 01/08/2022 8.4*     Glucose 01/08/2022 102      GFR Estimate 01/08/2022 82      Magnesium 01/08/2022 2.0      WBC Count 01/08/2022 9.9      RBC Count 01/08/2022 4.60      Hemoglobin 01/08/2022 13.3      Hematocrit 01/08/2022 41.9      MCV 01/08/2022 91      MCH 01/08/2022 28.9      MCHC 01/08/2022 31.7      RDW 01/08/2022 13.6      Platelet Count 01/08/2022 354      % Neutrophils 01/08/2022 77      % Lymphocytes 01/08/2022 12      % Monocytes 01/08/2022 8      % Eosinophils 01/08/2022 2      % Basophils 01/08/2022 0      % Immature Granulocytes 01/08/2022 1      NRBCs per 100 WBC 01/08/2022 0      Absolute Neutrophils 01/08/2022 7.6      Absolute Lymphocytes 01/08/2022 1.2      Absolute Monocytes 01/08/2022 0.8      Absolute Eosinophils 01/08/2022 0.2       Absolute Basophils 01/08/2022 0.0      Absolute Immature Granul* 01/08/2022 0.1      Absolute NRBCs 01/08/2022 0.0      Sodium 01/09/2022 139      Potassium 01/09/2022 3.8      Chloride 01/09/2022 108*     Carbon Dioxide (CO2) 01/09/2022 26      Anion Gap 01/09/2022 5      Urea Nitrogen 01/09/2022 9      Creatinine 01/09/2022 0.62      Calcium 01/09/2022 8.1*     Glucose 01/09/2022 124      GFR Estimate 01/09/2022 89      Hemoglobin 01/09/2022 12.2      Magnesium 01/09/2022 1.7*     Phosphorus 01/09/2022 3.0           Juan Kumar PA-C

## 2022-01-11 ENCOUNTER — TELEPHONE (OUTPATIENT)
Dept: SURGERY | Facility: CLINIC | Age: 82
End: 2022-01-11
Payer: COMMERCIAL

## 2022-01-11 LAB
PATH REPORT.COMMENTS IMP SPEC: NORMAL
PATH REPORT.COMMENTS IMP SPEC: NORMAL
PATH REPORT.FINAL DX SPEC: NORMAL
PATH REPORT.GROSS SPEC: NORMAL
PATH REPORT.MICROSCOPIC SPEC OTHER STN: NORMAL
PATH REPORT.RELEVANT HX SPEC: NORMAL
PHOTO IMAGE: NORMAL

## 2022-01-11 NOTE — TELEPHONE ENCOUNTER
Spoke to Blessing. She complains of diarrhea that started yesterday. She says she has had diarrhea 5-10 times since last night. No blood in the stool that she can see. No fevers. She complains of right groin pain that fluctuates from a 8-10. She says she is not taking anything for pain. No nausea or vomiting. She says she is weak. I encouraged her to drink plenty of fluids and try to eat a bland diet. She is not on any antibiotics or stool softeners.  I told her that diarrhea after a colon resection can be normal. I encouraged her to take tylenol every 6 hours for the pain.     Spoke to Blessing today to see how she is feeling. Her diarrhea has subsided. She continues to feel tired and weak. I encouraged her to keep drinking fluids and try to eat small amounts. She has no fevers or chills. Pain is controlled with tylenol. She is still recovering from surgery and needs time to heal. I encouraged her to call back if she has any issues. She expressed understanding.       [facility]      Guera Conteh RN  [facility]  General Surgery  [address] [place] [address] [address] [place] [address]  [email or web address]   Office:[phone]  Employed by [facility],

## 2022-01-12 NOTE — DISCHARGE SUMMARY
[facility] MEDICINE  DISCHARGE SUMMARY     Primary Care Physician: Myra Stein  Admission Date: 2022   Discharge Provider: Real Chawla MD Discharge Date: 2022   Diet:   Active Diet and Nourishment Order   Procedures     Diet   Regular   Code Status: Prior   Activity: DCACTIVITY: Activity as tolerated        Condition at Discharge: Good     REASON FOR PRESENTATION(See Admission Note for Details)     Small bowel obstruction    PRINCIPAL & ACTIVE DISCHARGE DIAGNOSES     Postop day #2: Laparoscopic removal of adhesions and removal of small section of small intestine.    Small bowel obstruction, persistent/recurrent.  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 Pain is minimal and she is ready to go home.     COPD  Uses oxygen intermittently at home typically just with exertion when she is going out of the house but does not always use it.  Uses Umeclidinium, Dulera, albuterol as needed  Continue home inhalers and oxygen as needed     Gastroesophageal reflux disease  Takes lansoprazole daily     Hypomagnesemia, corrected     Life stresses.  Patient's   about 2 weeks ago from head and neck cancer.  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    Code Status: DNR  Disposition: Inpatient and discharged home today      PENDING LABS     Unresulted Labs Ordered in the Past 30 Days of this Admission     No orders found from 2021 to 2022.            PROCEDURES ( this hospitalization only)      Procedure(s):  LAPAROSCOPY, LYSIS OF ADHESIONS,  LAPAROSCOPIC SMALL BOWEL  RESECTION    RECOMMENDATIONS TO OUTPATIENT PROVIDER FOR F/U VISIT     Follow-up Appointments     Follow-up and recommended labs and tests       Per surgery's recommendations               DISPOSITION     Home    SUMMARY OF HOSPITAL COURSE:      Identification/Summary:   Blessing Castellanos is a 81 year old old female recently hospitalized for diverticulitis with questionable small bowel obstruction.  During her recent hospital stay she was seen by gastroenterology and general surgery.  She was treated with antibiotics for diverticulitis and her symptoms improved.  She had a Gastrografin small bowel follow-through at that time that did not show any obstruction.  She was discharged to home on 1/4 to continue oral antibiotics at home.  She presented to the hospital again 1/7/22 with return of symptoms including abdominal pain and vomiting.  Imaging in the emergency department today showed persistent low-grade distal small bowel obstruction with focal caliber transition of the distal ileum in the right pelvis.  NG tube was placed, general surgery consulted, treated with piperacillin-tazobactam.  This is now patient's third episode of bowel obstruction in 3 months and surgery as an option was discussed with the patient and she wanted to move ahead with that..        Discharge Medications with Med changes:     Discharge Medication List as of 1/10/2022 11:04 AM      START taking these medications    Details   acetaminophen (TYLENOL) 325 MG tablet Take 2 tablets (650 mg) by mouth every 4 hours as needed for other (For optimal non-opioid multimodal pain management to improve pain control.), OTC         CONTINUE these medications which have NOT CHANGED    Details   albuterol (PROVENTIL HFA;VENTOLIN HFA) 90 mcg/actuation inhaler [ALBUTEROL (PROVENTIL HFA;VENTOLIN HFA) 90 MCG/ACTUATION INHALER] Inhale 1-2 puffs every 4 (four) hours as needed for wheezing or shortness of breath., Historical      carboxymethylcellulose PF  (REFRESH LIQUIGEL) 1 % ophthalmic gel Place 1 drop into both eyes 3 times daily as needed for dry eyes, Historical      cholecalciferol, vitamin D3, 2,000 unit Tab [CHOLECALCIFEROL, VITAMIN D3, 2,000 UNIT TAB] Take 2,000 Units by mouth once daily. , Historical      fluticasone (FLONASE) 50 MCG/ACT nasal spray Spray 1 spray into both nostrils daily, Disp-11.1 mL, R-0, E-Prescribe      gabapentin (NEURONTIN) 100 MG capsule Take 100 mg by mouth At Bedtime , Historical      lansoprazole (PREVACID) 30 MG capsule Take 30 mg by mouth every morning (before breakfast) , Historical      mometasone-formoterol (DULERA) 200-5 mcg/actuation HFAA inhaler Inhale 1 puff into the lungs 2 times daily , Historical      polyethylene glycol (MIRALAX) 17 g packet Take 17 g by mouth At Bedtime, Historical      umeclidinium (INCRUSE ELLIPTA) 62.5 MCG/INH inhaler Inhale 1 puff into the lungs daily, Historical         STOP taking these medications       ciprofloxacin (CIPRO) 500 MG tablet Comments:   Reason for Stopping:         metroNIDAZOLE (FLAGYL) 500 MG tablet Comments:   Reason for Stopping:                     Rationale for medication changes:      Acetaminophen as needed for pain    Okay by surgery to stop antibiotics        Consults       SURGERY GENERAL IP CONSULT  SOCIAL WORK IP CONSULT  PHYSICAL THERAPY ADULT IP CONSULT  OCCUPATIONAL THERAPY ADULT IP CONSULT    Immunizations given this encounter     Most Recent Immunizations   Administered Date(s) Administered     COVID-19,PF,Pfizer (12+ Yrs) 10/29/2021     FLUAD(HD)65+ QUAD 09/22/2021     Flu 65+ Years 09/09/2019     Flu, Unspecified 09/11/2014     [id number] Novel Flu 01/25/2010     Influenza (High Dose) 3 valent vaccine 08/25/2016     Influenza (IIV3) PF 09/21/2013     Influenza Vaccine IM > 6 months Valent IIV4 (Alfuria,Fluzone) 10/25/2020     Pneumo Conj 13-V (2010&after) 09/30/2015     Pneumococcal 23 valent 08/22/2013     TD (ADULT, 7+) 06/24/1994     Td (Adult), Adsorbed  12/28/2004     Tdap (Adacel,Boostrix) 09/01/2015     Zoster vaccine, live 12/15/2010           Anticoagulation Information      NA      SIGNIFICANT IMAGING FINDINGS     Results for orders placed or performed during the hospital encounter of 01/07/22   CT Abdomen Pelvis w Contrast    Impression    IMPRESSION:     1.  Findings consistent with persistent low-grade distal small bowel mechanical obstruction with focal caliber transition of the distal ileum in the right pelvis.  2.  Persistent long segment wall thickening of the sigmoid colon in an area with diverticula. The pattern is suggestive of scarring/stricture from prior diverticulitis. As previously reported, there are linear bands extending from the thickened segment   towards the transition point which could represents sinus tracts/coloenteric fistula.  3.  Amount of free fluid in the pelvis is increased.   XR Abdomen Port 1 View    Impression    IMPRESSION: NG tube tip in the distal esophagus.        SIGNIFICANT LABORATORY FINDINGS     Most Recent 3 CBC's:Recent Labs   Lab Test 01/10/22  0706 01/09/22  0610 01/08/22  0658 01/07/22  1756   WBC 10.7  --  9.9 13.2*   HGB 14.6 12.2 13.3 14.6   MCV 92  --  91 90     --  354 440     Most Recent 3 BMP's:Recent Labs   Lab Test 01/10/22  0706 01/10/22  0610 01/09/22  0610 01/08/22  0658     --  139 140   POTASSIUM 3.7  --  3.8 3.5   CHLORIDE 102  --  108* 104   CO2 28  --  26 28   BUN 12  --  9 11   CR 0.73  --  0.62 0.73   ANIONGAP 10  --  5 8   HANG 8.8  --  8.1* 8.4*   GLC 94 81 124 102           Discharge Orders        Reason for your hospital stay    Patient admitted to the hospital with recurrent small bowel obstruction. Taken to surgery on 1/8. Has done very well and is now eating well. Having bowel movements. Minimal pain in her abdomen and ready to go home and cleared by surgery to be discharged.     Follow-up and recommended labs and tests     Per surgery's recommendations     Activity    Your  activity upon discharge: activity as tolerated     Diet    Follow this diet upon discharge: Regular       Examination   Physical Exam      Wt Readings from Last 1 Encounters:   01/10/22 83.7 kg (184 lb 9.6 oz)       General Appearance: Alert and appears to be in no apparent distress  Respiratory: Lungs are clear throughout  Cardiovascular: Regular rate and rhythm without murmur  GI: Abdomen is soft and nontender  Skin: Abnormalities in skin exposed areas  Other: Neurologically she appears to be intact      Please see EMR for more detailed significant labs, imaging, consultant notes etc.    I, Real Chawla MD, personally saw the patient today and spent less than or equal to 30 minutes discharging this patient.    Real Chawla MD  [facility]    CC:Myra Stein

## 2022-01-24 ENCOUNTER — VIRTUAL VISIT (OUTPATIENT)
Dept: SURGERY | Facility: CLINIC | Age: 82
End: 2022-01-24
Payer: COMMERCIAL

## 2022-01-24 DIAGNOSIS — Z48.89 ENCOUNTER FOR POSTOPERATIVE CARE: Primary | ICD-10-CM

## 2022-01-24 PROCEDURE — 99024 POSTOP FOLLOW-UP VISIT: CPT | Performed by: PHYSICIAN ASSISTANT

## 2022-01-24 NOTE — Clinical Note
"    1/24/2022         RE: Blessing Castellanos  [address] [place] [id number]        Dear Colleague,    Thank you for referring your patient, Blessing Castellanos, to the [facility] [place]. Please see a copy of my visit note below.    The patient has been notified of following:     \"This telephone visit will be conducted via a call between you and your physician/provider. We have found that certain health care needs can be provided without the need for a physical exam.  This service lets us provide the care you need with a short phone conversation.  If a prescription is necessary we can send it directly to your pharmacy.  If lab work is needed we can place an order for that and you can then stop by our lab to have the test done at a later time.    Telephone visits are billed at different rates depending on your insurance coverage. During this emergency period, for some insurers they may be billed the same as an in-person visit.  Please reach out to your insurance provider with any questions.    If during the course of the call the physician/provider feels a telephone visit is not appropriate, you will not be charged for this service.\"    Patient has given verbal consent for Telephone visit?  Yes    What phone number would you like to be contacted at? home    How would you like to obtain your AVS? Mail a copy    HPI: 81 year old female underwent Laparoscopic lysis of adhesions on 1/5/22 with Dr Price. She had recurrent sbo and diverticulitis which resulted in adhesion of small bowel to sigmoid colon. Did well with surgery. There is a concern of possible colo enteric fistula. Ad discharge recommended that she should have colonoscopy in near future for evaluation. She has been frustrated and actually stated she is angry that no one has been able to talk to her about diarrhea she has been experiencing. She does get several bouts of diarrhea watery with " eating. Has been avoiding eating much and feeling weak. She states when she calls she is told she is due for a colonoscopy and that there are no records of her surgery. After reviewing her AVS it was clear she was calling [facility] and not our clinic. She did speak to one of our nurses On 1/11/21. She then took a couple of her 's anti-diarrhea pills and now she has not had a bm in a day and took miralax. Has an appointment with her primary on Thursday. She is worried about an UTI. She has vaginal drainage that is yellow. No dysuria. No fevers. Feeling better after being able to eat. Denies abdominal pain.         There were no vitals taken for this visit.    EXAM:  NA        Assessment/Plan: . Discussed with her that we could schedule an in person visit or telephone visit. At this time she wanted to wait and see her primary. Recommended she not take Miralax and made sure she had our phone number and my name as well as Dr Price. She states she will call if any issues. Also I did let her know she should go ahead and schedule colonoscopy in near future.     Jose Maza PA-C PA-C  [facility] Department of Surgery          Again, thank you for allowing me to participate in the care of your patient.        Sincerely,        Jose Maza PA-C

## 2022-01-24 NOTE — LETTER
"    1/24/2022        RE: Blessing Castellanos  [address] [place] [id number]        The patient has been notified of following:     \"This telephone visit will be conducted via a call between you and your physician/provider. We have found that certain health care needs can be provided without the need for a physical exam.  This service lets us provide the care you need with a short phone conversation.  If a prescription is necessary we can send it directly to your pharmacy.  If lab work is needed we can place an order for that and you can then stop by our lab to have the test done at a later time.    Telephone visits are billed at different rates depending on your insurance coverage. During this emergency period, for some insurers they may be billed the same as an in-person visit.  Please reach out to your insurance provider with any questions.    If during the course of the call the physician/provider feels a telephone visit is not appropriate, you will not be charged for this service.\"    Patient has given verbal consent for Telephone visit?  Yes    What phone number would you like to be contacted at? home    How would you like to obtain your AVS? Mail a copy    HPI: 81 year old female underwent Laparoscopic lysis of adhesions on 1/5/22 with Dr Price. She had recurrent sbo and diverticulitis which resulted in adhesion of small bowel to sigmoid colon. Did well with surgery. There is a concern of possible colo enteric fistula. Ad discharge recommended that she should have colonoscopy in near future for evaluation. She has been frustrated and actually stated she is angry that no one has been able to talk to her about diarrhea she has been experiencing. She does get several bouts of diarrhea watery with eating. Has been avoiding eating much and feeling weak. She states when she calls she is told she is due for a colonoscopy and that there are no records of her surgery. After reviewing her AVS it was " clear she was calling [facility] and not our clinic. She did speak to one of our nurses On 1/11/21. She then took a couple of her 's anti-diarrhea pills and now she has not had a bm in a day and took miralax. Has an appointment with her primary on Thursday. She is worried about an UTI. She has vaginal drainage that is yellow. No dysuria. No fevers. Feeling better after being able to eat. Denies abdominal pain.         There were no vitals taken for this visit.    EXAM:  NA        Assessment/Plan: . Discussed with her that we could schedule an in person visit or telephone visit. At this time she wanted to wait and see her primary. Recommended she not take Miralax and made sure she had our phone number and my name as well as Dr Price. She states she will call if any issues. Also I did let her know she should go ahead and schedule colonoscopy in near future.     Jose Maza PA-C PA-C  [facility] Department of Surgery            Sincerely,        Jose Maza PA-C

## 2022-01-25 NOTE — PROGRESS NOTES
"The patient has been notified of following:     \"This telephone visit will be conducted via a call between you and your physician/provider. We have found that certain health care needs can be provided without the need for a physical exam.  This service lets us provide the care you need with a short phone conversation.  If a prescription is necessary we can send it directly to your pharmacy.  If lab work is needed we can place an order for that and you can then stop by our lab to have the test done at a later time.    Telephone visits are billed at different rates depending on your insurance coverage. During this emergency period, for some insurers they may be billed the same as an in-person visit.  Please reach out to your insurance provider with any questions.    If during the course of the call the physician/provider feels a telephone visit is not appropriate, you will not be charged for this service.\"    Patient has given verbal consent for Telephone visit?  Yes    What phone number would you like to be contacted at? home    How would you like to obtain your AVS? Mail a copy    HPI: 81 year old female underwent Laparoscopic lysis of adhesions on 1/5/22 with Dr Price. She had recurrent sbo and diverticulitis which resulted in adhesion of small bowel to sigmoid colon. Did well with surgery. There is a concern of possible colo enteric fistula. Ad discharge recommended that she should have colonoscopy in near future for evaluation. She has been frustrated and actually stated she is angry that no one has been able to talk to her about diarrhea she has been experiencing. She does get several bouts of diarrhea watery with eating. Has been avoiding eating much and feeling weak. She states when she calls she is told she is due for a colonoscopy and that there are no records of her surgery. After reviewing her AVS it was clear she was calling [facility] and not our clinic. She did speak to one of our nurses On 1/11/21. She then " took a couple of her 's anti-diarrhea pills and now she has not had a bm in a day and took miralax. Has an appointment with her primary on Thursday. She is worried about an UTI. She has vaginal drainage that is yellow. No dysuria. No fevers. Feeling better after being able to eat. Denies abdominal pain.         There were no vitals taken for this visit.    EXAM:  NA        Assessment/Plan: . Discussed with her that we could schedule an in person visit or telephone visit. At this time she wanted to wait and see her primary. Recommended she not take Miralax and made sure she had our phone number and my name as well as Dr Price. She states she will call if any issues. Also I did let her know she should go ahead and schedule colonoscopy in near future.     Jose Maza PA-C PA-C  [facility] Department of Surgery

## 2022-01-26 ENCOUNTER — TELEPHONE (OUTPATIENT)
Dept: URGENT CARE | Facility: URGENT CARE | Age: 82
End: 2022-01-26
Payer: COMMERCIAL

## 2022-01-26 NOTE — TELEPHONE ENCOUNTER
I called to speak with Blessing and she said she is overall ok overall. Pt did report some difficulty with constipation over the past couple days. She will increase her fluid intake and resume use of stool softener. Encouraged pt to call back if she is not able to have a bowel movement in the next 1-2 days.      [facility]     Manju Palma  RN, BSN  [facility]  General Surgery  [address] [place] [id number]  [email or web address]   Office:[phone]  Employed by [employer],

## 2022-01-26 NOTE — TELEPHONE ENCOUNTER
----- Message from Jose Maza PA-C sent at 1/25/2022  1:59 PM CST -----  I spoke to patient and I think she is ok but I would like to have you guys call her Friday or early next week for follow up on how she is doing. Also want her to set up colonoscopy in about 6-8 weeks to follow up on her history of recurrent diverticulitis. Thank you

## 2022-08-11 ENCOUNTER — VIRTUAL VISIT (OUTPATIENT)
Dept: PHARMACY | Facility: CLINIC | Age: 82
End: 2022-08-11
Payer: COMMERCIAL

## 2022-08-11 DIAGNOSIS — G25.81 RESTLESS LEG: ICD-10-CM

## 2022-08-11 DIAGNOSIS — K21.9 GASTROESOPHAGEAL REFLUX DISEASE WITHOUT ESOPHAGITIS: ICD-10-CM

## 2022-08-11 DIAGNOSIS — J30.2 SEASONAL ALLERGIC RHINITIS, UNSPECIFIED TRIGGER: ICD-10-CM

## 2022-08-11 DIAGNOSIS — K59.00 CONSTIPATION, UNSPECIFIED CONSTIPATION TYPE: ICD-10-CM

## 2022-08-11 DIAGNOSIS — H04.123 DRY EYES: ICD-10-CM

## 2022-08-11 DIAGNOSIS — J43.9 PULMONARY EMPHYSEMA, UNSPECIFIED EMPHYSEMA TYPE (H): Primary | ICD-10-CM

## 2022-08-11 DIAGNOSIS — Z78.9 TAKES DIETARY SUPPLEMENTS: ICD-10-CM

## 2022-08-11 PROCEDURE — 99607 MTMS BY PHARM ADDL 15 MIN: CPT | Performed by: PHARMACIST

## 2022-08-11 PROCEDURE — 99605 MTMS BY PHARM NP 15 MIN: CPT | Performed by: PHARMACIST

## 2022-08-11 RX ORDER — PSEUDOEPHEDRINE HCL 30 MG
30 TABLET ORAL EVERY 4 HOURS PRN
COMMUNITY

## 2022-08-11 RX ORDER — TROLAMINE SALICYLATE 10 G/100G
CREAM TOPICAL PRN
COMMUNITY

## 2022-08-11 NOTE — LETTER
_  Medication List        Prepared on: Aug 11, 2022     Bring your Medication List when you go to the doctor, hospital, or   emergency room. And, share it with your family or caregivers.     Note any changes to how you take your medications.  Cross out medications when you no longer use them.    Medication How I take it Why I use it Prescriber   acetaminophen (TYLENOL) 325 MG tablet Take 2 tablets (650 mg) by mouth every 4 hours as needed for other (For optimal non-opioid multimodal pain management to improve pain control.) Intermittent pain Real Chawla MD   albuterol (PROVENTIL HFA;VENTOLIN HFA) 90 mcg/actuation inhaler [ALBUTEROL (PROVENTIL HFA;VENTOLIN HFA) 90 MCG/ACTUATION INHALER] Inhale 1-2 puffs every 4 (four) hours as needed for wheezing or shortness of breath. COPD Patient reported   carboxymethylcellulose PF (REFRESH PLUS) 0.5 % ophthalmic solution Place 1 drop into both eyes 3 times daily as needed for dry eyes Dry Eyes Patient reported   cholecalciferol, vitamin D3, 2,000 unit Tab [CHOLECALCIFEROL, VITAMIN D3, 2,000 UNIT TAB] Take 2,000 Units by mouth once daily.  General Health  Self   fluticasone (FLONASE) 50 MCG/ACT nasal spray Spray 1 spray into both nostrils daily Acute recurrent sinusitis, unspecified location Mauro Tang MD   gabapentin (NEURONTIN) 100 MG capsule Take 100 mg by mouth At Bedtime  Restless Leg Syndrome Patient reported   lansoprazole (PREVACID) 30 MG capsule Take 30 mg by mouth every morning (before breakfast)  GERD Patient reported   mometasone-formoterol (DULERA) 200-5 mcg/actuation HFAA inhaler Inhale 1 puff into the lungs 2 times daily  COPD Patient reported   polyethylene glycol (MIRALAX) 17 g packet Take 17 g by mouth At Bedtime Constipation Patient reported   pseudoePHEDrine (SUDAFED) 30 MG tablet Take 30 mg by mouth every 4 hours as needed for congestion Allergy Patient Reported   trolamine salicylate (ASPERCREME) 10 % external cream Apply topically as needed  for moderate pain Pain Patient Reported   umeclidinium (INCRUSE ELLIPTA) 62.5 MCG/INH inhaler Inhale 1 puff into the lungs daily COPD Patient reported         Add new medications, over-the-counter drugs, herbals, vitamins, or  minerals in the blank rows below.    Medication How I take it Why I use it Prescriber                          Allergies:      doxycycline calcium [doxycycline]; mirapex [pramipexole]; prilosec [omeprazole]; sulfa (sulfonamide antibiotics) [sulfa drugs]; zithromax [azithromycin]; antihistamines - alkylamine [alkylamines]        Side effects I have had:               Other Information:              My notes and questions:

## 2022-08-11 NOTE — LETTER
My COPD Action Plan     Name: Blessing Castellanos    YOB: 1940   Date: 8/11/2022    My doctor: Elsa Coleman, PharmD   My clinic: [address] [id number] [phone]  My Controller Medicine: Umeclidinium (Incruse Ellipta)  Formoterol/mometasone (Dulera)   Dose: Incruse Ellipta - 1 puff by mouth daily; Dulera 1 puff by mouth twice daily     My Rescue Medicine: Albuterol (Proair/Ventolin/Proventil) inhaler   Dose: 2 puffs by mouth every 4-6 hours as needed      My Flare Up Medicine: None    Dose: N/A          Use of Oxygen: Oxygen Not Prescribed      Make sure you've had your pneumonia   vaccines.          GREEN ZONE       Doing well today      Usual level of activity and exercise    Usual amount of cough and mucus    No shortness of breath    Usual level of health (thinking clearly, sleeping well, feel like eating) Actions:      Take daily medicines    Use oxygen as prescribed    Follow regular exercise and diet plan    Avoid cigarette smoke and other irritants that harm the lungs           YELLOW ZONE          Having a bad day or flare up      Short of breath more than usual    A lot more sputum (mucus) than usual    Sputum looks yellow, green, tan, brown or bloody    More coughing or wheezing    Fever or chills    Less energy; trouble completing activities    Trouble thinking or focusing    Using quick relief inhaler or nebulizer more often    Poor sleep; symptoms wake me up    Do not feel like eating Actions:      Get plenty of rest    Take daily medicines    Use quick relief inhaler every 4 hours    If you use oxygen, call you doctor to see if you should adjust your oxygen    Do breathing exercises or other things to help you relax    Let a loved one, friend or neighbor know you are feeling worse    Call your care team if you have 2 or more symptoms.  Start taking steroids or antibiotics if directed by your care team            RED ZONE       Need medical care now      Severe shortness of breath (feel you can't breathe)    Fever, chills    Not enough breath to do any activity    Trouble coughing up mucus, walking or talking    Blood in mucus    Frequent coughing   Rescue medicines are not working    Not able to sleep because of breathing    Feel confused or drowsy    Chest pain    Actions:      Call your health care team.  If you cannot reach your care team, call 911 or go to the emergency room.        Annual Reminders:  Meet with Care Team, Flu Shot every Fall  Pharmacy:    [id number] - [place], [place] - [address] AT [facility] [address]  [id number] - [place], [place] - [address] AT [facility] [address]

## 2022-08-11 NOTE — PROGRESS NOTES
Medication Therapy Management (MTM) Encounter    ASSESSMENT:                            Medication Adherence/Access: No issues identified    COPD: Stable.  Patient would benefit from a COPD Action Plan.    Allergy: Stable.    Restless leg/spasm: Needed additional education regarding risks vs benefits of acetaminophen vs ibuprofen.  Would benefit from using acetaminophen instead of NSAIDs.    GERD: Stable.    Dry Eyes:  Stable.    Constipation:  Stable.    Supplements:  Stable.     PLAN:                            1.  Recommended using acetaminophen instead of ibuprofen.  She agrees.  2.  COPD action plan completed and mailed to patient.    Follow-up: Return in about 1 year (around 8/11/2023) for Follow-up Medication Review.    SUBJECTIVE/OBJECTIVE:                          Blessing Castellanos is a 82 year old female called for a follow-up visit.  Today's visit is a follow-up MTM visit from 9/21/2021.  Patient was referred by [employer], her insurance plan.    Reason for visit: Comprehensive medication review.    Allergies/ADRs: Reviewed in chart  Past Medical History: Reviewed in chart  Tobacco: She reports that she quit smoking about 31 years ago. She has never used smokeless tobacco.  Alcohol: Less than 1 beverage / month    Medication Adherence/Access: no issues reported    COPD: Current medications: albuterol MDI as needed (uses once daily), Dulera 200/5mcg 1 puff once daily (she's aware this is prescribed for twice daily but says it's not more helpful), Incruse Ellipta 1 puff daily. Patient rinses their mouth after using steroid inhaler.    Patient is not experiencing side effects.   Patient reports the following symptoms: none.  Patient does not have an COPD Action Plan on file.     Allergy:  Patient uses Flonase daily as needed and Suafed as needed, which she reports is effective.  She denies side effects.    Restless leg/spasm:  Blessing is taking gabapentin 100 mg at bedtime to help with leg and feet  spasms which she said started after having spine surgery and nerve damage.  She does generally find it helpful, no side effects reported.  She also occasionally uses Aspercreme or ibuprofen/APAP.    Gastroesophageal reflux disease:  Blessing is taking lansoprazole 30 mg daily for acid reflux.  She says that this medication is effective at controlling her symptoms.    Dry Eyes:  Patient uses artificial tears as needed, which she reports is effective.  No side effects reported.    Constipation:  Patient uses Miralax 17g daily, which she reports is effective.  No side effects reported.    Supplements:  Blessing takes Vitamin D 2000 international unit(s) daily.  She denies side effects.  She loves milk - drinks lactose free milk and has 1-2 glasses per day, eats cheese/yogurt/cottage cheese regularly also.  Vitamin D Deficiency Screening Results:  No results found for: VITDT     Today's Vitals: There were no vitals taken for this visit.  ----------------    I spent 15 minutes with this patient today (an extra 15 minutes was spent creating the Medication Action Plan).  A copy of the visit note was provided to the patient's provider(s).    The patient was mailed a summary of these recommendations.     Elsa Coleman, [place], [facility]  Medication Therapy Management Provider  Pager: [phone]     Telemedicine Visit Details  Type of service:  Telephone visit  Start Time: 2:00 PM  End Time: 2:15 PM  Originating Location (patient location): [place]  Distant Location (provider location):  [facility]     Medication Therapy Recommendations  No medication therapy recommendations to display

## 2022-08-11 NOTE — LETTER
"Recommended To-Do List      Prepared on: Aug 11, 2022       You can get the best results from your medications by completing the items on this \"To-Do List.\"      Bring your To-Do List when you go to your doctor. And, share it with your family or caregivers.    My To-Do List:  What we talked about: What I should do:   An issue with your medication    Change the medication you are taking from ibuprofen to acetaminophen (TYLENOL)          What we talked about: What I should do:                       "

## 2022-08-11 NOTE — LETTER
August 11, 2022  Blessing Castellanos  [address] [place] [address]    Dear Ms. Castellanos, RUFINO [facility]     Thank you for talking with me on Aug 11, 2022 about your health and medications. As a follow-up to our conversation, I have included two documents:      1. Your Recommended To-Do List has steps you should take to get the best results from your medications.  2. Your Medication List will help you keep track of your medications and how to take them.    If you want to talk about these documents, please call Elsa Coleman PharmD at phone: [phone], Monday-Friday 8-4:30pm.    I look forward to working with you and your doctors to make sure your medications work well for you.    Sincerely,  Elsa Coleman PharmD  [facility] Pharmacist, [facility]

## 2022-08-11 NOTE — PATIENT INSTRUCTIONS
"Recommendations from today's MTM visit:                                                    MTM (medication therapy management) is a service provided by a clinical pharmacist designed to help you get the most of out of your medicines.   Today we reviewed what your medicines are for, how to know if they are working, that your medicines are safe and how to make your medicine regimen as easy as possible.      Try using acetaminophen instead of ibuprofen for pain - acetaminophen is a safer choice for you.  I've completed a COPD action plan and included that in this paperwork.    Follow-up: Return in about 1 year (around 8/11/2023) for Follow-up Medication Review.    It was great speaking with you today.  I value your experience and would be very thankful for your time in providing feedback in our clinic survey. In the next few days, you may receive an email or text message from [employer] with a link to a survey related to your  clinical pharmacist.\"     To schedule another MTM appointment, please call the clinic directly or you may call the MTM scheduling line at [phone] or toll-free at [phone].     My Clinical Pharmacist's contact information:                                                      Please feel free to contact me with any questions or concerns you have.      Elsa Coleman, [place], [facility]  Medication Therapy Management Provider  Pager: [phone]    "

## 2023-12-06 ENCOUNTER — TELEPHONE (OUTPATIENT)
Dept: PHARMACY | Facility: CLINIC | Age: 83
End: 2023-12-06
Payer: COMMERCIAL

## 2023-12-06 NOTE — TELEPHONE ENCOUNTER
We have been unable to reach this patient for MTM follow-up after several attempts. We will stop reaching out to the patient at this time. Please let us know if we can assist in this patient's care in the future.    Arya LebronD, [facility]  Medication Therapy Management Provider  [phone]

## 2024-04-21 ENCOUNTER — LAB REQUISITION (OUTPATIENT)
Dept: LAB | Facility: CLINIC | Age: 84
End: 2024-04-21
Payer: COMMERCIAL

## 2024-04-21 DIAGNOSIS — Z48.815 ENCOUNTER FOR SURGICAL AFTERCARE FOLLOWING SURGERY ON THE DIGESTIVE SYSTEM: ICD-10-CM

## 2024-04-23 LAB
ANION GAP SERPL CALCULATED.3IONS-SCNC: 11 MMOL/L (ref 7–15)
BUN SERPL-MCNC: 9.5 MG/DL (ref 8–23)
CALCIUM SERPL-MCNC: 8.6 MG/DL (ref 8.8–10.2)
CHLORIDE SERPL-SCNC: 99 MMOL/L (ref 98–107)
CREAT SERPL-MCNC: 0.53 MG/DL (ref 0.51–0.95)
DEPRECATED HCO3 PLAS-SCNC: 27 MMOL/L (ref 22–29)
EGFRCR SERPLBLD CKD-EPI 2021: >90 ML/MIN/1.73M2
ERYTHROCYTE [DISTWIDTH] IN BLOOD BY AUTOMATED COUNT: 14.2 % (ref 10–15)
GLUCOSE SERPL-MCNC: 98 MG/DL (ref 70–99)
HCT VFR BLD AUTO: 39.4 % (ref 35–47)
HGB BLD-MCNC: 12.2 G/DL (ref 11.7–15.7)
MCH RBC QN AUTO: 29.2 PG (ref 26.5–33)
MCHC RBC AUTO-ENTMCNC: 31 G/DL (ref 31.5–36.5)
MCV RBC AUTO: 94 FL (ref 78–100)
PLATELET # BLD AUTO: 631 10E3/UL (ref 150–450)
POTASSIUM SERPL-SCNC: 3.5 MMOL/L (ref 3.4–5.3)
RBC # BLD AUTO: 4.18 10E6/UL (ref 3.8–5.2)
SODIUM SERPL-SCNC: 137 MMOL/L (ref 135–145)
WBC # BLD AUTO: 10.4 10E3/UL (ref 4–11)

## 2024-04-23 PROCEDURE — 80048 BASIC METABOLIC PNL TOTAL CA: CPT | Performed by: INTERNAL MEDICINE

## 2024-04-23 PROCEDURE — 36415 COLL VENOUS BLD VENIPUNCTURE: CPT | Performed by: INTERNAL MEDICINE

## 2024-04-23 PROCEDURE — 85027 COMPLETE CBC AUTOMATED: CPT | Performed by: INTERNAL MEDICINE

## 2024-04-23 PROCEDURE — P9603 ONE-WAY ALLOW PRORATED MILES: HCPCS | Performed by: INTERNAL MEDICINE

## 2024-05-08 ENCOUNTER — LAB REQUISITION (OUTPATIENT)
Dept: LAB | Facility: CLINIC | Age: 84
End: 2024-05-08
Payer: COMMERCIAL

## 2024-05-08 DIAGNOSIS — T81.49XD INFECTION FOLLOWING A PROCEDURE, OTHER SURGICAL SITE, SUBSEQUENT ENCOUNTER: ICD-10-CM

## 2024-05-08 DIAGNOSIS — Z48.815 ENCOUNTER FOR SURGICAL AFTERCARE FOLLOWING SURGERY ON THE DIGESTIVE SYSTEM: ICD-10-CM

## 2024-05-08 DIAGNOSIS — K56.609 UNSPECIFIED INTESTINAL OBSTRUCTION, UNSPECIFIED AS TO PARTIAL VERSUS COMPLETE OBSTRUCTION (H): ICD-10-CM

## 2024-05-09 LAB
ERYTHROCYTE [DISTWIDTH] IN BLOOD BY AUTOMATED COUNT: 13.7 % (ref 10–15)
HCT VFR BLD AUTO: 41.8 % (ref 35–47)
HGB BLD-MCNC: 12.9 G/DL (ref 11.7–15.7)
MCH RBC QN AUTO: 28.9 PG (ref 26.5–33)
MCHC RBC AUTO-ENTMCNC: 30.9 G/DL (ref 31.5–36.5)
MCV RBC AUTO: 94 FL (ref 78–100)
PLATELET # BLD AUTO: 503 10E3/UL (ref 150–450)
RBC # BLD AUTO: 4.47 10E6/UL (ref 3.8–5.2)
WBC # BLD AUTO: 11 10E3/UL (ref 4–11)

## 2024-05-09 PROCEDURE — P9603 ONE-WAY ALLOW PRORATED MILES: HCPCS | Performed by: INTERNAL MEDICINE

## 2024-05-09 PROCEDURE — 36415 COLL VENOUS BLD VENIPUNCTURE: CPT | Performed by: INTERNAL MEDICINE

## 2024-05-09 PROCEDURE — 80048 BASIC METABOLIC PNL TOTAL CA: CPT | Performed by: INTERNAL MEDICINE

## 2024-05-09 PROCEDURE — 85027 COMPLETE CBC AUTOMATED: CPT | Performed by: INTERNAL MEDICINE

## 2024-05-10 LAB
ANION GAP SERPL CALCULATED.3IONS-SCNC: 13 MMOL/L (ref 7–15)
BUN SERPL-MCNC: 13.6 MG/DL (ref 8–23)
CALCIUM SERPL-MCNC: 10.5 MG/DL (ref 8.8–10.2)
CHLORIDE SERPL-SCNC: 98 MMOL/L (ref 98–107)
CREAT SERPL-MCNC: 0.62 MG/DL (ref 0.51–0.95)
DEPRECATED HCO3 PLAS-SCNC: 24 MMOL/L (ref 22–29)
EGFRCR SERPLBLD CKD-EPI 2021: 87 ML/MIN/1.73M2
GLUCOSE SERPL-MCNC: 87 MG/DL (ref 70–99)
POTASSIUM SERPL-SCNC: 4.7 MMOL/L (ref 3.4–5.3)
SODIUM SERPL-SCNC: 135 MMOL/L (ref 135–145)

## 2024-12-06 NOTE — PLAN OF CARE
Patient alert and oriented. Patient vitals stable. Denies pain. NG in place 600 mL out. Patient sent to surgery report given to PACU.    I have a steady place to live

## (undated) DEVICE — CUSTOM PACK LAP CHOLE SBA5BLCHEA

## (undated) DEVICE — SOL RINGERS LACTATED 1000ML BAG 2B2324X

## (undated) DEVICE — SUTURE VICRYL+ 4-0 UNDYED PS-2 VCP496H

## (undated) DEVICE — WECK EFX CONES AND SUTURE PASSER EFXCT1

## (undated) DEVICE — SUCTION MANIFOLD NEPTUNE 2 SYS 1 PORT 702-025-000

## (undated) DEVICE — PREP DURAPREP 26ML APL 8630

## (undated) DEVICE — TUBING SUCTION MEDI-VAC 1/4"X20' N620A - HE

## (undated) DEVICE — Device

## (undated) DEVICE — STPL ENDO ARTICULATING 60MM EC60A

## (undated) DEVICE — ENDO POUCH UNIV RETRIEVAL SYSTEM INZII 10MM CD001

## (undated) DEVICE — TUBING SMOKE EVAC PNEUMOCLEAR HIGH FLOW 0620050250

## (undated) DEVICE — SUTURE VICRYL+ 2-0 27IN SH UND VCP417H

## (undated) DEVICE — GLOVE BIOGEL PI SZ 8.0 40880

## (undated) DEVICE — SU VICRYL+ 0 27 UR6 VLT VCP603H

## (undated) DEVICE — ENDO TROCAR SLEEVE KII Z-THREADED 05X100MM CTS02

## (undated) DEVICE — GOWN IMPERVIOUS BREATHABLE SMART XLG 89045

## (undated) DEVICE — TUBING LAP SUCT/IRRIG STRYKER 250070500

## (undated) DEVICE — DRAIN RESERVOIR 100ML JP 0070740

## (undated) DEVICE — DECANTER VIAL 2006S

## (undated) DEVICE — ENDO TROCAR FIRST ENTRY KII FIOS Z-THRD 11X100MM CTF33

## (undated) DEVICE — ENDO TROCAR SHIELDED BLADED KII Z-THRD 12X100MM CTB73

## (undated) DEVICE — SOL WATER IRRIG 1000ML BOTTLE 2F7114

## (undated) DEVICE — ENDO TROCAR FIRST ENTRY KII FIOS Z-THRD 05X100MM CTF03

## (undated) DEVICE — SU SILK 2-0 SH 30" K833H

## (undated) DEVICE — PLATE GROUNDING ADULT W/CORD 9165L

## (undated) DEVICE — DRAIN RND 1/4 19FR SIL 0070230

## (undated) DEVICE — ENDO SHEARS RENEW LAP ENDOCUT SCISSOR TIP 16.5MM 3142